# Patient Record
Sex: FEMALE | Race: BLACK OR AFRICAN AMERICAN | Employment: FULL TIME | URBAN - METROPOLITAN AREA
[De-identification: names, ages, dates, MRNs, and addresses within clinical notes are randomized per-mention and may not be internally consistent; named-entity substitution may affect disease eponyms.]

---

## 2017-03-03 ENCOUNTER — OFFICE VISIT (OUTPATIENT)
Dept: FAMILY MEDICINE CLINIC | Facility: CLINIC | Age: 32
End: 2017-03-03

## 2017-03-03 VITALS
HEIGHT: 68.5 IN | BODY MASS INDEX: 24.99 KG/M2 | HEART RATE: 86 BPM | WEIGHT: 166.81 LBS | SYSTOLIC BLOOD PRESSURE: 127 MMHG | DIASTOLIC BLOOD PRESSURE: 87 MMHG

## 2017-03-03 DIAGNOSIS — Z30.430 ENCOUNTER FOR INSERTION OF INTRAUTERINE CONTRACEPTIVE DEVICE: Primary | ICD-10-CM

## 2017-03-03 DIAGNOSIS — Z12.4 PAP SMEAR FOR CERVICAL CANCER SCREENING: ICD-10-CM

## 2017-03-03 PROCEDURE — 58300 INSERT INTRAUTERINE DEVICE: CPT | Performed by: FAMILY MEDICINE

## 2017-03-03 PROCEDURE — 99385 PREV VISIT NEW AGE 18-39: CPT | Performed by: FAMILY MEDICINE

## 2017-03-03 NOTE — PROGRESS NOTES
INTRAUTERINE DEVICE INSERTION   Richard Mohr is a 28year old female Here for _ IUD insertion   G_.   Regular partner x _ years   Wants long-term contraception, current contraception _   Last intercourse _              LMP _   _ - pap nl; GC/Chlamydia - Ne MD if f/c/foul sm elling vaginal discharge/ intolerable and worsening abdominal cramping/ vaginal bleeding/ lightheadeness or SOB. - String check in 4-12 weeks  Pt stated understanding; no learning barriers.     Grzegorz Springer MD

## 2017-03-05 LAB
C TRACH DNA SPEC QL NAA+PROBE: NEGATIVE
N GONORRHOEA DNA SPEC QL NAA+PROBE: NEGATIVE

## 2017-03-08 LAB — HPV I/H RISK 1 DNA SPEC QL NAA+PROBE: NEGATIVE

## 2017-03-09 ENCOUNTER — TELEPHONE (OUTPATIENT)
Dept: FAMILY MEDICINE CLINIC | Facility: CLINIC | Age: 32
End: 2017-03-09

## 2017-03-09 ENCOUNTER — OFFICE VISIT (OUTPATIENT)
Dept: FAMILY MEDICINE CLINIC | Facility: CLINIC | Age: 32
End: 2017-03-09

## 2017-03-09 VITALS — OXYGEN SATURATION: 98 % | HEART RATE: 94 BPM

## 2017-03-09 DIAGNOSIS — N30.01 ACUTE CYSTITIS WITH HEMATURIA: Primary | ICD-10-CM

## 2017-03-09 LAB
BILIRUBIN: NEGATIVE
GLUCOSE (URINE DIPSTICK): NEGATIVE MG/DL
KETONES (URINE DIPSTICK): NEGATIVE MG/DL
MULTISTIX LOT#: ABNORMAL NUMERIC
NITRITE, URINE: NEGATIVE
PH, URINE: 6 (ref 4.5–8)
PROTEIN (URINE DIPSTICK): 30 MG/DL
SPECIFIC GRAVITY: 1.02 (ref 1–1.03)
UROBILINOGEN,SEMI-QN: 0.2 MG/DL (ref 0–1.9)

## 2017-03-09 PROCEDURE — 87086 URINE CULTURE/COLONY COUNT: CPT | Performed by: NURSE PRACTITIONER

## 2017-03-09 PROCEDURE — 99212 OFFICE O/P EST SF 10 MIN: CPT | Performed by: NURSE PRACTITIONER

## 2017-03-09 PROCEDURE — 87186 SC STD MICRODIL/AGAR DIL: CPT | Performed by: NURSE PRACTITIONER

## 2017-03-09 PROCEDURE — 81002 URINALYSIS NONAUTO W/O SCOPE: CPT | Performed by: NURSE PRACTITIONER

## 2017-03-09 PROCEDURE — 87077 CULTURE AEROBIC IDENTIFY: CPT | Performed by: NURSE PRACTITIONER

## 2017-03-09 RX ORDER — NITROFURANTOIN 25; 75 MG/1; MG/1
100 CAPSULE ORAL 2 TIMES DAILY
Qty: 14 CAPSULE | Refills: 0 | Status: SHIPPED | OUTPATIENT
Start: 2017-03-09 | End: 2017-03-15

## 2017-03-09 NOTE — TELEPHONE ENCOUNTER
Dr. Tejas Luther can add her on at 245 if she can make it. Thanks.   I already asked Dr. Tejas Luther

## 2017-03-09 NOTE — TELEPHONE ENCOUNTER
Spoke to patient; she is not able to come in today; patient currently at work. Patient advised [facility] to evaluate. She verbalized understanding and will go to [facility]. Rn to f/u tomorrow.

## 2017-03-09 NOTE — TELEPHONE ENCOUNTER
Dr. Naylor Presume for Dr. Justa Naqvi, please see message below. Thank you. Reason for Call/Chief Complaint: Urgency, burning, urine retention. UTI Symptoms.   Birtha Kehr placed 3/3/17  Onset: Sunday  Nursing Assessment/Associated Symptoms: Pt states had Birtha Kehr place

## 2017-03-10 NOTE — TELEPHONE ENCOUNTER
Pt states she went to [facility] and was given prescription for UTI. Pt states she hasn't started med yet, will start today. Urine culture sent. Appt with Dr. Richie Leahy scheduled 3/11/17 canceled. Pt does have appt with Dr. Luke Easley 3/15/17.

## 2017-03-10 NOTE — PROGRESS NOTES
CHIEF COMPLAINT:   Patient presents with:  UTI      HPI:   Alyssa Ruiz is a 28year old female who presents with symptoms of UTI. IUD placed about 6 days ago, following day with urinary pressure, frequency, dysuria.  Did a at home urine test- positive f PLAN: Meds as listed below.   Comfort measures as described in Patient Instructions    Meds & Refills for this Visit:    Signed Prescriptions Disp Refills    Nitrofurantoin Monohyd Macro (MACROBID) 100 MG Oral Cap 14 capsule 0      Sig: Take 1 capsule (100 © [id number] [address], [address] [place]. All rights reserved. This information is not intended as a substitute for professional medical care. Always follow your healthcare professional's instructions.

## 2017-03-15 ENCOUNTER — OFFICE VISIT (OUTPATIENT)
Dept: FAMILY MEDICINE CLINIC | Facility: CLINIC | Age: 32
End: 2017-03-15

## 2017-03-15 ENCOUNTER — HOSPITAL ENCOUNTER (OUTPATIENT)
Dept: CT IMAGING | Facility: HOSPITAL | Age: 32
Discharge: HOME | End: 2017-03-15
Attending: FAMILY MEDICINE
Payer: MEDICAID

## 2017-03-15 ENCOUNTER — PRIOR ORIGINAL RECORDS (OUTPATIENT)
Dept: OTHER | Age: 32
End: 2017-03-15

## 2017-03-15 ENCOUNTER — LAB ENCOUNTER (OUTPATIENT)
Dept: LAB | Age: 32
End: 2017-03-15
Attending: FAMILY MEDICINE
Payer: MEDICAID

## 2017-03-15 VITALS
RESPIRATION RATE: 16 BRPM | SYSTOLIC BLOOD PRESSURE: 124 MMHG | WEIGHT: 167 LBS | HEART RATE: 84 BPM | TEMPERATURE: 98 F | BODY MASS INDEX: 25 KG/M2 | DIASTOLIC BLOOD PRESSURE: 76 MMHG

## 2017-03-15 DIAGNOSIS — R00.2 PALPITATIONS: Primary | ICD-10-CM

## 2017-03-15 DIAGNOSIS — R00.2 PALPITATIONS: ICD-10-CM

## 2017-03-15 DIAGNOSIS — R07.89 CHEST PRESSURE: ICD-10-CM

## 2017-03-15 LAB
ALBUMIN SERPL BCP-MCNC: 3.8 G/DL (ref 3.5–4.8)
ALBUMIN/GLOB SERPL: 1.2 {RATIO} (ref 1–2)
ALP SERPL-CCNC: 50 U/L (ref 32–100)
ALT SERPL-CCNC: 24 U/L (ref 14–54)
ANION GAP SERPL CALC-SCNC: 4 MMOL/L (ref 0–18)
AST SERPL-CCNC: 33 U/L (ref 15–41)
BASOPHILS # BLD: 0 K/UL (ref 0–0.2)
BASOPHILS NFR BLD: 0 %
BILIRUB SERPL-MCNC: 0.3 MG/DL (ref 0.3–1.2)
BUN SERPL-MCNC: 9 MG/DL (ref 8–20)
BUN/CREAT SERPL: 12.5 (ref 10–20)
CALCIUM SERPL-MCNC: 9 MG/DL (ref 8.5–10.5)
CHLORIDE SERPL-SCNC: 109 MMOL/L (ref 95–110)
CO2 SERPL-SCNC: 26 MMOL/L (ref 22–32)
CREAT BLD-MCNC: 0.7 MG/DL (ref 0.5–1.5)
CREAT SERPL-MCNC: 0.72 MG/DL (ref 0.5–1.5)
EOSINOPHIL # BLD: 0.1 K/UL (ref 0–0.7)
EOSINOPHIL NFR BLD: 2 %
ERYTHROCYTE [DISTWIDTH] IN BLOOD BY AUTOMATED COUNT: 20.5 % (ref 11–15)
GLOBULIN PLAS-MCNC: 3.3 G/DL (ref 2.5–3.7)
GLUCOSE SERPL-MCNC: 67 MG/DL (ref 70–99)
HCT VFR BLD AUTO: 27.3 % (ref 35–48)
HGB BLD-MCNC: 8.2 G/DL (ref 12–16)
LYMPHOCYTES # BLD: 2.7 K/UL (ref 1–4)
LYMPHOCYTES NFR BLD: 34 %
MCH RBC QN AUTO: 16.8 PG (ref 27–32)
MCHC RBC AUTO-ENTMCNC: 29.9 G/DL (ref 32–37)
MCV RBC AUTO: 56 FL (ref 80–100)
MONOCYTES # BLD: 0.6 K/UL (ref 0–1)
MONOCYTES NFR BLD: 8 %
NEUTROPHILS # BLD AUTO: 4.5 K/UL (ref 1.8–7.7)
NEUTROPHILS NFR BLD: 57 %
OSMOLALITY UR CALC.SUM OF ELEC: 285 MOSM/KG (ref 275–295)
PLATELET # BLD AUTO: 355 K/UL (ref 140–400)
PMV BLD AUTO: 8.9 FL (ref 7.4–10.3)
POTASSIUM SERPL-SCNC: 3.4 MMOL/L (ref 3.3–5.1)
PROT SERPL-MCNC: 7.1 G/DL (ref 5.9–8.4)
RBC # BLD AUTO: 4.88 M/UL (ref 3.7–5.4)
SODIUM SERPL-SCNC: 139 MMOL/L (ref 136–144)
TSH SERPL-ACNC: 0.47 UIU/ML (ref 0.34–5.6)
WBC # BLD AUTO: 7.9 K/UL (ref 4–11)

## 2017-03-15 PROCEDURE — 99214 OFFICE O/P EST MOD 30 MIN: CPT | Performed by: FAMILY MEDICINE

## 2017-03-15 PROCEDURE — 82565 ASSAY OF CREATININE: CPT

## 2017-03-15 PROCEDURE — 93000 ELECTROCARDIOGRAM COMPLETE: CPT | Performed by: FAMILY MEDICINE

## 2017-03-15 PROCEDURE — 80053 COMPREHEN METABOLIC PANEL: CPT

## 2017-03-15 PROCEDURE — 84443 ASSAY THYROID STIM HORMONE: CPT

## 2017-03-15 PROCEDURE — 85025 COMPLETE CBC W/AUTO DIFF WBC: CPT

## 2017-03-15 PROCEDURE — 36415 COLL VENOUS BLD VENIPUNCTURE: CPT

## 2017-03-15 PROCEDURE — 93005 ELECTROCARDIOGRAM TRACING: CPT | Performed by: FAMILY MEDICINE

## 2017-03-15 PROCEDURE — 99212 OFFICE O/P EST SF 10 MIN: CPT | Performed by: FAMILY MEDICINE

## 2017-03-15 PROCEDURE — 71260 CT THORAX DX C+: CPT

## 2017-03-20 ENCOUNTER — TELEPHONE (OUTPATIENT)
Dept: FAMILY MEDICINE CLINIC | Facility: CLINIC | Age: 32
End: 2017-03-20

## 2017-03-20 DIAGNOSIS — D64.9 LOW HEMOGLOBIN: Primary | ICD-10-CM

## 2017-03-20 NOTE — PROGRESS NOTES
HPI:    Isabel Burleson is a 28year old female presents to clinic with concerns of palpitations. Patient has been experiencing these symptoms for over a year, maybe closer to 2 years.  States that symptoms occur almost every day, usually while at rest, not PHYSICAL EXAM:      03/15/17  1335   BP: 124/76   Pulse: 84   Temp: 98.4 °F (36.9 °C)   TempSrc: Oral   Resp: 16   Weight: 167 lb (75.751 kg)      Physical Exam   Constitutional: She is well-developed, well-nourished, and in no distress.    HENT:   Head:

## 2017-03-27 ENCOUNTER — OFFICE VISIT (OUTPATIENT)
Dept: HEMATOLOGY/ONCOLOGY | Facility: HOSPITAL | Age: 32
End: 2017-03-27
Attending: INTERNAL MEDICINE
Payer: MEDICAID

## 2017-03-27 VITALS
TEMPERATURE: 98 F | DIASTOLIC BLOOD PRESSURE: 61 MMHG | RESPIRATION RATE: 16 BRPM | BODY MASS INDEX: 24.87 KG/M2 | SYSTOLIC BLOOD PRESSURE: 125 MMHG | WEIGHT: 166 LBS | HEART RATE: 85 BPM | HEIGHT: 68.5 IN

## 2017-03-27 DIAGNOSIS — D50.0 IRON DEFICIENCY ANEMIA DUE TO CHRONIC BLOOD LOSS: Primary | ICD-10-CM

## 2017-03-27 DIAGNOSIS — Z86.711 HISTORY OF PULMONARY EMBOLISM: ICD-10-CM

## 2017-03-27 DIAGNOSIS — N92.0 MENORRHAGIA WITH REGULAR CYCLE: ICD-10-CM

## 2017-03-27 PROCEDURE — 99245 OFF/OP CONSLTJ NEW/EST HI 55: CPT | Performed by: INTERNAL MEDICINE

## 2017-03-27 RX ORDER — ALBUTEROL SULFATE 90 UG/1
1 AEROSOL, METERED RESPIRATORY (INHALATION) EVERY 6 HOURS PRN
COMMUNITY
End: 2018-04-23

## 2017-03-27 NOTE — CONSULTS
[facility]    PATIENT'S NAME: Sedrick Mccoy   CONSULTING PHYSICIAN: Orlin Andrea.  Lore Doll MD   PATIENT ACCOUNT #: [de-identified] LOCATION: [address] [place] [address] RECORD #: [id number] YOB: 1985   CONSULTATION DATE: 03/27/2017       CANCER [facility] contraceptive in June 2016, menorrhagia, severe iron deficiency anemia. MEDICATION:  Ferrous sulfate. ALLERGIES:  No known drug allergies. SOCIAL HISTORY:  She is a never smoker. She does not drink alcohol.   She works as a .    FAMILY regard to her previous pulmonary embolism, we suspect this was provoked by an implantable hormonal contraceptive which has been removed. She is doing well off anticoagulation at this time.   We will screen for antiphospholipid syndrome, factor V prothrombi

## 2017-03-29 ENCOUNTER — TELEPHONE (OUTPATIENT)
Dept: FAMILY MEDICINE CLINIC | Facility: CLINIC | Age: 32
End: 2017-03-29

## 2017-03-29 NOTE — TELEPHONE ENCOUNTER
----- Message from Ashley Powell MD sent at 3/14/2017  9:31 AM CDT -----  Please inform patient of normal pap with neg HPV

## 2017-04-06 ENCOUNTER — OFFICE VISIT (OUTPATIENT)
Dept: HEMATOLOGY/ONCOLOGY | Facility: HOSPITAL | Age: 32
End: 2017-04-06
Attending: INTERNAL MEDICINE
Payer: MEDICAID

## 2017-04-06 ENCOUNTER — TELEPHONE (OUTPATIENT)
Dept: FAMILY MEDICINE CLINIC | Facility: CLINIC | Age: 32
End: 2017-04-06

## 2017-04-06 ENCOUNTER — PRIOR ORIGINAL RECORDS (OUTPATIENT)
Dept: OTHER | Age: 32
End: 2017-04-06

## 2017-04-06 ENCOUNTER — LAB ENCOUNTER (OUTPATIENT)
Dept: LAB | Facility: HOSPITAL | Age: 32
End: 2017-04-06
Attending: INTERNAL MEDICINE
Payer: MEDICAID

## 2017-04-06 VITALS
DIASTOLIC BLOOD PRESSURE: 63 MMHG | TEMPERATURE: 98 F | HEART RATE: 76 BPM | RESPIRATION RATE: 16 BRPM | SYSTOLIC BLOOD PRESSURE: 108 MMHG

## 2017-04-06 DIAGNOSIS — Z86.711 HISTORY OF PULMONARY EMBOLISM: ICD-10-CM

## 2017-04-06 DIAGNOSIS — D50.0 IRON DEFICIENCY ANEMIA DUE TO CHRONIC BLOOD LOSS: Primary | ICD-10-CM

## 2017-04-06 DIAGNOSIS — D50.0 IRON DEFICIENCY ANEMIA DUE TO CHRONIC BLOOD LOSS: ICD-10-CM

## 2017-04-06 DIAGNOSIS — R00.2 PALPITATIONS: Primary | ICD-10-CM

## 2017-04-06 DIAGNOSIS — N92.0 MENORRHAGIA WITH REGULAR CYCLE: ICD-10-CM

## 2017-04-06 LAB
BUN: 9 MG/DL
CREATININE, SERUM: 0.72 MG/DL
GLUCOSE: 67 MG/DL
POTASSIUM, SERUM: 3.4 MEQ/L
SGOT (AST): 33 IU/L
SGPT (ALT): 24 IU/L
SODIUM: 139 MEQ/L

## 2017-04-06 PROCEDURE — 86146 BETA-2 GLYCOPROTEIN ANTIBODY: CPT

## 2017-04-06 PROCEDURE — 96375 TX/PRO/DX INJ NEW DRUG ADDON: CPT

## 2017-04-06 PROCEDURE — 84466 ASSAY OF TRANSFERRIN: CPT

## 2017-04-06 PROCEDURE — 96365 THER/PROPH/DIAG IV INF INIT: CPT

## 2017-04-06 PROCEDURE — 96366 THER/PROPH/DIAG IV INF ADDON: CPT

## 2017-04-06 PROCEDURE — 81240 F2 GENE: CPT

## 2017-04-06 PROCEDURE — 83540 ASSAY OF IRON: CPT

## 2017-04-06 PROCEDURE — 86147 CARDIOLIPIN ANTIBODY EA IG: CPT

## 2017-04-06 PROCEDURE — 36415 COLL VENOUS BLD VENIPUNCTURE: CPT

## 2017-04-06 PROCEDURE — 85300 ANTITHROMBIN III ACTIVITY: CPT

## 2017-04-06 PROCEDURE — 85025 COMPLETE CBC W/AUTO DIFF WBC: CPT

## 2017-04-06 PROCEDURE — 81241 F5 GENE: CPT

## 2017-04-06 RX ORDER — DIPHENHYDRAMINE HCL 25 MG
CAPSULE ORAL
Status: COMPLETED
Start: 2017-04-06 | End: 2017-04-06

## 2017-04-06 RX ORDER — ACETAMINOPHEN 325 MG/1
TABLET ORAL
Status: COMPLETED
Start: 2017-04-06 | End: 2017-04-06

## 2017-04-06 RX ORDER — SODIUM CHLORIDE 9 MG/ML
INJECTION, SOLUTION INTRAVENOUS
Status: DISCONTINUED
Start: 2017-04-06 | End: 2017-04-06

## 2017-04-06 RX ORDER — 0.9 % SODIUM CHLORIDE 0.9 %
VIAL (ML) INJECTION
Status: DISCONTINUED
Start: 2017-04-06 | End: 2017-04-06

## 2017-04-06 RX ORDER — DIPHENHYDRAMINE HCL 25 MG
25 CAPSULE ORAL ONCE
Status: COMPLETED | OUTPATIENT
Start: 2017-04-06 | End: 2017-04-06

## 2017-04-06 RX ORDER — ACETAMINOPHEN 325 MG/1
650 TABLET ORAL ONCE
Status: COMPLETED | OUTPATIENT
Start: 2017-04-06 | End: 2017-04-06

## 2017-04-06 RX ADMIN — ACETAMINOPHEN 650 MG: 325 TABLET ORAL at 10:33:00

## 2017-04-06 RX ADMIN — DIPHENHYDRAMINE HCL 25 MG: 25 MG CAPSULE ORAL at 10:34:00

## 2017-04-06 NOTE — PROGRESS NOTES
Pt arrived to infusion for first dose of Iron Dextran. Labs from 3/25 HGB 8.2.  Pt recently diagnosed with PE, pt was having shortness of breath and palpitations, Doctors originally thought it was the PE, once that was resolved she was still experiencing th

## 2017-04-06 NOTE — TELEPHONE ENCOUNTER
Pt is currently at the cardiology office to see Dr. Janett Lala. They need this pt's referral and results of her 03/31 ekg faxed to them at [phone].  Thank you

## 2017-04-20 ENCOUNTER — TELEPHONE (OUTPATIENT)
Dept: FAMILY MEDICINE CLINIC | Facility: CLINIC | Age: 32
End: 2017-04-20

## 2017-04-21 ENCOUNTER — TELEPHONE (OUTPATIENT)
Dept: FAMILY MEDICINE CLINIC | Facility: CLINIC | Age: 32
End: 2017-04-21

## 2017-05-02 ENCOUNTER — APPOINTMENT (OUTPATIENT)
Dept: HEMATOLOGY/ONCOLOGY | Facility: HOSPITAL | Age: 32
End: 2017-05-02
Attending: INTERNAL MEDICINE
Payer: MEDICAID

## 2017-05-03 ENCOUNTER — OFFICE VISIT (OUTPATIENT)
Dept: FAMILY MEDICINE CLINIC | Facility: CLINIC | Age: 32
End: 2017-05-03

## 2017-05-03 VITALS
SYSTOLIC BLOOD PRESSURE: 122 MMHG | BODY MASS INDEX: 25 KG/M2 | DIASTOLIC BLOOD PRESSURE: 82 MMHG | RESPIRATION RATE: 16 BRPM | HEART RATE: 72 BPM | WEIGHT: 167 LBS | TEMPERATURE: 99 F

## 2017-05-03 DIAGNOSIS — Z30.432 ENCOUNTER FOR REMOVAL OF INTRAUTERINE CONTRACEPTIVE DEVICE: Primary | ICD-10-CM

## 2017-05-03 PROCEDURE — 58301 REMOVE INTRAUTERINE DEVICE: CPT | Performed by: FAMILY MEDICINE

## 2017-05-04 NOTE — PROGRESS NOTES
HPI: Gemini Coyne is a 28year old female who presents for IUD removal.  Placed by Dr. Julianna Cao in March 2017. Has been spotting since insertion. She has had cramps as well so she would like it removed. She cannot use other birth control.   Will just use condom

## 2017-05-10 NOTE — TELEPHONE ENCOUNTER
Tasked to Cardiology. Please place orders for these tests with the diagnosis and contact patient. Thank you.

## 2017-05-10 NOTE — TELEPHONE ENCOUNTER
Pt seen by MDB at [facility]. Not at [facility] cardiology. Please contact [phone].

## 2017-05-15 ENCOUNTER — HOSPITAL ENCOUNTER (OUTPATIENT)
Dept: CV DIAGNOSTICS | Facility: HOSPITAL | Age: 32
Discharge: HOME | End: 2017-05-15
Attending: INTERNAL MEDICINE
Payer: MEDICAID

## 2017-05-15 ENCOUNTER — OFFICE VISIT (OUTPATIENT)
Dept: HEMATOLOGY/ONCOLOGY | Facility: HOSPITAL | Age: 32
End: 2017-05-15
Attending: INTERNAL MEDICINE
Payer: MEDICAID

## 2017-05-15 VITALS
WEIGHT: 166 LBS | HEART RATE: 82 BPM | BODY MASS INDEX: 24.87 KG/M2 | DIASTOLIC BLOOD PRESSURE: 79 MMHG | RESPIRATION RATE: 16 BRPM | TEMPERATURE: 99 F | HEIGHT: 68.5 IN | SYSTOLIC BLOOD PRESSURE: 109 MMHG

## 2017-05-15 DIAGNOSIS — N92.0 MENORRHAGIA WITH REGULAR CYCLE: ICD-10-CM

## 2017-05-15 DIAGNOSIS — D50.0 IRON DEFICIENCY ANEMIA DUE TO CHRONIC BLOOD LOSS: Primary | ICD-10-CM

## 2017-05-15 DIAGNOSIS — Z86.711 HISTORY OF PULMONARY EMBOLISM: ICD-10-CM

## 2017-05-15 DIAGNOSIS — I49.3 VENTRICULAR PREMATURE BEATS: ICD-10-CM

## 2017-05-15 DIAGNOSIS — R07.2 CHEST PAIN, PRECORDIAL: ICD-10-CM

## 2017-05-15 DIAGNOSIS — R06.00 DYSPNEA: ICD-10-CM

## 2017-05-15 PROCEDURE — 93306 TTE W/DOPPLER COMPLETE: CPT | Performed by: INTERNAL MEDICINE

## 2017-05-15 PROCEDURE — 99214 OFFICE O/P EST MOD 30 MIN: CPT | Performed by: INTERNAL MEDICINE

## 2017-05-15 NOTE — PROGRESS NOTES
Cancer Center Progress Note    Patient Name: Ranjit Victor   YOB: 1985   Medical Record Number: [id number]   Attending Physician: Arron Hines M.D.        Chief Complaint:  Iron deficiency anemia, menorrhagia, history of provoked pulmonary embo of Onset   • Diabetes Father    • Heart Disorder Father    • Hypertension Father    • Diabetes Mother    • Heart Disorder Mother        Social History:    Social History   Marital Status: Single  Spouse Name: N/A    Years of Education: N/A  Number of Child Results  Component Value Date   GLU 67* 03/15/2017   BUN 9 03/15/2017   BUNCREA 12.5 03/15/2017   CREATSERUM 0.72 03/15/2017   ANIONGAP 4 03/15/2017   GFRNAA >60 03/15/2017   GFRAA >60 03/15/2017   CA 9.0 03/15/2017   OSMOCALC 285 03/15/2017   ALKPHO 50 03

## 2017-05-16 ENCOUNTER — APPOINTMENT (OUTPATIENT)
Dept: LAB | Facility: HOSPITAL | Age: 32
End: 2017-05-16
Attending: INTERNAL MEDICINE
Payer: MEDICAID

## 2017-05-16 ENCOUNTER — HOSPITAL ENCOUNTER (OUTPATIENT)
Dept: CV DIAGNOSTICS | Facility: HOSPITAL | Age: 32
Discharge: HOME | End: 2017-05-16
Attending: INTERNAL MEDICINE
Payer: MEDICAID

## 2017-05-16 DIAGNOSIS — I49.3 VENTRICULAR PREMATURE BEATS: ICD-10-CM

## 2017-05-16 DIAGNOSIS — R06.00 DYSPNEA: ICD-10-CM

## 2017-05-16 DIAGNOSIS — D50.0 IRON DEFICIENCY ANEMIA DUE TO CHRONIC BLOOD LOSS: ICD-10-CM

## 2017-05-16 DIAGNOSIS — R07.2 CHEST PAIN, PRECORDIAL: ICD-10-CM

## 2017-05-16 PROCEDURE — 85025 COMPLETE CBC W/AUTO DIFF WBC: CPT

## 2017-05-16 PROCEDURE — 84466 ASSAY OF TRANSFERRIN: CPT

## 2017-05-16 PROCEDURE — 36415 COLL VENOUS BLD VENIPUNCTURE: CPT

## 2017-05-16 PROCEDURE — 83540 ASSAY OF IRON: CPT

## 2017-05-16 PROCEDURE — 93227 XTRNL ECG REC<48 HR R&I: CPT | Performed by: INTERNAL MEDICINE

## 2017-05-17 ENCOUNTER — HOSPITAL ENCOUNTER (OUTPATIENT)
Dept: CV DIAGNOSTICS | Facility: HOSPITAL | Age: 32
Discharge: HOME | End: 2017-05-17
Attending: INTERNAL MEDICINE
Payer: MEDICAID

## 2017-06-03 NOTE — TELEPHONE ENCOUNTER
Patient contacted to followup on open encounter. All referrals have been placed and testing done. No further testing or orders needed at this time.

## 2017-07-17 ENCOUNTER — TELEPHONE (OUTPATIENT)
Dept: HEMATOLOGY/ONCOLOGY | Facility: HOSPITAL | Age: 32
End: 2017-07-17

## 2017-07-17 ENCOUNTER — OFFICE VISIT (OUTPATIENT)
Dept: HEMATOLOGY/ONCOLOGY | Facility: HOSPITAL | Age: 32
End: 2017-07-17
Attending: INTERNAL MEDICINE
Payer: MEDICAID

## 2017-07-17 ENCOUNTER — LAB ENCOUNTER (OUTPATIENT)
Dept: LAB | Facility: HOSPITAL | Age: 32
End: 2017-07-17
Attending: INTERNAL MEDICINE
Payer: MEDICAID

## 2017-07-17 VITALS
WEIGHT: 171 LBS | RESPIRATION RATE: 18 BRPM | TEMPERATURE: 99 F | SYSTOLIC BLOOD PRESSURE: 131 MMHG | HEART RATE: 91 BPM | HEIGHT: 68.5 IN | BODY MASS INDEX: 25.62 KG/M2 | DIASTOLIC BLOOD PRESSURE: 75 MMHG

## 2017-07-17 DIAGNOSIS — Z86.711 HISTORY OF PULMONARY EMBOLISM: ICD-10-CM

## 2017-07-17 DIAGNOSIS — D50.9 IRON DEFICIENCY ANEMIA: ICD-10-CM

## 2017-07-17 DIAGNOSIS — D50.0 IRON DEFICIENCY ANEMIA DUE TO CHRONIC BLOOD LOSS: Primary | ICD-10-CM

## 2017-07-17 DIAGNOSIS — N92.4 EXCESSIVE BLEEDING IN PREMENOPAUSAL PERIOD: ICD-10-CM

## 2017-07-17 LAB
BASOPHILS # BLD: 0 K/UL (ref 0–0.2)
BASOPHILS NFR BLD: 0 %
EOSINOPHIL # BLD: 0.2 K/UL (ref 0–0.7)
EOSINOPHIL NFR BLD: 3 %
ERYTHROCYTE [DISTWIDTH] IN BLOOD BY AUTOMATED COUNT: 19.3 % (ref 11–15)
HCT VFR BLD AUTO: 32.4 % (ref 35–48)
HGB BLD-MCNC: 10.1 G/DL (ref 12–16)
IRON SATN MFR SERPL: 20 % (ref 15–50)
IRON SERPL-MCNC: 76 MCG/DL (ref 28–170)
LYMPHOCYTES # BLD: 2.1 K/UL (ref 1–4)
LYMPHOCYTES NFR BLD: 27 %
MCH RBC QN AUTO: 20.3 PG (ref 27–32)
MCHC RBC AUTO-ENTMCNC: 31.3 G/DL (ref 32–37)
MCV RBC AUTO: 64.9 FL (ref 80–100)
MONOCYTES # BLD: 0.4 K/UL (ref 0–1)
MONOCYTES NFR BLD: 6 %
NEUTROPHILS # BLD AUTO: 4.9 K/UL (ref 1.8–7.7)
NEUTROPHILS NFR BLD: 64 %
PLATELET # BLD AUTO: 297 K/UL (ref 140–400)
PMV BLD AUTO: 9 FL (ref 7.4–10.3)
RBC # BLD AUTO: 4.99 M/UL (ref 3.7–5.4)
TIBC SERPL-MCNC: 375 MCG/DL (ref 228–428)
TRANSFERRIN SERPL-MCNC: 284 MG/DL (ref 192–382)
WBC # BLD AUTO: 7.6 K/UL (ref 4–11)

## 2017-07-17 PROCEDURE — 36415 COLL VENOUS BLD VENIPUNCTURE: CPT

## 2017-07-17 PROCEDURE — 99214 OFFICE O/P EST MOD 30 MIN: CPT | Performed by: INTERNAL MEDICINE

## 2017-07-17 PROCEDURE — 85025 COMPLETE CBC W/AUTO DIFF WBC: CPT

## 2017-07-17 PROCEDURE — 99212 OFFICE O/P EST SF 10 MIN: CPT | Performed by: INTERNAL MEDICINE

## 2017-07-17 PROCEDURE — 84466 ASSAY OF TRANSFERRIN: CPT

## 2017-07-17 PROCEDURE — 83540 ASSAY OF IRON: CPT

## 2017-07-17 RX ORDER — ACETAMINOPHEN 325 MG/1
650 TABLET ORAL ONCE
Status: CANCELLED | OUTPATIENT
Start: 2017-07-17 | End: 2017-07-17

## 2017-07-17 RX ORDER — DIPHENHYDRAMINE HCL 25 MG
25 CAPSULE ORAL ONCE
Status: CANCELLED | OUTPATIENT
Start: 2017-07-17 | End: 2017-07-17

## 2017-07-17 NOTE — TELEPHONE ENCOUNTER
Called patient and let her know that her iron saturation has improved some, but hemoglobin slightly decreased. Dr Shahid Benavides would like her to have repeat labs in one week.   I asked her to please call me the day after she has labs done and I would check with

## 2017-07-17 NOTE — PROGRESS NOTES
Cancer Center Progress Note    Patient Name: Kait Villalpando   YOB: 1985   Medical Record Number: [id number]   Attending Physician: Ximena Howard M.D.        Chief Complaint:  Iron deficiency anemia, menorrhagia, history of provoked pulmonary embo Mother    • Heart Disorder Mother        Social History:    Social History  Social History   Marital status: Single  Spouse name: N/A    Years of education: N/A  Number of children: N/A     Occupational History  None on file     Social History Main Topics 03/15/2017   BUN 9 03/15/2017   BUNCREA 12.5 03/15/2017   CREATSERUM 0.72 03/15/2017   ANIONGAP 4 03/15/2017   GFRNAA >60 03/15/2017   GFRAA >60 03/15/2017   CA 9.0 03/15/2017   OSMOCALC 285 03/15/2017   ALKPHO 50 03/15/2017   AST 33 03/15/2017   ALT 24 03

## 2017-08-03 ENCOUNTER — PRIOR ORIGINAL RECORDS (OUTPATIENT)
Dept: OTHER | Age: 32
End: 2017-08-03

## 2017-08-03 ENCOUNTER — LAB ENCOUNTER (OUTPATIENT)
Dept: LAB | Facility: HOSPITAL | Age: 32
End: 2017-08-03
Attending: INTERNAL MEDICINE
Payer: MEDICAID

## 2017-08-03 DIAGNOSIS — D50.9 IRON DEFICIENCY ANEMIA: ICD-10-CM

## 2017-08-03 LAB
BASOPHILS # BLD: 0 K/UL (ref 0–0.2)
BASOPHILS NFR BLD: 0 %
EOSINOPHIL # BLD: 0.1 K/UL (ref 0–0.7)
EOSINOPHIL NFR BLD: 2 %
ERYTHROCYTE [DISTWIDTH] IN BLOOD BY AUTOMATED COUNT: 17.1 % (ref 11–15)
FERRITIN SERPL IA-MCNC: 14 NG/ML (ref 11–307)
HCT VFR BLD AUTO: 33.3 % (ref 35–48)
HGB BLD-MCNC: 10.3 G/DL (ref 12–16)
IRON SATN MFR SERPL: 6 % (ref 15–50)
IRON SERPL-MCNC: 24 MCG/DL (ref 28–170)
LYMPHOCYTES # BLD: 2.2 K/UL (ref 1–4)
LYMPHOCYTES NFR BLD: 32 %
MCH RBC QN AUTO: 20.2 PG (ref 27–32)
MCHC RBC AUTO-ENTMCNC: 30.9 G/DL (ref 32–37)
MCV RBC AUTO: 65.4 FL (ref 80–100)
MONOCYTES # BLD: 0.4 K/UL (ref 0–1)
MONOCYTES NFR BLD: 7 %
NEUTROPHILS # BLD AUTO: 4.1 K/UL (ref 1.8–7.7)
NEUTROPHILS NFR BLD: 59 %
PLATELET # BLD AUTO: 275 K/UL (ref 140–400)
PMV BLD AUTO: 9.3 FL (ref 7.4–10.3)
RBC # BLD AUTO: 5.09 M/UL (ref 3.7–5.4)
TIBC SERPL-MCNC: 385 MCG/DL (ref 228–428)
TRANSFERRIN SERPL-MCNC: 292 MG/DL (ref 192–382)
WBC # BLD AUTO: 6.9 K/UL (ref 4–11)

## 2017-08-03 PROCEDURE — 83540 ASSAY OF IRON: CPT

## 2017-08-03 PROCEDURE — 36415 COLL VENOUS BLD VENIPUNCTURE: CPT

## 2017-08-03 PROCEDURE — 84466 ASSAY OF TRANSFERRIN: CPT

## 2017-08-03 PROCEDURE — 82728 ASSAY OF FERRITIN: CPT

## 2017-08-03 PROCEDURE — 85025 COMPLETE CBC W/AUTO DIFF WBC: CPT

## 2017-08-11 ENCOUNTER — OFFICE VISIT (OUTPATIENT)
Dept: HEMATOLOGY/ONCOLOGY | Facility: HOSPITAL | Age: 32
End: 2017-08-11
Attending: INTERNAL MEDICINE
Payer: MEDICAID

## 2017-08-11 VITALS
SYSTOLIC BLOOD PRESSURE: 122 MMHG | DIASTOLIC BLOOD PRESSURE: 70 MMHG | RESPIRATION RATE: 16 BRPM | HEART RATE: 78 BPM | TEMPERATURE: 98 F

## 2017-08-11 DIAGNOSIS — D50.0 IRON DEFICIENCY ANEMIA DUE TO CHRONIC BLOOD LOSS: Primary | ICD-10-CM

## 2017-08-11 PROCEDURE — 96375 TX/PRO/DX INJ NEW DRUG ADDON: CPT

## 2017-08-11 PROCEDURE — 96365 THER/PROPH/DIAG IV INF INIT: CPT

## 2017-08-11 RX ORDER — ACETAMINOPHEN 325 MG/1
TABLET ORAL
Status: DISCONTINUED
Start: 2017-08-11 | End: 2017-08-11

## 2017-08-11 RX ORDER — DIPHENHYDRAMINE HCL 25 MG
CAPSULE ORAL
Status: DISCONTINUED
Start: 2017-08-11 | End: 2017-08-11

## 2017-08-11 RX ORDER — ACETAMINOPHEN 325 MG/1
650 TABLET ORAL ONCE
Status: CANCELLED | OUTPATIENT
Start: 2017-08-11 | End: 2017-08-11

## 2017-08-11 RX ORDER — DIPHENHYDRAMINE HCL 25 MG
25 CAPSULE ORAL ONCE
Status: COMPLETED | OUTPATIENT
Start: 2017-08-11 | End: 2017-08-11

## 2017-08-11 RX ORDER — ACETAMINOPHEN 325 MG/1
650 TABLET ORAL ONCE
Status: COMPLETED | OUTPATIENT
Start: 2017-08-11 | End: 2017-08-11

## 2017-08-11 RX ORDER — DIPHENHYDRAMINE HCL 25 MG
25 CAPSULE ORAL ONCE
Status: CANCELLED | OUTPATIENT
Start: 2017-08-11 | End: 2017-08-11

## 2017-08-11 RX ORDER — SODIUM CHLORIDE 9 MG/ML
INJECTION, SOLUTION INTRAVENOUS
Status: DISCONTINUED
Start: 2017-08-11 | End: 2017-08-11

## 2017-08-11 RX ADMIN — ACETAMINOPHEN 650 MG: 325 TABLET ORAL at 09:40:00

## 2017-08-11 RX ADMIN — DIPHENHYDRAMINE HCL 25 MG: 25 MG CAPSULE ORAL at 09:40:00

## 2017-08-11 NOTE — PROGRESS NOTES
Pt to infusion area for Iron Dextran. She has had Iron Dextran in the past and tolerated well. Labs on 8/3/17 showed HGB 10.3, Iron 24 and Ferritin 14. C/O feeling fatigued and slightly short of breath with exertion.  She states she did have relief of symp

## 2017-08-14 ENCOUNTER — OFFICE VISIT (OUTPATIENT)
Dept: FAMILY MEDICINE CLINIC | Facility: CLINIC | Age: 32
End: 2017-08-14

## 2017-08-14 VITALS
TEMPERATURE: 98 F | RESPIRATION RATE: 12 BRPM | DIASTOLIC BLOOD PRESSURE: 73 MMHG | WEIGHT: 171 LBS | HEART RATE: 73 BPM | SYSTOLIC BLOOD PRESSURE: 108 MMHG | BODY MASS INDEX: 26 KG/M2

## 2017-08-14 DIAGNOSIS — Z01.84 IMMUNITY STATUS TESTING: ICD-10-CM

## 2017-08-14 DIAGNOSIS — N92.0 MENORRHAGIA WITH REGULAR CYCLE: Primary | ICD-10-CM

## 2017-08-14 PROCEDURE — 99214 OFFICE O/P EST MOD 30 MIN: CPT | Performed by: FAMILY MEDICINE

## 2017-08-14 PROCEDURE — 99212 OFFICE O/P EST SF 10 MIN: CPT | Performed by: FAMILY MEDICINE

## 2017-08-14 RX ORDER — CLINDAMYCIN AND BENZOYL PEROXIDE 10; 50 MG/G; MG/G
1 GEL TOPICAL NIGHTLY
Qty: 45 G | Refills: 3 | Status: SHIPPED | OUTPATIENT
Start: 2017-08-14 | End: 2017-10-07

## 2017-08-19 ENCOUNTER — APPOINTMENT (OUTPATIENT)
Dept: LAB | Age: 32
End: 2017-08-19
Attending: FAMILY MEDICINE
Payer: MEDICAID

## 2017-08-19 DIAGNOSIS — Z01.84 IMMUNITY STATUS TESTING: ICD-10-CM

## 2017-08-19 LAB — RUBV IGG SER-ACNC: 17.3 IU/ML

## 2017-08-19 PROCEDURE — 86765 RUBEOLA ANTIBODY: CPT

## 2017-08-19 PROCEDURE — 86735 MUMPS ANTIBODY: CPT

## 2017-08-19 PROCEDURE — 86706 HEP B SURFACE ANTIBODY: CPT

## 2017-08-19 PROCEDURE — 86787 VARICELLA-ZOSTER ANTIBODY: CPT

## 2017-08-19 PROCEDURE — 86762 RUBELLA ANTIBODY: CPT

## 2017-08-19 PROCEDURE — 36415 COLL VENOUS BLD VENIPUNCTURE: CPT

## 2017-08-21 LAB
HBV SURFACE AB SER-ACNC: 13.38 MIU/ML (ref ?–10)
HBV SURFACE AG SERPL QL IA: REACTIVE
MEV IGG SER-ACNC: 119 AU/ML (ref 30–?)
MUV IGG SER IA-ACNC: 43.2 AU/ML (ref 11–?)
VZV IGG SER IA-ACNC: 1747 (ref 165–?)

## 2017-08-22 ENCOUNTER — PATIENT MESSAGE (OUTPATIENT)
Dept: FAMILY MEDICINE CLINIC | Facility: CLINIC | Age: 32
End: 2017-08-22

## 2017-08-23 ENCOUNTER — TELEPHONE (OUTPATIENT)
Dept: FAMILY MEDICINE CLINIC | Facility: CLINIC | Age: 32
End: 2017-08-23

## 2017-08-23 RX ORDER — CLINDAMYCIN PHOSPHATE 10 MG/G
1 GEL TOPICAL 2 TIMES DAILY
Qty: 30 G | Refills: 1 | Status: SHIPPED | OUTPATIENT
Start: 2017-08-23 | End: 2017-10-07

## 2017-08-23 NOTE — TELEPHONE ENCOUNTER
From: Jb Encinas  To: Isma Toribio MD  Sent: 8/22/2017 3:24 PM CDT  Subject: Prescription Question    Dr. Roselia Denton,    The prescribed medication is not covered by my insurance, can you please prescribe a medication that is covered?  If not, I will try

## 2017-08-25 RX ORDER — BENZOYL PEROXIDE 10 G/100G
1 GEL TOPICAL NIGHTLY
Qty: 28 G | Refills: 0 | Status: SHIPPED | OUTPATIENT
Start: 2017-08-25 | End: 2017-12-27 | Stop reason: ALTCHOICE

## 2017-08-30 NOTE — TELEPHONE ENCOUNTER
Spoke with [facility] and patient picked up the Benzyol peroxide; need PA for Clindamycin gel. PA for Clindamycin phosphate 1% gel completed with [employer] via CMM response time 3-5 business days KEY DAI.

## 2017-09-08 ENCOUNTER — APPOINTMENT (OUTPATIENT)
Dept: HEMATOLOGY/ONCOLOGY | Facility: HOSPITAL | Age: 32
End: 2017-09-08
Attending: INTERNAL MEDICINE
Payer: MEDICAID

## 2017-09-09 ENCOUNTER — OFFICE VISIT (OUTPATIENT)
Dept: FAMILY MEDICINE CLINIC | Facility: CLINIC | Age: 32
End: 2017-09-09

## 2017-09-09 VITALS
TEMPERATURE: 98 F | RESPIRATION RATE: 16 BRPM | SYSTOLIC BLOOD PRESSURE: 108 MMHG | DIASTOLIC BLOOD PRESSURE: 70 MMHG | OXYGEN SATURATION: 98 % | HEART RATE: 76 BPM

## 2017-09-09 DIAGNOSIS — R30.0 DYSURIA: Primary | ICD-10-CM

## 2017-09-09 LAB
BILIRUBIN: NEGATIVE
GLUCOSE (URINE DIPSTICK): NEGATIVE MG/DL
KETONES (URINE DIPSTICK): NEGATIVE MG/DL
MULTISTIX LOT#: ABNORMAL NUMERIC
NITRITE, URINE: NEGATIVE
PH, URINE: 5.5 (ref 4.5–8)
SPECIFIC GRAVITY: 1.02 (ref 1–1.03)
URINE-COLOR: YELLOW
UROBILINOGEN,SEMI-QN: 0.2 MG/DL (ref 0–1.9)

## 2017-09-09 PROCEDURE — 87088 URINE BACTERIA CULTURE: CPT

## 2017-09-09 PROCEDURE — 99213 OFFICE O/P EST LOW 20 MIN: CPT

## 2017-09-09 PROCEDURE — 87186 SC STD MICRODIL/AGAR DIL: CPT

## 2017-09-09 PROCEDURE — 81003 URINALYSIS AUTO W/O SCOPE: CPT

## 2017-09-09 PROCEDURE — 87086 URINE CULTURE/COLONY COUNT: CPT

## 2017-09-09 RX ORDER — NITROFURANTOIN 25; 75 MG/1; MG/1
CAPSULE ORAL
Qty: 21 CAPSULE | Refills: 0 | Status: SHIPPED | OUTPATIENT
Start: 2017-09-09 | End: 2017-10-07

## 2017-09-09 NOTE — PROGRESS NOTES
Anali Tolbert is a 33year old female. CHIEF COMPLAINT:   Patient presents with:  UTI: Urinary pressure and frequency for a week      HPI:   Patient presents with symptoms of UTI. Reports 7-8 day history of urinary frequency and dysuria.   Denies flank miko HPI.  NEURO: no headaches.     EXAM:   /70   Pulse 76   Temp 98 °F (36.7 °C) (Oral)   Resp 16   LMP 08/08/2017   SpO2 98%   GENERAL: well developed, well nourished, in no apparent distress  CARDIO: RRR, no murmurs  LUNGS: clear to ausculation bilatera use.  9)  Void/urinate after sex. Avoid rear entry approach. 10) Call your clinic or health care provider if symptoms are not resolved by the end of the antibiotic. The patient indicates understanding of these issues and agrees to the plan.

## 2017-09-16 ENCOUNTER — NURSE ONLY (OUTPATIENT)
Dept: FAMILY MEDICINE CLINIC | Facility: CLINIC | Age: 32
End: 2017-09-16

## 2017-09-16 DIAGNOSIS — Z23 NEED FOR TDAP VACCINATION: Primary | ICD-10-CM

## 2017-09-16 PROCEDURE — 90471 IMMUNIZATION ADMIN: CPT | Performed by: FAMILY MEDICINE

## 2017-09-16 PROCEDURE — 90715 TDAP VACCINE 7 YRS/> IM: CPT | Performed by: FAMILY MEDICINE

## 2017-09-16 NOTE — PROGRESS NOTES
Pt is here for Tdap vaccine per Dr Anjali Meneses orders. Tolerated well, and copy of imm records given.

## 2017-10-07 ENCOUNTER — OFFICE VISIT (OUTPATIENT)
Dept: OBGYN CLINIC | Facility: CLINIC | Age: 32
End: 2017-10-07

## 2017-10-07 VITALS
BODY MASS INDEX: 26 KG/M2 | WEIGHT: 170.81 LBS | HEART RATE: 79 BPM | SYSTOLIC BLOOD PRESSURE: 114 MMHG | DIASTOLIC BLOOD PRESSURE: 77 MMHG

## 2017-10-07 DIAGNOSIS — N92.0 MENORRHAGIA WITH REGULAR CYCLE: Primary | ICD-10-CM

## 2017-10-07 PROCEDURE — 99203 OFFICE O/P NEW LOW 30 MIN: CPT | Performed by: OBSTETRICS & GYNECOLOGY

## 2017-10-07 NOTE — PROGRESS NOTES
HPI:    Patient ID: Cristobal Schmitt is a 33year old female. HPI  Patient referred by PCP from [place] [id number]. Reports heavy menses with regular cycles. Patient has needed Iron Transfusions due to severe anemia. Oral Iron was not helping.   H/O Pulmonary Embo encounter.       Meds This Visit:  No prescriptions requested or ordered in this encounter    Imaging & Referrals:  US PELVIS EV (TRNS ABD AND ENDOVAG) ([id number])       #[phone]

## 2017-10-12 ENCOUNTER — TELEPHONE (OUTPATIENT)
Dept: OBGYN CLINIC | Facility: CLINIC | Age: 32
End: 2017-10-12

## 2017-10-12 ENCOUNTER — HOSPITAL ENCOUNTER (OUTPATIENT)
Dept: ULTRASOUND IMAGING | Age: 32
Discharge: HOME | End: 2017-10-12
Attending: OBSTETRICS & GYNECOLOGY
Payer: MEDICAID

## 2017-10-12 DIAGNOSIS — N92.0 MENORRHAGIA WITH REGULAR CYCLE: ICD-10-CM

## 2017-10-12 PROCEDURE — 76830 TRANSVAGINAL US NON-OB: CPT | Performed by: OBSTETRICS & GYNECOLOGY

## 2017-10-12 PROCEDURE — 76856 US EXAM PELVIC COMPLETE: CPT | Performed by: OBSTETRICS & GYNECOLOGY

## 2017-10-12 NOTE — TELEPHONE ENCOUNTER
----- Message from Aaron Christian MD sent at 10/12/2017  1:05 PM CDT -----  Normal uterus on pelvic U/S. Patient should keep her appointment with Dr Amie Han to discuss Endometrial Ablation or Robotic Hysterectomy.

## 2017-10-12 NOTE — TELEPHONE ENCOUNTER
The pt is returning a nurse's call, and states that a detailed v/m can be left at [phone]. Please advise.

## 2017-10-18 ENCOUNTER — MED REC SCAN ONLY (OUTPATIENT)
Dept: OBGYN CLINIC | Facility: CLINIC | Age: 32
End: 2017-10-18

## 2017-10-19 ENCOUNTER — OFFICE VISIT (OUTPATIENT)
Dept: OBGYN CLINIC | Facility: CLINIC | Age: 32
End: 2017-10-19

## 2017-10-19 VITALS
WEIGHT: 170 LBS | BODY MASS INDEX: 25 KG/M2 | DIASTOLIC BLOOD PRESSURE: 86 MMHG | HEART RATE: 88 BPM | SYSTOLIC BLOOD PRESSURE: 126 MMHG

## 2017-10-19 DIAGNOSIS — D50.0 IRON DEFICIENCY ANEMIA DUE TO CHRONIC BLOOD LOSS: ICD-10-CM

## 2017-10-19 DIAGNOSIS — N92.0 MENORRHAGIA WITH REGULAR CYCLE: Primary | ICD-10-CM

## 2017-10-19 PROCEDURE — 99215 OFFICE O/P EST HI 40 MIN: CPT | Performed by: OBSTETRICS & GYNECOLOGY

## 2017-10-20 ENCOUNTER — TELEPHONE (OUTPATIENT)
Dept: FAMILY MEDICINE CLINIC | Facility: CLINIC | Age: 32
End: 2017-10-20

## 2017-10-20 NOTE — PROGRESS NOTES
[address] [facility]  Obstetrics and Gynecology  Focused Gynecology Problem Exam  Naya Man MD    Trinidad Nath is a 33year old female presenting for Consult (With heavy periods and severe anemia)  . HPI:   Patient presents with:  Consult:  With heavy Diagnosis Date   • Anemia    • Pulmonary embolism (HCC)        Past Surgical History:  No date: ANESTH, SECTION  No date:   No date: OTHER      Comment: bowel obstruction  : OTHER SURGICAL HISTORY      Comment: Bowel obstruction-at b on iron infusions for her menstrual issues. Patient with history of prior abdominal and pelvic surgery as well as prior  section.   Patient with a history of previous pulmonary embolism which occurred spontaneously.    (N92.0) Menorrhagia with regu lighter/less frequent menses. Patient was counseled extensively on her previous surgeries including a removal of pelvic mass followed by a bowel obstruction surgery as well as a primary .   Patient counseled on increased risk of bowel injury or boni

## 2017-10-23 NOTE — TELEPHONE ENCOUNTER
No nurse visits in [facility] after 5pm on the schedule.  First available Saturday is on November 11th - if pt needs to come in sooner can we double book her in for a Saturday (10/28 or 11/4?)

## 2017-10-28 ENCOUNTER — NURSE ONLY (OUTPATIENT)
Dept: FAMILY MEDICINE CLINIC | Facility: CLINIC | Age: 32
End: 2017-10-28

## 2017-10-28 DIAGNOSIS — Z11.1 VISIT FOR TB SKIN TEST: Primary | ICD-10-CM

## 2017-10-28 PROCEDURE — 86580 TB INTRADERMAL TEST: CPT | Performed by: FAMILY MEDICINE

## 2017-10-28 NOTE — PROGRESS NOTES
Pt presented for TB skin test. Given Left forearm, advised to return 48-72 hours for read. Pt was concerned that school told her she needs 3 TDAP vaccines. Advised pt that TDAP is given every 10 years and is not a 3 dose series.  Pt states that she was told

## 2017-10-30 ENCOUNTER — TELEPHONE (OUTPATIENT)
Dept: FAMILY MEDICINE CLINIC | Facility: CLINIC | Age: 32
End: 2017-10-30

## 2017-10-30 NOTE — TELEPHONE ENCOUNTER
Pt stts she was informed by her school that she needs 2 doses of Td. Pt is not exactly sure what school means. Pt had TDAP on 10/28. Pt asking to speak with a nurse.

## 2017-11-02 NOTE — TELEPHONE ENCOUNTER
Dr Gary Wills [facility] 1 tdap and 2 boosters , the first was give 9/16/17 please order additional immunizations and ask staff to call patient to schedule nurse visit

## 2017-11-02 NOTE — TELEPHONE ENCOUNTER
Form received, and requires dates of 3 doses of Tdap (TD or Dtap), including one dose in the past 10 years.  Explained to pt that since she had her Tdap last month, she doesn't need another one, and to tell her school that she doesn't have her previous imm

## 2017-11-02 NOTE — TELEPHONE ENCOUNTER
The Tdap doesn't require a booster, you just need one every 10 years. I dont believe we can just give her this vaccine without another underlying medical indication.

## 2017-11-04 ENCOUNTER — OFFICE VISIT (OUTPATIENT)
Dept: OBGYN CLINIC | Facility: CLINIC | Age: 32
End: 2017-11-04

## 2017-11-04 VITALS — DIASTOLIC BLOOD PRESSURE: 70 MMHG | SYSTOLIC BLOOD PRESSURE: 110 MMHG

## 2017-11-04 DIAGNOSIS — D50.0 IRON DEFICIENCY ANEMIA DUE TO CHRONIC BLOOD LOSS: ICD-10-CM

## 2017-11-04 DIAGNOSIS — N92.0 MENORRHAGIA WITH REGULAR CYCLE: Primary | ICD-10-CM

## 2017-11-04 PROCEDURE — 99214 OFFICE O/P EST MOD 30 MIN: CPT | Performed by: OBSTETRICS & GYNECOLOGY

## 2017-11-05 NOTE — PROGRESS NOTES
[address] [facility]  Obstetrics and Gynecology  Focused Gynecology Problem Exam  Darek Oro MD    Joshua Silva is a 33year old female presenting for Follow - Up (Follow up per patient would like to have surgery started menses yesterday on day two)  . History:   Diagnosis Date   • Anemia    • Pulmonary embolism (HCC)        Past Surgical History:  No date: ANESTH, SECTION  No date:   No date: OTHER      Comment: bowel obstruction  : OTHER SURGICAL HISTORY      Comment: Bowel obstruc hysterectomy versus abdominal hysterectomy generally has less recovery, less risk and less complications.   In her case with her previous abdominal surgeries and bowel surgeries it may be difficult to complete procedure laparoscopically if she is significan

## 2017-11-07 ENCOUNTER — TELEPHONE (OUTPATIENT)
Dept: OBGYN CLINIC | Facility: CLINIC | Age: 32
End: 2017-11-07

## 2017-11-07 NOTE — TELEPHONE ENCOUNTER
Records received from [facility] at [facility]. Placed on JF desk in [facility] for review.

## 2017-11-08 NOTE — TELEPHONE ENCOUNTER
Contacted patient. She states both parents  and she doesn't have records. I advised her to check with [facility] or [employer] [facility] where she attended school.  She said that the school does have a form that Dr. Yessica Palacios can sign stating another dose is not ne

## 2017-11-17 ENCOUNTER — APPOINTMENT (OUTPATIENT)
Dept: HEMATOLOGY/ONCOLOGY | Facility: HOSPITAL | Age: 32
End: 2017-11-17
Attending: INTERNAL MEDICINE
Payer: MEDICAID

## 2017-11-18 ENCOUNTER — LAB ENCOUNTER (OUTPATIENT)
Dept: LAB | Facility: HOSPITAL | Age: 32
End: 2017-11-18
Attending: INTERNAL MEDICINE
Payer: MEDICAID

## 2017-11-18 DIAGNOSIS — D50.9 IRON DEFICIENCY ANEMIA: ICD-10-CM

## 2017-11-18 PROCEDURE — 36415 COLL VENOUS BLD VENIPUNCTURE: CPT

## 2017-11-18 PROCEDURE — 84466 ASSAY OF TRANSFERRIN: CPT

## 2017-11-18 PROCEDURE — 85025 COMPLETE CBC W/AUTO DIFF WBC: CPT

## 2017-11-18 PROCEDURE — 83540 ASSAY OF IRON: CPT

## 2017-11-20 ENCOUNTER — OFFICE VISIT (OUTPATIENT)
Dept: HEMATOLOGY/ONCOLOGY | Facility: HOSPITAL | Age: 32
End: 2017-11-20
Attending: INTERNAL MEDICINE
Payer: MEDICAID

## 2017-11-20 VITALS
BODY MASS INDEX: 25.47 KG/M2 | TEMPERATURE: 99 F | SYSTOLIC BLOOD PRESSURE: 118 MMHG | RESPIRATION RATE: 16 BRPM | HEIGHT: 68.5 IN | DIASTOLIC BLOOD PRESSURE: 73 MMHG | HEART RATE: 74 BPM | WEIGHT: 170 LBS

## 2017-11-20 DIAGNOSIS — D50.0 IRON DEFICIENCY ANEMIA DUE TO CHRONIC BLOOD LOSS: Primary | ICD-10-CM

## 2017-11-20 DIAGNOSIS — N92.4 EXCESSIVE BLEEDING IN PREMENOPAUSAL PERIOD: ICD-10-CM

## 2017-11-20 DIAGNOSIS — Z86.711 HISTORY OF PULMONARY EMBOLISM: ICD-10-CM

## 2017-11-20 PROCEDURE — 99214 OFFICE O/P EST MOD 30 MIN: CPT | Performed by: INTERNAL MEDICINE

## 2017-11-20 PROCEDURE — 99212 OFFICE O/P EST SF 10 MIN: CPT | Performed by: INTERNAL MEDICINE

## 2017-11-20 NOTE — PROGRESS NOTES
Cancer Center Progress Note    Patient Name: Kasey Noyola   YOB: 1985   Medical Record Number: [id number]   Attending Physician: Teri Winters M.D.        Chief Complaint:  Iron deficiency anemia, menorrhagia, history of provoked pulmonary embo Comment: Colon resection for bowel obstruction-one                month after colon mass removed.     Family History:  Family History   Problem Relation Age of Onset   • Diabetes Father    • Heart Disorder Father    • Hypertension Father    • Diabetes Mothe WBC  4.4   HGB  10.1*   PLT  302   NE  2.3             Lab Results  Component Value Date   GLU 67 (L) 03/15/2017   BUN 9 03/15/2017   BUNCREA 12.5 03/15/2017   CREATSERUM 0.72 03/15/2017   ANIONGAP 4 03/15/2017   GFRNAA >60 03/15/2017   GFRAA >60 03/15/2

## 2017-11-30 ENCOUNTER — TELEPHONE (OUTPATIENT)
Dept: PEDIATRICS CLINIC | Facility: CLINIC | Age: 32
End: 2017-11-30

## 2017-12-01 NOTE — TELEPHONE ENCOUNTER
Prior to scheduling her hysterectomy I am hoping to get her previous records which we have not been successful at. We did receive some of her previous records but they were pertaining to her pregnancy and not to her previous bowel surgeries.   Please obtai

## 2017-12-04 ENCOUNTER — HOSPITAL ENCOUNTER (OUTPATIENT)
Age: 32
Discharge: HOME | End: 2017-12-04
Attending: FAMILY MEDICINE
Payer: MEDICAID

## 2017-12-04 ENCOUNTER — OFFICE VISIT (OUTPATIENT)
Dept: HEMATOLOGY/ONCOLOGY | Facility: HOSPITAL | Age: 32
End: 2017-12-04
Attending: INTERNAL MEDICINE
Payer: MEDICAID

## 2017-12-04 ENCOUNTER — APPOINTMENT (OUTPATIENT)
Dept: GENERAL RADIOLOGY | Age: 32
End: 2017-12-04
Attending: FAMILY MEDICINE
Payer: MEDICAID

## 2017-12-04 VITALS
SYSTOLIC BLOOD PRESSURE: 126 MMHG | DIASTOLIC BLOOD PRESSURE: 78 MMHG | HEART RATE: 77 BPM | OXYGEN SATURATION: 100 % | TEMPERATURE: 98 F | RESPIRATION RATE: 20 BRPM

## 2017-12-04 VITALS
HEART RATE: 70 BPM | TEMPERATURE: 98 F | SYSTOLIC BLOOD PRESSURE: 121 MMHG | RESPIRATION RATE: 16 BRPM | DIASTOLIC BLOOD PRESSURE: 71 MMHG

## 2017-12-04 DIAGNOSIS — M25.562 ACUTE PAIN OF LEFT KNEE: Primary | ICD-10-CM

## 2017-12-04 DIAGNOSIS — D50.0 IRON DEFICIENCY ANEMIA DUE TO CHRONIC BLOOD LOSS: Primary | ICD-10-CM

## 2017-12-04 PROCEDURE — 73560 X-RAY EXAM OF KNEE 1 OR 2: CPT | Performed by: FAMILY MEDICINE

## 2017-12-04 PROCEDURE — 99214 OFFICE O/P EST MOD 30 MIN: CPT

## 2017-12-04 PROCEDURE — 96365 THER/PROPH/DIAG IV INF INIT: CPT

## 2017-12-04 PROCEDURE — 96375 TX/PRO/DX INJ NEW DRUG ADDON: CPT

## 2017-12-04 PROCEDURE — 99213 OFFICE O/P EST LOW 20 MIN: CPT

## 2017-12-04 RX ORDER — ACETAMINOPHEN 325 MG/1
650 TABLET ORAL EVERY 6 HOURS PRN
Status: DISCONTINUED | OUTPATIENT
Start: 2017-12-04 | End: 2017-12-04

## 2017-12-04 RX ORDER — DIPHENHYDRAMINE HYDROCHLORIDE 50 MG/ML
25 INJECTION INTRAMUSCULAR; INTRAVENOUS ONCE
Status: CANCELLED
Start: 2017-12-04 | End: 2017-12-04

## 2017-12-04 RX ORDER — ACETAMINOPHEN 325 MG/1
650 TABLET ORAL EVERY 6 HOURS PRN
Status: CANCELLED
Start: 2017-12-04

## 2017-12-04 RX ORDER — DIPHENHYDRAMINE HYDROCHLORIDE 50 MG/ML
INJECTION INTRAMUSCULAR; INTRAVENOUS
Status: COMPLETED
Start: 2017-12-04 | End: 2017-12-04

## 2017-12-04 RX ORDER — ACETAMINOPHEN 325 MG/1
TABLET ORAL
Status: COMPLETED
Start: 2017-12-04 | End: 2017-12-04

## 2017-12-04 RX ORDER — 0.9 % SODIUM CHLORIDE 0.9 %
VIAL (ML) INJECTION
Status: DISCONTINUED
Start: 2017-12-04 | End: 2017-12-04

## 2017-12-04 RX ORDER — DIPHENHYDRAMINE HYDROCHLORIDE 50 MG/ML
25 INJECTION INTRAMUSCULAR; INTRAVENOUS ONCE
Status: COMPLETED | OUTPATIENT
Start: 2017-12-04 | End: 2017-12-04

## 2017-12-04 RX ORDER — ETODOLAC 500 MG/1
500 TABLET, FILM COATED ORAL 2 TIMES DAILY
Qty: 14 TABLET | Refills: 0 | Status: SHIPPED | OUTPATIENT
Start: 2017-12-04 | End: 2017-12-06

## 2017-12-04 RX ADMIN — DIPHENHYDRAMINE HYDROCHLORIDE 25 MG: 50 INJECTION INTRAMUSCULAR; INTRAVENOUS at 10:20:00

## 2017-12-04 RX ADMIN — ACETAMINOPHEN: 325 TABLET ORAL at 10:25:00

## 2017-12-04 NOTE — ED PROVIDER NOTES
Patient Seen in: [address]    History   Patient presents with:  Musculoskeletal Problem    Stated Complaint: left knee pain    HPI    HPI: Evon Mike is a 33year old female who presents with left knee pain that st above.    PSFH elements reviewed from today and agreed except as otherwise stated in HPI.     Physical Exam   ED Triage Vitals [12/04/17 1601]  BP: 126/78  Pulse: 77  Resp: 20  Temp: 98.4 °F (36.9 °C)  Temp src: Oral  SpO2: 100 %  O2 Device: None (Room air) possible for a visit in 1 week  If symptoms worsen      Medications Prescribed:  Discharge Medication List as of 12/4/2017  4:46 PM    START taking these medications    Etodolac 500 MG Oral Tab  Take 1 tablet (500 mg total) by mouth 2 (two) times daily. , P

## 2017-12-04 NOTE — PROGRESS NOTES
Pt arrived for iron infusion. Complains of heavy periods, is planning on having a hysterectomy. Denies any symptoms or side effects from previous infusions. Reports she is feeling well today.   Test dose given of 1 minute et pt monitored for 1 hour post i

## 2017-12-05 ENCOUNTER — NURSE TRIAGE (OUTPATIENT)
Dept: OTHER | Age: 32
End: 2017-12-05

## 2017-12-05 NOTE — TELEPHONE ENCOUNTER
Action Requested: Summary for Provider     []  Critical Lab, Recommendations Needed  [] Need Additional Advice  []   FYI    []   Need Orders  [] Need Medications Sent to Pharmacy  []  Other     SUMMARY: Appointment made with Dr Daryn Fleming on 12/6/17 at 10:45

## 2017-12-06 ENCOUNTER — OFFICE VISIT (OUTPATIENT)
Dept: FAMILY MEDICINE CLINIC | Facility: CLINIC | Age: 32
End: 2017-12-06

## 2017-12-06 VITALS
SYSTOLIC BLOOD PRESSURE: 107 MMHG | TEMPERATURE: 98 F | DIASTOLIC BLOOD PRESSURE: 71 MMHG | WEIGHT: 170 LBS | HEART RATE: 66 BPM | BODY MASS INDEX: 25.18 KG/M2 | HEIGHT: 69 IN | RESPIRATION RATE: 14 BRPM

## 2017-12-06 DIAGNOSIS — R11.0 NAUSEA: ICD-10-CM

## 2017-12-06 DIAGNOSIS — M25.562 ACUTE PAIN OF LEFT KNEE: ICD-10-CM

## 2017-12-06 DIAGNOSIS — R10.9 ABDOMINAL PAIN, UNSPECIFIED ABDOMINAL LOCATION: Primary | ICD-10-CM

## 2017-12-06 PROCEDURE — 99214 OFFICE O/P EST MOD 30 MIN: CPT | Performed by: FAMILY MEDICINE

## 2017-12-06 PROCEDURE — 99212 OFFICE O/P EST SF 10 MIN: CPT | Performed by: FAMILY MEDICINE

## 2017-12-06 PROCEDURE — 81025 URINE PREGNANCY TEST: CPT | Performed by: FAMILY MEDICINE

## 2017-12-06 NOTE — PROGRESS NOTES
HPI:    Hieu Duran is a 33year old female presents to clinic with concerns regarding abd pain. States that yesterday she had an iron infusion, developed pain immediately afterwards.  States that she also had dark brown thick discharge that looked sl needed for Wheezing.  Disp:  Rfl:        Allergies:  No Known Allergies      ROS:   See HPI     PHYSICAL EXAM:      12/06/17  1101   BP: 107/71   BP Location: Left arm   Patient Position: Sitting   Cuff Size: adult   Pulse: 66   Resp: 14   Temp: 97.7 °F (36

## 2017-12-14 ENCOUNTER — TELEPHONE (OUTPATIENT)
Dept: OBGYN CLINIC | Facility: CLINIC | Age: 32
End: 2017-12-14

## 2017-12-14 ENCOUNTER — OFFICE VISIT (OUTPATIENT)
Dept: FAMILY MEDICINE CLINIC | Facility: CLINIC | Age: 32
End: 2017-12-14

## 2017-12-14 VITALS
OXYGEN SATURATION: 98 % | TEMPERATURE: 98 F | SYSTOLIC BLOOD PRESSURE: 124 MMHG | RESPIRATION RATE: 16 BRPM | HEART RATE: 78 BPM | DIASTOLIC BLOOD PRESSURE: 74 MMHG

## 2017-12-14 DIAGNOSIS — H10.32 ACUTE CONJUNCTIVITIS OF LEFT EYE, UNSPECIFIED ACUTE CONJUNCTIVITIS TYPE: Primary | ICD-10-CM

## 2017-12-14 PROCEDURE — 99213 OFFICE O/P EST LOW 20 MIN: CPT | Performed by: NURSE PRACTITIONER

## 2017-12-14 RX ORDER — POLYMYXIN B SULFATE AND TRIMETHOPRIM 1; 10000 MG/ML; [USP'U]/ML
1 SOLUTION OPHTHALMIC EVERY 4 HOURS
Qty: 10 ML | Refills: 0 | Status: SHIPPED | OUTPATIENT
Start: 2017-12-14 | End: 2017-12-27 | Stop reason: ALTCHOICE

## 2017-12-15 ENCOUNTER — PATIENT MESSAGE (OUTPATIENT)
Dept: FAMILY MEDICINE CLINIC | Facility: CLINIC | Age: 32
End: 2017-12-15

## 2017-12-15 NOTE — TELEPHONE ENCOUNTER
Please schedule patient for a total abdominal hysterectomy and bilateral salpinigectomy. I will need one of the other doctors to assist (try Dr. Teodora Rogers when we have a date since this patient started with him).

## 2017-12-15 NOTE — TELEPHONE ENCOUNTER
Visit Diagnoses         Menorrhagia with regular cycle N92.0      Iron deficiency anemia due to chronic blood loss D50.0      Problem List     Are these the diagnosis. Please advice. Also, pt has medical card which requires a consent form for this procedure.

## 2017-12-15 NOTE — PROGRESS NOTES
CHIEF COMPLAINT:   No chief complaint on file. HPI:   Ben Guerin is a 33year old female who presents with chief complaint of \"pink eye\". Symptoms began  1  days ago. Symptoms have been progressing since onset.    Patient reports left eye rednes NEURO: denies headaches     EXAM:   There were no vitals taken for this visit.   GENERAL: well developed, well nourished,in no apparent distress  SKIN: no rashes,no suspicious lesions  EYES: PERRLA, EOMI, lefft conjunctiva erythematous, injected, clear disc · Apply a cold pack over the eye for 20 minutes at a time. This will reduce pain. To make a cold pack, put ice cubes in a plastic bag that seals at the top. Wrap the bag in a clean, thin towel or cloth.   · Artificial tears may be prescribed to reduce irrit

## 2017-12-18 ENCOUNTER — TELEPHONE (OUTPATIENT)
Dept: OTHER | Age: 32
End: 2017-12-18

## 2017-12-18 NOTE — TELEPHONE ENCOUNTER
Dr. Chace Santoyo, does she fall in the exception request for this procedure? Please advice so I can inform the patient.

## 2017-12-18 NOTE — TELEPHONE ENCOUNTER
From: Nathan Matute  To: Aram Stafford MD  Sent: 12/15/2017 3:32 PM CST  Subject: Visit Follow-up Question    Hello,    Yesterday I was seen in urgent care for a cold and pink eye in my left eye. Now I am getting symptoms of a UTI.

## 2017-12-18 NOTE — TELEPHONE ENCOUNTER
Pt contacted she reports she took over the counter medications from [facility] and does not need us. No further action is needed. Pt instructed to call office directly and ask to speak to a nurse if not feeling well for future reference.     ----- Message --

## 2017-12-20 NOTE — TELEPHONE ENCOUNTER
Called pt and informed of surgery date, time and location. She Understood and verbalized agreement. Informed her of form needed to be signed. She said she will come in next Tuesday to sign form. Dr. Christiane Doe, do you want me to schedule pt for her pre-op? Dr. Sandrine Roque sent Baron Velasquez a message regarding your request for office start time. Faxed for janell updated book.

## 2017-12-20 NOTE — TELEPHONE ENCOUNTER
Dr. Yohana Bartlett and Dr. Lluvia Bal, Jan 4th at 7:30 is available. Will this work for both of you?

## 2017-12-27 ENCOUNTER — TELEPHONE (OUTPATIENT)
Dept: OBGYN CLINIC | Facility: CLINIC | Age: 32
End: 2017-12-27

## 2017-12-27 NOTE — TELEPHONE ENCOUNTER
Dontrell Gallegos is going to talk to macario to see the timeframe, normally takes 7-10 days, pt dropped off forms last night

## 2017-12-27 NOTE — TELEPHONE ENCOUNTER
Pt scheduled pre-op for 1/3 at [facility].      Routing to Dr. Alok King for [id number] [address]

## 2017-12-28 NOTE — TELEPHONE ENCOUNTER
FMLA form for Dr. Rhonda Cao received in [facility] Rosalee Russ. Logged for processing. DANNY packet emailed to pt [email or web address]).  NK

## 2017-12-28 NOTE — TELEPHONE ENCOUNTER
Dr. Nora Covarrubias    Please sign off on form:  -Highlight the patient and hit \"Chart\" button. -In Chart Review, w/in the Encounter tab - open the Telephone call encounter for 12-27-17. Scroll down.  -Click \"scan on\" blue Hyperlink under \"Media\" heading for [id number].  -Click on Acknowledge button at the bottom right corner and left-click onto image, signature stamp appears and drag signature to Provider signature line. Stamp will turn blue. Close window.     Thank you,  Edmar Hernandez

## 2018-01-03 ENCOUNTER — LAB ENCOUNTER (OUTPATIENT)
Dept: LAB | Age: 33
End: 2018-01-03
Attending: OBSTETRICS & GYNECOLOGY
Payer: MEDICAID

## 2018-01-03 ENCOUNTER — OFFICE VISIT (OUTPATIENT)
Dept: OBGYN CLINIC | Facility: CLINIC | Age: 33
End: 2018-01-03

## 2018-01-03 VITALS
SYSTOLIC BLOOD PRESSURE: 116 MMHG | WEIGHT: 169.81 LBS | HEART RATE: 73 BPM | BODY MASS INDEX: 26 KG/M2 | DIASTOLIC BLOOD PRESSURE: 73 MMHG

## 2018-01-03 DIAGNOSIS — N92.0 MENORRHAGIA WITH REGULAR CYCLE: Primary | ICD-10-CM

## 2018-01-03 DIAGNOSIS — D50.0 IRON DEFICIENCY ANEMIA DUE TO CHRONIC BLOOD LOSS: ICD-10-CM

## 2018-01-03 DIAGNOSIS — Z01.818 PRE-OP TESTING: ICD-10-CM

## 2018-01-03 LAB
ANTIBODY SCREEN: NEGATIVE
BASOPHILS # BLD: 0 K/UL (ref 0–0.2)
BASOPHILS NFR BLD: 1 %
EOSINOPHIL # BLD: 0.3 K/UL (ref 0–0.7)
EOSINOPHIL NFR BLD: 5 %
ERYTHROCYTE [DISTWIDTH] IN BLOOD BY AUTOMATED COUNT: 19 % (ref 11–15)
HCT VFR BLD AUTO: 31.5 % (ref 35–48)
HGB BLD-MCNC: 9.9 G/DL (ref 12–16)
IRON SATN MFR SERPL: 13 % (ref 15–50)
IRON SERPL-MCNC: 36 MCG/DL (ref 28–170)
LYMPHOCYTES # BLD: 2.2 K/UL (ref 1–4)
LYMPHOCYTES NFR BLD: 33 %
MCH RBC QN AUTO: 20.6 PG (ref 27–32)
MCHC RBC AUTO-ENTMCNC: 31.5 G/DL (ref 32–37)
MCV RBC AUTO: 65.3 FL (ref 80–100)
MONOCYTES # BLD: 0.5 K/UL (ref 0–1)
MONOCYTES NFR BLD: 7 %
NEUTROPHILS # BLD AUTO: 3.7 K/UL (ref 1.8–7.7)
NEUTROPHILS NFR BLD: 55 %
PLATELET # BLD AUTO: 274 K/UL (ref 140–400)
PMV BLD AUTO: 8.6 FL (ref 7.4–10.3)
RBC # BLD AUTO: 4.82 M/UL (ref 3.7–5.4)
RH BLOOD TYPE: POSITIVE
TIBC SERPL-MCNC: 282 MCG/DL (ref 228–428)
TRANSFERRIN SERPL-MCNC: 214 MG/DL (ref 192–382)
WBC # BLD AUTO: 6.7 K/UL (ref 4–11)

## 2018-01-03 PROCEDURE — 86901 BLOOD TYPING SEROLOGIC RH(D): CPT

## 2018-01-03 PROCEDURE — 86850 RBC ANTIBODY SCREEN: CPT

## 2018-01-03 PROCEDURE — 99213 OFFICE O/P EST LOW 20 MIN: CPT | Performed by: OBSTETRICS & GYNECOLOGY

## 2018-01-03 PROCEDURE — 83540 ASSAY OF IRON: CPT

## 2018-01-03 PROCEDURE — 85025 COMPLETE CBC W/AUTO DIFF WBC: CPT

## 2018-01-03 PROCEDURE — 84466 ASSAY OF TRANSFERRIN: CPT

## 2018-01-03 PROCEDURE — 86900 BLOOD TYPING SEROLOGIC ABO: CPT

## 2018-01-03 PROCEDURE — 36415 COLL VENOUS BLD VENIPUNCTURE: CPT

## 2018-01-04 ENCOUNTER — HOSPITAL ENCOUNTER (INPATIENT)
Facility: HOSPITAL | Age: 33
LOS: 2 days | Discharge: HOME | DRG: 743 | End: 2018-01-06
Attending: OBSTETRICS & GYNECOLOGY | Admitting: OBSTETRICS & GYNECOLOGY
Payer: MEDICAID

## 2018-01-04 ENCOUNTER — ANESTHESIA (OUTPATIENT)
Dept: SURGERY | Facility: HOSPITAL | Age: 33
DRG: 743 | End: 2018-01-04
Payer: MEDICAID

## 2018-01-04 ENCOUNTER — ANESTHESIA EVENT (OUTPATIENT)
Dept: SURGERY | Facility: HOSPITAL | Age: 33
DRG: 743 | End: 2018-01-04
Payer: MEDICAID

## 2018-01-04 ENCOUNTER — SURGERY (OUTPATIENT)
Age: 33
End: 2018-01-04

## 2018-01-04 DIAGNOSIS — Z01.818 PRE-OP TESTING: ICD-10-CM

## 2018-01-04 DIAGNOSIS — D50.0 IRON DEFICIENCY ANEMIA DUE TO CHRONIC BLOOD LOSS: ICD-10-CM

## 2018-01-04 DIAGNOSIS — N92.0 MENORRHAGIA WITH REGULAR CYCLE: Primary | ICD-10-CM

## 2018-01-04 LAB — B-HCG UR QL: NEGATIVE

## 2018-01-04 PROCEDURE — 0UT90ZZ RESECTION OF UTERUS, OPEN APPROACH: ICD-10-PCS | Performed by: OBSTETRICS & GYNECOLOGY

## 2018-01-04 PROCEDURE — 0UT70ZZ RESECTION OF BILATERAL FALLOPIAN TUBES, OPEN APPROACH: ICD-10-PCS | Performed by: OBSTETRICS & GYNECOLOGY

## 2018-01-04 PROCEDURE — 99223 1ST HOSP IP/OBS HIGH 75: CPT | Performed by: INTERNAL MEDICINE

## 2018-01-04 PROCEDURE — 58150 TOTAL HYSTERECTOMY: CPT | Performed by: OBSTETRICS & GYNECOLOGY

## 2018-01-04 RX ORDER — ROCURONIUM BROMIDE 10 MG/ML
INJECTION, SOLUTION INTRAVENOUS AS NEEDED
Status: DISCONTINUED | OUTPATIENT
Start: 2018-01-04 | End: 2018-01-04 | Stop reason: SURG

## 2018-01-04 RX ORDER — MORPHINE SULFATE 2 MG/ML
2 INJECTION, SOLUTION INTRAMUSCULAR; INTRAVENOUS EVERY 10 MIN PRN
Status: DISCONTINUED | OUTPATIENT
Start: 2018-01-04 | End: 2018-01-04 | Stop reason: HOSPADM

## 2018-01-04 RX ORDER — MORPHINE SULFATE 2 MG/ML
2 INJECTION, SOLUTION INTRAMUSCULAR; INTRAVENOUS EVERY 30 MIN PRN
Status: DISCONTINUED | OUTPATIENT
Start: 2018-01-04 | End: 2018-01-05

## 2018-01-04 RX ORDER — HYDROCODONE BITARTRATE AND ACETAMINOPHEN 5; 325 MG/1; MG/1
1 TABLET ORAL AS NEEDED
Status: DISCONTINUED | OUTPATIENT
Start: 2018-01-04 | End: 2018-01-04 | Stop reason: HOSPADM

## 2018-01-04 RX ORDER — DEXAMETHASONE SODIUM PHOSPHATE 4 MG/ML
VIAL (ML) INJECTION AS NEEDED
Status: DISCONTINUED | OUTPATIENT
Start: 2018-01-04 | End: 2018-01-04 | Stop reason: SURG

## 2018-01-04 RX ORDER — CEFAZOLIN SODIUM/WATER 2 G/20 ML
2 SYRINGE (ML) INTRAVENOUS ONCE
Status: COMPLETED | OUTPATIENT
Start: 2018-01-04 | End: 2018-01-04

## 2018-01-04 RX ORDER — BUPIVACAINE HYDROCHLORIDE AND EPINEPHRINE 2.5; 5 MG/ML; UG/ML
INJECTION, SOLUTION INFILTRATION; PERINEURAL AS NEEDED
Status: DISCONTINUED | OUTPATIENT
Start: 2018-01-04 | End: 2018-01-04 | Stop reason: HOSPADM

## 2018-01-04 RX ORDER — MORPHINE SULFATE 4 MG/ML
4 INJECTION, SOLUTION INTRAMUSCULAR; INTRAVENOUS EVERY 10 MIN PRN
Status: DISCONTINUED | OUTPATIENT
Start: 2018-01-04 | End: 2018-01-04 | Stop reason: HOSPADM

## 2018-01-04 RX ORDER — DIPHENHYDRAMINE HYDROCHLORIDE 50 MG/ML
12.5 INJECTION INTRAMUSCULAR; INTRAVENOUS EVERY 4 HOURS PRN
Status: DISCONTINUED | OUTPATIENT
Start: 2018-01-04 | End: 2018-01-05

## 2018-01-04 RX ORDER — SODIUM CHLORIDE 0.9 % (FLUSH) 0.9 %
10 SYRINGE (ML) INJECTION AS NEEDED
Status: DISCONTINUED | OUTPATIENT
Start: 2018-01-04 | End: 2018-01-06

## 2018-01-04 RX ORDER — ONDANSETRON 2 MG/ML
4 INJECTION INTRAMUSCULAR; INTRAVENOUS ONCE AS NEEDED
Status: DISCONTINUED | OUTPATIENT
Start: 2018-01-04 | End: 2018-01-04 | Stop reason: HOSPADM

## 2018-01-04 RX ORDER — HYDROCODONE BITARTRATE AND ACETAMINOPHEN 5; 325 MG/1; MG/1
2 TABLET ORAL AS NEEDED
Status: DISCONTINUED | OUTPATIENT
Start: 2018-01-04 | End: 2018-01-04 | Stop reason: HOSPADM

## 2018-01-04 RX ORDER — ONDANSETRON 2 MG/ML
INJECTION INTRAMUSCULAR; INTRAVENOUS AS NEEDED
Status: DISCONTINUED | OUTPATIENT
Start: 2018-01-04 | End: 2018-01-04 | Stop reason: SURG

## 2018-01-04 RX ORDER — HYDROMORPHONE HYDROCHLORIDE 1 MG/ML
0.4 INJECTION, SOLUTION INTRAMUSCULAR; INTRAVENOUS; SUBCUTANEOUS EVERY 5 MIN PRN
Status: DISCONTINUED | OUTPATIENT
Start: 2018-01-04 | End: 2018-01-04 | Stop reason: HOSPADM

## 2018-01-04 RX ORDER — FAMOTIDINE 20 MG/1
20 TABLET ORAL ONCE
Status: DISCONTINUED | OUTPATIENT
Start: 2018-01-04 | End: 2018-01-04 | Stop reason: HOSPADM

## 2018-01-04 RX ORDER — NALBUPHINE HCL 10 MG/ML
2.5 AMPUL (ML) INJECTION EVERY 4 HOURS PRN
Status: DISCONTINUED | OUTPATIENT
Start: 2018-01-04 | End: 2018-01-05

## 2018-01-04 RX ORDER — DIPHENHYDRAMINE HYDROCHLORIDE 50 MG/ML
25 INJECTION INTRAMUSCULAR; INTRAVENOUS ONCE
Status: COMPLETED | OUTPATIENT
Start: 2018-01-04 | End: 2018-01-04

## 2018-01-04 RX ORDER — ENOXAPARIN SODIUM 100 MG/ML
40 INJECTION SUBCUTANEOUS DAILY
Status: DISCONTINUED | OUTPATIENT
Start: 2018-01-04 | End: 2018-01-06

## 2018-01-04 RX ORDER — MORPHINE SULFATE 10 MG/ML
6 INJECTION, SOLUTION INTRAMUSCULAR; INTRAVENOUS EVERY 10 MIN PRN
Status: DISCONTINUED | OUTPATIENT
Start: 2018-01-04 | End: 2018-01-04 | Stop reason: HOSPADM

## 2018-01-04 RX ORDER — NALOXONE HYDROCHLORIDE 0.4 MG/ML
0.08 INJECTION, SOLUTION INTRAMUSCULAR; INTRAVENOUS; SUBCUTANEOUS
Status: DISCONTINUED | OUTPATIENT
Start: 2018-01-04 | End: 2018-01-05

## 2018-01-04 RX ORDER — NALOXONE HYDROCHLORIDE 0.4 MG/ML
80 INJECTION, SOLUTION INTRAMUSCULAR; INTRAVENOUS; SUBCUTANEOUS AS NEEDED
Status: DISCONTINUED | OUTPATIENT
Start: 2018-01-04 | End: 2018-01-04 | Stop reason: HOSPADM

## 2018-01-04 RX ORDER — SODIUM CHLORIDE, SODIUM LACTATE, POTASSIUM CHLORIDE, CALCIUM CHLORIDE 600; 310; 30; 20 MG/100ML; MG/100ML; MG/100ML; MG/100ML
INJECTION, SOLUTION INTRAVENOUS CONTINUOUS
Status: DISCONTINUED | OUTPATIENT
Start: 2018-01-04 | End: 2018-01-04 | Stop reason: HOSPADM

## 2018-01-04 RX ORDER — KETOROLAC TROMETHAMINE 30 MG/ML
30 INJECTION, SOLUTION INTRAMUSCULAR; INTRAVENOUS EVERY 6 HOURS
Status: COMPLETED | OUTPATIENT
Start: 2018-01-04 | End: 2018-01-05

## 2018-01-04 RX ORDER — NEOSTIGMINE METHYLSULFATE 0.5 MG/ML
INJECTION INTRAVENOUS AS NEEDED
Status: DISCONTINUED | OUTPATIENT
Start: 2018-01-04 | End: 2018-01-04 | Stop reason: SURG

## 2018-01-04 RX ORDER — HYDROMORPHONE HYDROCHLORIDE 1 MG/ML
0.2 INJECTION, SOLUTION INTRAMUSCULAR; INTRAVENOUS; SUBCUTANEOUS EVERY 5 MIN PRN
Status: DISCONTINUED | OUTPATIENT
Start: 2018-01-04 | End: 2018-01-04 | Stop reason: HOSPADM

## 2018-01-04 RX ORDER — LIDOCAINE HYDROCHLORIDE 10 MG/ML
INJECTION, SOLUTION EPIDURAL; INFILTRATION; INTRACAUDAL; PERINEURAL AS NEEDED
Status: DISCONTINUED | OUTPATIENT
Start: 2018-01-04 | End: 2018-01-04 | Stop reason: SURG

## 2018-01-04 RX ORDER — HYDROMORPHONE HYDROCHLORIDE 1 MG/ML
0.6 INJECTION, SOLUTION INTRAMUSCULAR; INTRAVENOUS; SUBCUTANEOUS EVERY 5 MIN PRN
Status: DISCONTINUED | OUTPATIENT
Start: 2018-01-04 | End: 2018-01-04 | Stop reason: HOSPADM

## 2018-01-04 RX ORDER — SODIUM CHLORIDE, SODIUM LACTATE, POTASSIUM CHLORIDE, CALCIUM CHLORIDE 600; 310; 30; 20 MG/100ML; MG/100ML; MG/100ML; MG/100ML
INJECTION, SOLUTION INTRAVENOUS CONTINUOUS
Status: DISCONTINUED | OUTPATIENT
Start: 2018-01-04 | End: 2018-01-06

## 2018-01-04 RX ORDER — GLYCOPYRROLATE 0.2 MG/ML
INJECTION INTRAMUSCULAR; INTRAVENOUS AS NEEDED
Status: DISCONTINUED | OUTPATIENT
Start: 2018-01-04 | End: 2018-01-04 | Stop reason: SURG

## 2018-01-04 RX ORDER — ONDANSETRON 2 MG/ML
4 INJECTION INTRAMUSCULAR; INTRAVENOUS EVERY 6 HOURS PRN
Status: DISCONTINUED | OUTPATIENT
Start: 2018-01-04 | End: 2018-01-05

## 2018-01-04 RX ORDER — ACETAMINOPHEN 500 MG
1000 TABLET ORAL ONCE
Status: COMPLETED | OUTPATIENT
Start: 2018-01-04 | End: 2018-01-04

## 2018-01-04 RX ORDER — METOCLOPRAMIDE 10 MG/1
10 TABLET ORAL ONCE
Status: DISCONTINUED | OUTPATIENT
Start: 2018-01-04 | End: 2018-01-04 | Stop reason: HOSPADM

## 2018-01-04 RX ADMIN — DEXAMETHASONE SODIUM PHOSPHATE 4 MG: 4 MG/ML VIAL (ML) INJECTION at 07:37:00

## 2018-01-04 RX ADMIN — GLYCOPYRROLATE 0.8 MG: 0.2 INJECTION INTRAMUSCULAR; INTRAVENOUS at 09:09:00

## 2018-01-04 RX ADMIN — NEOSTIGMINE METHYLSULFATE 4 MG: 0.5 INJECTION INTRAVENOUS at 09:09:00

## 2018-01-04 RX ADMIN — CEFAZOLIN SODIUM/WATER 2 G: 2 G/20 ML SYRINGE (ML) INTRAVENOUS at 07:43:00

## 2018-01-04 RX ADMIN — GLYCOPYRROLATE 0.2 MG: 0.2 INJECTION INTRAMUSCULAR; INTRAVENOUS at 07:26:00

## 2018-01-04 RX ADMIN — ONDANSETRON 4 MG: 2 INJECTION INTRAMUSCULAR; INTRAVENOUS at 07:37:00

## 2018-01-04 RX ADMIN — LIDOCAINE HYDROCHLORIDE 25 MG: 10 INJECTION, SOLUTION EPIDURAL; INFILTRATION; INTRACAUDAL; PERINEURAL at 07:32:00

## 2018-01-04 RX ADMIN — SODIUM CHLORIDE, SODIUM LACTATE, POTASSIUM CHLORIDE, CALCIUM CHLORIDE: 600; 310; 30; 20 INJECTION, SOLUTION INTRAVENOUS at 07:21:00

## 2018-01-04 RX ADMIN — SODIUM CHLORIDE, SODIUM LACTATE, POTASSIUM CHLORIDE, CALCIUM CHLORIDE: 600; 310; 30; 20 INJECTION, SOLUTION INTRAVENOUS at 09:30:00

## 2018-01-04 RX ADMIN — SODIUM CHLORIDE, SODIUM LACTATE, POTASSIUM CHLORIDE, CALCIUM CHLORIDE: 600; 310; 30; 20 INJECTION, SOLUTION INTRAVENOUS at 09:03:00

## 2018-01-04 RX ADMIN — ROCURONIUM BROMIDE 2 MG: 10 INJECTION, SOLUTION INTRAVENOUS at 07:26:00

## 2018-01-04 NOTE — INTERVAL H&P NOTE
Pre-op Diagnosis: Menorrhagia with regular cycle; iron deficiency anemia to chronic blood loss     The above referenced H&P was reviewed by Tae Cosme MD, MD on 1/4/2018, the patient was examined and no significant changes have occurred in the patie

## 2018-01-04 NOTE — PROGRESS NOTES
[facility]  Obstetrics and Gynecology  Focused Gynecology Problem Exam  MD Kirk Culp Go is a 33year old female presenting for Pre-Op Exam (total abdominal hysterectomy on 1/4/18. Denies any concerns. )  .     HPI:   Patient prese Lv   7 AB            6 AB            5 AB            4 Term            3 Term            2 Term            1 Term      Caesarean             Menarche: 13years old   Period Cycle (Days): 28-30  Period Duration (Days): 3-5 days  Period Flow: yareli visualization of the endometrium and ovaries. Doppler evaluation of the ovaries was performed. FINDINGS:             UTERUS:     ENDOMETRIUM:           No fluid or mass in the endometrial canal.    MYOMETRIUM:             Normal echogenicity.   No wisam lesions. Urethral meatus appear wnl, no abnormal discharge or lesions noted. Bladder: well supported, urethra wnl, no lesions or fissures                     Vagina: normal pink mucosa, no lesions, normal clear discharge.                       Uter

## 2018-01-04 NOTE — PLAN OF CARE
Received patient in room 349 from PACU. Received report from [facility]. Patient VS stable,pain 0/10, scd on,  on 2L of O2. Red drainged noted on abd pad, underwear and maryam pad applied.   Patient oriented to room, bed in low position and call light within r

## 2018-01-04 NOTE — OPERATIVE REPORT
[facility]    PATIENT'S NAME: Armarinoelisa Parikh   ATTENDING PHYSICIAN: Itzel Hoffman MD   OPERATING PHYSICIAN: Itzel Hoffman MD   PATIENT ACCOUNT#:   [de-identified]    LOCATION:  [facility] [address] PACU [facility] 10  MEDICAL RECORD #:   [id number]       D above-dictated findings were noted. An Jeffry self-retaining retractor was placed. The uterus was grasped at the bilateral cornea and proper ovarian ligaments with Kocher clamps.   The tubes were coagulated and excised beginning at the fimbria to the corn entered. The incision was extended circumferentially until the uterus and cervix were removed and sent to Pathology. The vaginal corners were then suture ligated. A circumferential running suture was made around the vaginal cuff.   The vaginal cuff was t

## 2018-01-04 NOTE — H&P
[facility], [facility]  Obstetrics and Gynecology  Focused Gynecology Problem Exam  Glenys Miller MD    Latisha Hernandez is a 33year old female presenting for Pre-Op Exam (total abdominal hysterectomy on 1/4/18. Denies any concerns. )  .     HPI:   Patient prese Lv   7 AB            6 AB            5 AB            4 Term            3 Term            2 Term            1 Term      Caesarean             Menarche: 13years old   Period Cycle (Days): 28-30  Period Duration (Days): 3-5 days  Period Flow: yareli visualization of the endometrium and ovaries. Doppler evaluation of the ovaries was performed. FINDINGS:             UTERUS:     ENDOMETRIUM:           No fluid or mass in the endometrial canal.    MYOMETRIUM:             Normal echogenicity.   No wisam lesions. Urethral meatus appear wnl, no abnormal discharge or lesions noted. Bladder: well supported, urethra wnl, no lesions or fissures                     Vagina: normal pink mucosa, no lesions, normal clear discharge.                       Uter

## 2018-01-04 NOTE — BRIEF OP NOTE
[facility] OPERATING ROOM  Operative Note     Eva Berumen Location: OR   [facility] [id number] MRN [id number]   Admission Date 1/4/2018 Operation Date 1/4/2018   Attending Physician Laura Bull MD Operating Physician Rosario Bradley MD, MD

## 2018-01-04 NOTE — ANESTHESIA PREPROCEDURE EVALUATION
Anesthesia PreOp Note    HPI:     Hieu Duran is a 33year old female who presents for preoperative consultation requested by: Rolando Garrison MD    Date of Surgery: 1/4/2018    Procedure(s):  ABDOMINAL HYSTERECTOMY  Indication: Menorrhagia with reg No current [facility]-ordered outpatient prescriptions on file.     No Known Allergies    Family History   Problem Relation Age of Onset   • Diabetes Father    • Heart Disorder Father    • Hypertension Father    • Diabetes Mother    • Heart Disorder Mother regular  Rate: normal  ROS comment: History of PE    Neuro/Psych - negative ROS     GI/Hepatic/Renal - negative ROS     Endo/Other - negative ROS   (+) blood dyscrasia (Anemia),   Abdominal              Anesthesia Plan:   ASA:  2  Plan:   General  Airway:

## 2018-01-04 NOTE — ANESTHESIA POSTPROCEDURE EVALUATION
Patient: Isamar Villalpando    Procedure Summary     Date:  01/04/18 Room / Location:  [address] [place] [address] MAIN OR 03 / [address] [place] [address] MAIN OR    Anesthesia Start:  0725 Anesthesia Stop:  5056    Procedure:  ABDOMINAL HYSTERECTOMY (N/A Abdomen) Diagnosis:  (Menorrhagia with regular cyc

## 2018-01-05 LAB
ERYTHROCYTE [DISTWIDTH] IN BLOOD BY AUTOMATED COUNT: 18.9 % (ref 11–15)
HCT VFR BLD AUTO: 26.9 % (ref 35–48)
HGB BLD-MCNC: 8.4 G/DL (ref 12–16)
MCH RBC QN AUTO: 20.6 PG (ref 27–32)
MCHC RBC AUTO-ENTMCNC: 31.2 G/DL (ref 32–37)
MCV RBC AUTO: 65.8 FL (ref 80–100)
PLATELET # BLD AUTO: 239 K/UL (ref 140–400)
PMV BLD AUTO: 8.7 FL (ref 7.4–10.3)
RBC # BLD AUTO: 4.09 M/UL (ref 3.7–5.4)
WBC # BLD AUTO: 10.7 K/UL (ref 4–11)

## 2018-01-05 RX ORDER — SODIUM CHLORIDE, SODIUM LACTATE, POTASSIUM CHLORIDE, CALCIUM CHLORIDE 600; 310; 30; 20 MG/100ML; MG/100ML; MG/100ML; MG/100ML
INJECTION, SOLUTION INTRAVENOUS ONCE
Status: COMPLETED | OUTPATIENT
Start: 2018-01-05 | End: 2018-01-05

## 2018-01-05 RX ORDER — IBUPROFEN 600 MG/1
600 TABLET ORAL EVERY 6 HOURS PRN
Status: DISCONTINUED | OUTPATIENT
Start: 2018-01-05 | End: 2018-01-06

## 2018-01-05 RX ORDER — SIMETHICONE 80 MG
80 TABLET,CHEWABLE ORAL EVERY 6 HOURS PRN
Status: DISCONTINUED | OUTPATIENT
Start: 2018-01-05 | End: 2018-01-06

## 2018-01-05 RX ORDER — DIPHENHYDRAMINE HCL 25 MG
25 CAPSULE ORAL EVERY 6 HOURS PRN
Status: DISCONTINUED | OUTPATIENT
Start: 2018-01-05 | End: 2018-01-06

## 2018-01-05 RX ORDER — DOCUSATE SODIUM 100 MG/1
100 CAPSULE, LIQUID FILLED ORAL 2 TIMES DAILY
Status: DISCONTINUED | OUTPATIENT
Start: 2018-01-05 | End: 2018-01-06

## 2018-01-05 RX ORDER — HYDROCODONE BITARTRATE AND ACETAMINOPHEN 5; 325 MG/1; MG/1
1 TABLET ORAL EVERY 6 HOURS PRN
Status: DISCONTINUED | OUTPATIENT
Start: 2018-01-05 | End: 2018-01-06

## 2018-01-05 NOTE — PLAN OF CARE
Called Dr Champagne Must about decreased urine output after anthony. Orders received for 500cc bolus.   And continue with 8 hr rate until 2 voids greater than 200ml

## 2018-01-05 NOTE — CONSULTS
[facility]    PATIENT'S NAME: Jm Hutchins   ATTENDING PHYSICIAN: Juana Brunner, MD   CONSULTING PHYSICIAN: Moustapha Sandoval.  MD Estefanía   PATIENT ACCOUNT#:   [id number]    LOCATION:  [address] [place] [address] #:   [id number]       DATE OF BIRTH: hematologic disorders. SOCIAL HISTORY:  The patient denies alcohol, tobacco, or illicit drug use. MEDICATIONS:  Fluticasone, albuterol. ALLERGIES:  No known drug allergies. REVIEW OF SYSTEMS:  Negative x12.       PHYSICAL EXAMINATION:    GENERAL had a slight provoking factor with her hormonal contraception, she has remained off anticoagulation and has had no events since then.   We do recommend DVT prophylaxis with chemical prophylaxis using Lovenox while she is hospitalized with early ambulation i

## 2018-01-06 VITALS
RESPIRATION RATE: 16 BRPM | HEIGHT: 68 IN | BODY MASS INDEX: 24.86 KG/M2 | DIASTOLIC BLOOD PRESSURE: 67 MMHG | WEIGHT: 164 LBS | TEMPERATURE: 98 F | OXYGEN SATURATION: 98 % | SYSTOLIC BLOOD PRESSURE: 113 MMHG | HEART RATE: 80 BPM

## 2018-01-06 RX ORDER — HYDROCODONE BITARTRATE AND ACETAMINOPHEN 5; 325 MG/1; MG/1
1 TABLET ORAL EVERY 6 HOURS PRN
Qty: 20 TABLET | Refills: 0 | Status: SHIPPED | OUTPATIENT
Start: 2018-01-06 | End: 2018-01-19 | Stop reason: ALTCHOICE

## 2018-01-06 RX ORDER — IBUPROFEN 600 MG/1
600 TABLET ORAL EVERY 6 HOURS PRN
Qty: 30 TABLET | Refills: 1 | Status: ON HOLD | OUTPATIENT
Start: 2018-01-06 | End: 2018-05-03

## 2018-01-06 RX ORDER — PSEUDOEPHEDRINE HCL 30 MG
100 TABLET ORAL 2 TIMES DAILY
Qty: 30 CAPSULE | Refills: 0 | Status: SHIPPED | OUTPATIENT
Start: 2018-01-06 | End: 2018-02-21

## 2018-01-06 NOTE — PROGRESS NOTES
[facility] HOSP - [facility]    Post Operative Progress Note    Satnam Hoang Patient Status:  Inpatient    1985 MRN [id number]   Location [facility] 3SE Attending Alexis Cerda MD   Hosp Day # 2 PCP Bharat Perez MD     SUBJECTIVE: today. We will advance diet as tolerated. We will DC PCA and IV Toradol and begin p.o. pain management. We will check I's and O's.     Gita Berumen MD  1/6/2018  2:33 PM

## 2018-01-06 NOTE — DISCHARGE SUMMARY
[facility]    daVinci Procedure Discharge Summary    Isamar Villalpando Patient Status:  Inpatient    1985 MRN [id number]   Location [facility] 3SE Attending Ana Paula Nayak MD   Hosp Day # 2 PCP Cyndi Day MD     [address] Course: A: 33 y.o. [id number] was admitted for total abdominal hysterectomy and bilateral salpingectomy due to history of heavy periods and previous history of severe anemia requiring blood transfusion as well as prior DVT and pulmonary embolism.   Patient's p

## 2018-01-17 NOTE — PAYOR COMM NOTE
--------------  ADMISSION REVIEW     Payor: Feliciano Mae #:  [id number]  Authorization Number: [id number]    Admit date: 1/4/18  Admit time: 4624   DISCHARGED 1/6/2018      Admitting Physician: Cooper Cooper Patient is here today to discuss total abdominal hysterectomy and bilateral salpingectomy which she is scheduled for tomorrow. Patient is having heavy periods which has resulted in a severe iron deficiency anemia.   Patient has been treated with a progeste Diagnosis Date   • Anemia    • History of blood transfusion 2016   • Knee swelling    • Pulmonary embolism ([facility] [place] [address] 2016       Past Surgical History:  No date: ANESTH, SECTION  No date:    and : COLECTOMY  No date: OTHER      Comment: OVARIES AND ADNEXA: Normal Doppler arterial and venous flow in both ovaries. RIGHT:              Multiple small follicular cysts. LEFT:                 Multiple small follicular cysts.      CUL-DE-SAC:                Small amount of probable Uterus: mobile, non tender, normal size                     Cervix: Normal                      Adnexa: non tender, no masses, normal size     ASSESSMENT:    A: 33 y.o. [id number] with history of heavy menses and severe anemia.   Pt w/ histo PREOPERATIVE DIAGNOSIS:    1. Menorrhagia with regular cycles. 2.       Severe iron deficiency anemia due to chronic blood loss. POSTOPERATIVE DIAGNOSIS:    1. Menorrhagia with regular cycles.   2.       Severe iron deficiency anemia due to ch PROCEDURE:  After informed consent was obtained, the patient was prepped and draped in the usual sterile fashion. A scalpel used to make a Pfannenstiel skin incision through her previous incision.   The incision was carried down to the underlying fascia an ligated. The vesicouterine peritoneum was dissected toward the contralateral side. The bladder was then dissected off of the lower uterine segment and cervix. In both a blunt and sharp fashion. The left broad ligament was entered in a blunt fashion.   Constanza Chisholm patient was transferred to the recovery room in good condition.     Dictated By Delio Watson MD  d:     01/04/2018 09:39:28  t:      01/04/2018 09:59:30  Job   [id number]  JJF/     cc:        Delio Watson MD           Progress Notes DVT Evaluation:  No evidence of DVT seen on physical exam.      Data Reviewed:        ASSESSMENT/PLAN:     A: 33 y [id number] status post total abdominal hysterectomy and bilateral salpingectomy, post operative day #1 in good condition.   Patient with a histor Temp:  [97.7 °F (36.5 °C)-98.6 °F (37 °C)] 97.7 °F (36.5 °C)  Pulse:  [68-75] 73  Resp:  [16] 16  BP: (110-122)/(65-74) 119/65  Input/Output:     Intake/Output Summary (Last 24 hours) at 01/06/18 1441  Last data filed at 01/05/18 1500    Gross per 24 hour Surgical Procedures      Case IDs Date Procedure Surgeon Location Status     [id number] 1/4/18 ABDOMINAL HYSTERECTOMY Preston Resendiz MD [address] [place] [address] MAIN OR Comp             Discharge Plan:   Discharge Condition: Good     Discharge medications:  Current Discharge M REQUESTING PHYSICIAN:  Dr. Sterling Dinh:  Menorrhagia with severe iron-deficiency anemia.   Previous pulmonary embolism in June 2016.     HISTORY OF PRESENT ILLNESS:  The patient is a pleasant 27-year-old female currently hosp VITAL SIGNS:  ECOG performance status is 0. Temperature is 37.3, pulse 82, respiratory rate 17, blood pressure 127/61, pulse oximetry 99% on room air. Weight 74 kg. HEENT:  Moist mucous membranes. Oropharynx clear. NECK:  Supple.   LUNGS:  Clear to [employer] ASSESSMENT AND PLAN:  The patient is a very pleasant 36-year-old female, well known to Hematology, with a history of iron-deficiency anemia secondary to menorrhagia requiring multiple IV iron infusions in the past now status post total abdominal hysterecto

## 2018-01-19 ENCOUNTER — OFFICE VISIT (OUTPATIENT)
Dept: HEMATOLOGY/ONCOLOGY | Facility: HOSPITAL | Age: 33
End: 2018-01-19
Attending: INTERNAL MEDICINE
Payer: MEDICAID

## 2018-01-19 ENCOUNTER — LAB ENCOUNTER (OUTPATIENT)
Dept: LAB | Facility: HOSPITAL | Age: 33
End: 2018-01-19
Attending: INTERNAL MEDICINE
Payer: MEDICAID

## 2018-01-19 VITALS
WEIGHT: 165 LBS | SYSTOLIC BLOOD PRESSURE: 106 MMHG | RESPIRATION RATE: 16 BRPM | HEIGHT: 68.5 IN | HEART RATE: 71 BPM | TEMPERATURE: 98 F | DIASTOLIC BLOOD PRESSURE: 69 MMHG | BODY MASS INDEX: 24.72 KG/M2

## 2018-01-19 DIAGNOSIS — Z86.711 HISTORY OF PULMONARY EMBOLISM: ICD-10-CM

## 2018-01-19 DIAGNOSIS — D50.9 IRON DEFICIENCY ANEMIA: ICD-10-CM

## 2018-01-19 DIAGNOSIS — D50.9 MICROCYTIC ANEMIA: ICD-10-CM

## 2018-01-19 DIAGNOSIS — N92.4 EXCESSIVE BLEEDING IN PREMENOPAUSAL PERIOD: ICD-10-CM

## 2018-01-19 DIAGNOSIS — D50.0 IRON DEFICIENCY ANEMIA DUE TO CHRONIC BLOOD LOSS: Primary | ICD-10-CM

## 2018-01-19 DIAGNOSIS — Z83.2 FAMILY HISTORY OF THALASSEMIA: ICD-10-CM

## 2018-01-19 LAB
BASOPHILS # BLD: 0 K/UL (ref 0–0.2)
BASOPHILS NFR BLD: 1 %
EOSINOPHIL # BLD: 0.8 K/UL (ref 0–0.7)
EOSINOPHIL NFR BLD: 9 %
ERYTHROCYTE [DISTWIDTH] IN BLOOD BY AUTOMATED COUNT: 19.8 % (ref 11–15)
HCT VFR BLD AUTO: 34.3 % (ref 35–48)
HGB BLD-MCNC: 10.6 G/DL (ref 12–16)
IRON SATN MFR SERPL: 17 % (ref 15–50)
IRON SERPL-MCNC: 47 MCG/DL (ref 28–170)
LYMPHOCYTES # BLD: 2.2 K/UL (ref 1–4)
LYMPHOCYTES NFR BLD: 25 %
MCH RBC QN AUTO: 20.3 PG (ref 27–32)
MCHC RBC AUTO-ENTMCNC: 30.8 G/DL (ref 32–37)
MCV RBC AUTO: 65.9 FL (ref 80–100)
MONOCYTES # BLD: 0.5 K/UL (ref 0–1)
MONOCYTES NFR BLD: 6 %
NEUTROPHILS # BLD AUTO: 5.2 K/UL (ref 1.8–7.7)
NEUTROPHILS NFR BLD: 59 %
PLATELET # BLD AUTO: 354 K/UL (ref 140–400)
PMV BLD AUTO: 9.2 FL (ref 7.4–10.3)
RBC # BLD AUTO: 5.2 M/UL (ref 3.7–5.4)
TIBC SERPL-MCNC: 277 MCG/DL (ref 228–428)
TRANSFERRIN SERPL-MCNC: 210 MG/DL (ref 192–382)
WBC # BLD AUTO: 8.7 K/UL (ref 4–11)

## 2018-01-19 PROCEDURE — 99214 OFFICE O/P EST MOD 30 MIN: CPT | Performed by: INTERNAL MEDICINE

## 2018-01-19 PROCEDURE — 36415 COLL VENOUS BLD VENIPUNCTURE: CPT

## 2018-01-19 PROCEDURE — 85025 COMPLETE CBC W/AUTO DIFF WBC: CPT

## 2018-01-19 PROCEDURE — 83540 ASSAY OF IRON: CPT

## 2018-01-19 PROCEDURE — 99212 OFFICE O/P EST SF 10 MIN: CPT | Performed by: INTERNAL MEDICINE

## 2018-01-19 PROCEDURE — 84466 ASSAY OF TRANSFERRIN: CPT

## 2018-01-19 NOTE — PROGRESS NOTES
Cancer Center Progress Note    Patient Name: Satnam Hoang   YOB: 1985   Medical Record Number: [id number]   Attending Physician: Butch Solis M.D.        Chief Complaint:  Iron deficiency anemia, menorrhagia, history of provoked pulmonary em Comment: bowel obstruction  1986: OTHER SURGICAL HISTORY      Comment: Bowel obstruction-at birth  2011: OTHER SURGICAL HISTORY      Comment: Colon (?) mass removed  2011: OTHER SURGICAL HISTORY      Comment: Colon resection for bowel obstruction-one 74.8 kg (165 lb)   LMP 12/29/2017 (Exact Date)   BMI 24.72 kg/m²     Physical Examination:  General: Patient is alert and oriented x 3, not in acute distress. Psych:  Mood and affect appropriate  HEENT: EOMs intact. PERRL. Oropharynx is clear.    Neck: No microcytic  --Patient states her son has thalassemia. Given she remains microcytic we will check hemoglobin electrophoresis.   This was done 2 years ago however it was performed after a transfusion      2. pulmonary embolism-provoked now off anticoagulatio

## 2018-01-22 ENCOUNTER — OFFICE VISIT (OUTPATIENT)
Dept: ORTHOPEDICS CLINIC | Facility: CLINIC | Age: 33
End: 2018-01-22

## 2018-01-22 ENCOUNTER — TELEPHONE (OUTPATIENT)
Dept: ORTHOPEDICS CLINIC | Facility: CLINIC | Age: 33
End: 2018-01-22

## 2018-01-22 DIAGNOSIS — S83.272A COMPLEX TEAR OF LATERAL MENISCUS OF LEFT KNEE AS CURRENT INJURY, INITIAL ENCOUNTER: Primary | ICD-10-CM

## 2018-01-22 PROCEDURE — 99203 OFFICE O/P NEW LOW 30 MIN: CPT | Performed by: ORTHOPAEDIC SURGERY

## 2018-01-22 PROCEDURE — 99212 OFFICE O/P EST SF 10 MIN: CPT | Performed by: ORTHOPAEDIC SURGERY

## 2018-01-22 NOTE — H&P
Chief Complaint: Left knee pain    NURSING INTAKE COMMENTS: Patient presents with:  Knee Pain: Left - onset in September after she flew back from [facility] - no injury - has pain on the lateral and anterior aspect of the knee and while walking the knee gaves comprehensive 10 point review of systems was completed and reviewed from the patient intake forms and electronic medical record. Pertinent positives and negatives noted in the the HPI.      Physical Examination:  There is no height or weight on file to lalita

## 2018-01-22 NOTE — TELEPHONE ENCOUNTER
Called Melissa and s/w MsAda Skinner to initiate PA for MRI left knee. Gave clinicals per [facility] OV notes. MRI approved [id number] exp 3/8/18 at [address] [place] [address]. Called pt and informed her of MRI auth and exp date.  Gave her phone # to CS to make appt and advised he

## 2018-01-26 ENCOUNTER — OFFICE VISIT (OUTPATIENT)
Dept: OBGYN CLINIC | Facility: CLINIC | Age: 33
End: 2018-01-26

## 2018-01-26 VITALS
BODY MASS INDEX: 25 KG/M2 | DIASTOLIC BLOOD PRESSURE: 74 MMHG | HEART RATE: 80 BPM | WEIGHT: 165 LBS | SYSTOLIC BLOOD PRESSURE: 117 MMHG

## 2018-01-26 DIAGNOSIS — Z98.890 POST-OPERATIVE STATE: Primary | ICD-10-CM

## 2018-01-26 PROCEDURE — 99024 POSTOP FOLLOW-UP VISIT: CPT | Performed by: OBSTETRICS & GYNECOLOGY

## 2018-01-26 RX ORDER — PENICILLIN V POTASSIUM 500 MG/1
TABLET ORAL
Refills: 0 | COMMUNITY
Start: 2018-01-23 | End: 2018-02-21

## 2018-01-26 NOTE — PROGRESS NOTES
[facility], [facility]  Obstetrics and Gynecology  Focused Gynecology Problem Exam  Nilson Hernandez MD    Snow Mai is a 33year old female presenting for Follow - Up (Pos-Op. visit Total abdominal hysterectomy and bilateral salpingectomy )  .     HPI:   Armando Reyes obstruction  1985: OTHER SURGICAL HISTORY      Comment: Bowel obstruction-at birth  2011: OTHER SURGICAL HISTORY      Comment: Colon (?) mass removed  2011: OTHER SURGICAL HISTORY      Comment: Colon resection for bowel obstruction-one                month encounter.     PRESCRIPTIONS:     No prescriptions requested or ordered in this encounter     IMAGING/ REFERRALS:    None     Tae Cosme MD  1/26/2018  1:21 PM

## 2018-02-01 ENCOUNTER — HOSPITAL ENCOUNTER (OUTPATIENT)
Dept: MRI IMAGING | Facility: HOSPITAL | Age: 33
Discharge: HOME | End: 2018-02-01
Attending: ORTHOPAEDIC SURGERY
Payer: MEDICAID

## 2018-02-01 DIAGNOSIS — S83.272A COMPLEX TEAR OF LATERAL MENISCUS OF LEFT KNEE AS CURRENT INJURY, INITIAL ENCOUNTER: ICD-10-CM

## 2018-02-01 PROCEDURE — 73721 MRI JNT OF LWR EXTRE W/O DYE: CPT | Performed by: ORTHOPAEDIC SURGERY

## 2018-02-14 ENCOUNTER — OFFICE VISIT (OUTPATIENT)
Dept: ORTHOPEDICS CLINIC | Facility: CLINIC | Age: 33
End: 2018-02-14

## 2018-02-14 DIAGNOSIS — S83.282A ACUTE LATERAL MENISCUS TEAR OF LEFT KNEE, INITIAL ENCOUNTER: Primary | ICD-10-CM

## 2018-02-14 PROCEDURE — 99214 OFFICE O/P EST MOD 30 MIN: CPT | Performed by: ORTHOPAEDIC SURGERY

## 2018-02-14 PROCEDURE — 99212 OFFICE O/P EST SF 10 MIN: CPT | Performed by: ORTHOPAEDIC SURGERY

## 2018-02-16 ENCOUNTER — TELEPHONE (OUTPATIENT)
Dept: ORTHOPEDICS CLINIC | Facility: CLINIC | Age: 33
End: 2018-02-16

## 2018-02-16 NOTE — TELEPHONE ENCOUNTER
Patient needs work note stating her surgery is scheduled for 3/6/18 and will be off work from now until 3/12/18. [email or web address]  Please advise

## 2018-02-16 NOTE — TELEPHONE ENCOUNTER
[facility] Fab[facility]io [facility]urer , due to patients insurance.

## 2018-02-21 ENCOUNTER — OFFICE VISIT (OUTPATIENT)
Dept: OBGYN CLINIC | Facility: CLINIC | Age: 33
End: 2018-02-21

## 2018-02-21 VITALS
WEIGHT: 162.63 LBS | HEART RATE: 82 BPM | DIASTOLIC BLOOD PRESSURE: 86 MMHG | SYSTOLIC BLOOD PRESSURE: 133 MMHG | BODY MASS INDEX: 24 KG/M2

## 2018-02-21 DIAGNOSIS — Z98.890 POST-OPERATIVE STATE: Primary | ICD-10-CM

## 2018-02-21 PROCEDURE — 99024 POSTOP FOLLOW-UP VISIT: CPT | Performed by: OBSTETRICS & GYNECOLOGY

## 2018-02-21 NOTE — PROGRESS NOTES
[facility]  Obstetrics and Gynecology  Focused Gynecology Problem Exam  Moses Benavidez MD    Lea Avelar is a 33year old female presenting for Post-Op (total abdominal hysterectomy and bilateral salpingectomy done on 1/4/18. Denies any concerns. transfusion 2016   • Knee swelling    • Pulmonary embolism ([facility] [address] 2016       Past Surgical History:  No date: ANESTH, SECTION  No date:    and : COLECTOMY  No date: OTHER      Comment: bowel obstruction  : OTHER SURGICAL HISTORY no lesions or fissures                     Vagina: normal pink mucosa, no lesions, normal clear discharge. Vaginal cuff sutures are still visible of vaginal cuff feels intact and no evidence of defect.                      Adnexa: non tender, no masses, no

## 2018-02-22 ENCOUNTER — TELEPHONE (OUTPATIENT)
Dept: ORTHOPEDICS CLINIC | Facility: CLINIC | Age: 33
End: 2018-02-22

## 2018-02-22 NOTE — TELEPHONE ENCOUNTER
Surgery for 3-6-18 has been denied by [facility]. Does not show severe pain and loss of knee function.      FYI

## 2018-02-23 NOTE — TELEPHONE ENCOUNTER
Fina Starkey made aware that the surgery has been denied by [facility]. Discussed the appeals process and informed she will be called once appeals has been processed.  Verbalizes understanding  Paper documentation received about denial from [employer] [facility]

## 2018-02-26 NOTE — TELEPHONE ENCOUNTER
Patient surgery was denied by [employer]. Plan is to cancel surgery for March 6. She will continue with anti-inflammatory medications, rest, and home exercise program.  I will see her back in mid to late March to reevaluate her.   If

## 2018-03-01 ENCOUNTER — TELEPHONE (OUTPATIENT)
Dept: OBGYN CLINIC | Facility: CLINIC | Age: 33
End: 2018-03-01

## 2018-03-05 ENCOUNTER — TELEPHONE (OUTPATIENT)
Dept: OBGYN CLINIC | Facility: CLINIC | Age: 33
End: 2018-03-05

## 2018-03-05 NOTE — TELEPHONE ENCOUNTER
Per SANJU informed pt that forms were received and sent to [employer] dept. Pt made aware and also gave her [employer] dept's number so she could contact them regarding the $25 fee she needs to pay for the forms completion. Verbalized understanding.

## 2018-03-07 NOTE — TELEPHONE ENCOUNTER
Disability form received in [employer]. Logged for processing. [employer] packet emailed [email or web address]) to pt for addl $25 (2nd form) and new release.  NK

## 2018-03-09 NOTE — TELEPHONE ENCOUNTER
Dr. Anand Slider (2 forms)    Please sign off on form:  -Highlight the patient and hit \"Chart\" button. -In Chart Review, w/in the Encounter tab - click 1 time on the Telephone call encounter for 3-5-18.  Scroll down the telephone encounter.  -Click \"scan on\"

## 2018-04-11 ENCOUNTER — OFFICE VISIT (OUTPATIENT)
Dept: ORTHOPEDICS CLINIC | Facility: CLINIC | Age: 33
End: 2018-04-11

## 2018-04-11 DIAGNOSIS — S83.282A ACUTE LATERAL MENISCUS TEAR OF LEFT KNEE, INITIAL ENCOUNTER: Primary | ICD-10-CM

## 2018-04-11 DIAGNOSIS — S83.272A COMPLEX TEAR OF LATERAL MENISCUS OF LEFT KNEE AS CURRENT INJURY, INITIAL ENCOUNTER: ICD-10-CM

## 2018-04-11 PROCEDURE — 99212 OFFICE O/P EST SF 10 MIN: CPT | Performed by: ORTHOPAEDIC SURGERY

## 2018-04-11 PROCEDURE — 99214 OFFICE O/P EST MOD 30 MIN: CPT | Performed by: ORTHOPAEDIC SURGERY

## 2018-04-11 RX ORDER — ENOXAPARIN SODIUM 100 MG/ML
40 INJECTION SUBCUTANEOUS DAILY
Qty: 8.4 ML | Refills: 0 | Status: SHIPPED | OUTPATIENT
Start: 2018-04-11 | End: 2018-05-02

## 2018-04-11 NOTE — H&P
Chief Complaint: Left knee pain        History of present illness: This 33year old female comes in for repeat evaluation of the left knee. She continues to have pain along the lateral joint line.      It is getting worse over time and has not improved de perfusion, non-labored breathing, and a soft abdomen.     KNEE EXAM: LEFT      Range of Motion 0-150 Degrees     Effusion slight     Swelling None     Erythema None     Atrophy None             Strength          Quadriceps 5/5        Hamstrings 5/5         history. We will give her Lovenox postoperatively as that is what she has had it after other surgeries. All the patient's questions were answered. The patient expressed understanding of my explanations and plan of care. Informed consent was obtained.

## 2018-04-18 ENCOUNTER — TELEPHONE (OUTPATIENT)
Dept: ORTHOPEDICS CLINIC | Facility: CLINIC | Age: 33
End: 2018-04-18

## 2018-04-18 NOTE — TELEPHONE ENCOUNTER
Pt having ellie with Dr. Inocencia Mitchell on 5-3-18  [facility]  Out patient  [id number]    Left knee scope    S83.282A    [facility] plan medicaid    Processing thru managed care  Referral in system  Referral status : approved.

## 2018-04-23 RX ORDER — ALBUTEROL SULFATE 90 UG/1
1 AEROSOL, METERED RESPIRATORY (INHALATION) EVERY 6 HOURS PRN
Qty: 1 INHALER | Refills: 0 | Status: SHIPPED | OUTPATIENT
Start: 2018-04-23 | End: 2020-01-24

## 2018-04-23 NOTE — TELEPHONE ENCOUNTER
Pt is requesting refill on meds listed below   pt is out of pump         Current Outpatient Prescriptions:  Albuterol Sulfate  (90 Base) MCG/ACT Inhalation Aero Soln Inhale 1 puff into the lungs every 6 (six) hours as needed for Wheezing.  Disp:  Rfl

## 2018-04-23 NOTE — TELEPHONE ENCOUNTER
Please advise on refill request.     Refill Protocol Appointment Criteria  · Appointment scheduled in the past 6 months or in the next 3 months  Recent Outpatient Visits            1 week ago Acute lateral meniscus tear of left knee, initial encounter    E

## 2018-05-03 ENCOUNTER — ANESTHESIA EVENT (OUTPATIENT)
Dept: SURGERY | Facility: HOSPITAL | Age: 33
End: 2018-05-03
Payer: MEDICAID

## 2018-05-03 ENCOUNTER — SURGERY (OUTPATIENT)
Age: 33
End: 2018-05-03

## 2018-05-03 ENCOUNTER — HOSPITAL ENCOUNTER (OUTPATIENT)
Facility: HOSPITAL | Age: 33
Setting detail: HOSPITAL OUTPATIENT SURGERY
Discharge: HOME | End: 2018-05-03
Attending: ORTHOPAEDIC SURGERY | Admitting: ORTHOPAEDIC SURGERY
Payer: MEDICAID

## 2018-05-03 ENCOUNTER — ANESTHESIA (OUTPATIENT)
Dept: SURGERY | Facility: HOSPITAL | Age: 33
End: 2018-05-03
Payer: MEDICAID

## 2018-05-03 VITALS
HEIGHT: 68 IN | HEART RATE: 68 BPM | RESPIRATION RATE: 16 BRPM | OXYGEN SATURATION: 99 % | WEIGHT: 163 LBS | TEMPERATURE: 99 F | SYSTOLIC BLOOD PRESSURE: 174 MMHG | BODY MASS INDEX: 24.71 KG/M2 | DIASTOLIC BLOOD PRESSURE: 116 MMHG

## 2018-05-03 DIAGNOSIS — S83.282A ACUTE LATERAL MENISCUS TEAR OF LEFT KNEE, INITIAL ENCOUNTER: ICD-10-CM

## 2018-05-03 PROCEDURE — 0SBD4ZZ EXCISION OF LEFT KNEE JOINT, PERCUTANEOUS ENDOSCOPIC APPROACH: ICD-10-PCS | Performed by: ORTHOPAEDIC SURGERY

## 2018-05-03 RX ORDER — ENOXAPARIN SODIUM 100 MG/ML
40 INJECTION SUBCUTANEOUS DAILY
Qty: 14 SYRINGE | Refills: 0 | Status: SHIPPED | OUTPATIENT
Start: 2018-05-03 | End: 2018-05-17

## 2018-05-03 RX ORDER — HYDROCODONE BITARTRATE AND ACETAMINOPHEN 5; 325 MG/1; MG/1
1 TABLET ORAL AS NEEDED
Status: COMPLETED | OUTPATIENT
Start: 2018-05-03 | End: 2018-05-03

## 2018-05-03 RX ORDER — ASPIRIN 325 MG
325 TABLET ORAL DAILY
Qty: 14 TABLET | Refills: 0 | Status: SHIPPED | OUTPATIENT
Start: 2018-05-03 | End: 2018-05-03

## 2018-05-03 RX ORDER — FAMOTIDINE 20 MG/1
20 TABLET ORAL ONCE
Status: DISCONTINUED | OUTPATIENT
Start: 2018-05-03 | End: 2018-05-03 | Stop reason: HOSPADM

## 2018-05-03 RX ORDER — MIDAZOLAM HYDROCHLORIDE 1 MG/ML
INJECTION INTRAMUSCULAR; INTRAVENOUS AS NEEDED
Status: DISCONTINUED | OUTPATIENT
Start: 2018-05-03 | End: 2018-05-03 | Stop reason: SURG

## 2018-05-03 RX ORDER — HYDROMORPHONE HYDROCHLORIDE 1 MG/ML
0.4 INJECTION, SOLUTION INTRAMUSCULAR; INTRAVENOUS; SUBCUTANEOUS EVERY 5 MIN PRN
Status: DISCONTINUED | OUTPATIENT
Start: 2018-05-03 | End: 2018-05-03

## 2018-05-03 RX ORDER — ONDANSETRON 2 MG/ML
INJECTION INTRAMUSCULAR; INTRAVENOUS AS NEEDED
Status: DISCONTINUED | OUTPATIENT
Start: 2018-05-03 | End: 2018-05-03 | Stop reason: SURG

## 2018-05-03 RX ORDER — HYDROMORPHONE HYDROCHLORIDE 1 MG/ML
0.2 INJECTION, SOLUTION INTRAMUSCULAR; INTRAVENOUS; SUBCUTANEOUS EVERY 5 MIN PRN
Status: DISCONTINUED | OUTPATIENT
Start: 2018-05-03 | End: 2018-05-03

## 2018-05-03 RX ORDER — METOCLOPRAMIDE 10 MG/1
10 TABLET ORAL ONCE
Status: DISCONTINUED | OUTPATIENT
Start: 2018-05-03 | End: 2018-05-03 | Stop reason: HOSPADM

## 2018-05-03 RX ORDER — DIPHENHYDRAMINE HYDROCHLORIDE 50 MG/ML
25 INJECTION INTRAMUSCULAR; INTRAVENOUS ONCE
Status: COMPLETED | OUTPATIENT
Start: 2018-05-03 | End: 2018-05-03

## 2018-05-03 RX ORDER — GLYCOPYRROLATE 0.2 MG/ML
INJECTION, SOLUTION INTRAMUSCULAR; INTRAVENOUS AS NEEDED
Status: DISCONTINUED | OUTPATIENT
Start: 2018-05-03 | End: 2018-05-03 | Stop reason: SURG

## 2018-05-03 RX ORDER — NALOXONE HYDROCHLORIDE 0.4 MG/ML
80 INJECTION, SOLUTION INTRAMUSCULAR; INTRAVENOUS; SUBCUTANEOUS AS NEEDED
Status: DISCONTINUED | OUTPATIENT
Start: 2018-05-03 | End: 2018-05-03

## 2018-05-03 RX ORDER — LIDOCAINE HYDROCHLORIDE 10 MG/ML
INJECTION, SOLUTION EPIDURAL; INFILTRATION; INTRACAUDAL; PERINEURAL AS NEEDED
Status: DISCONTINUED | OUTPATIENT
Start: 2018-05-03 | End: 2018-05-03 | Stop reason: SURG

## 2018-05-03 RX ORDER — ONDANSETRON 2 MG/ML
4 INJECTION INTRAMUSCULAR; INTRAVENOUS ONCE AS NEEDED
Status: DISCONTINUED | OUTPATIENT
Start: 2018-05-03 | End: 2018-05-03

## 2018-05-03 RX ORDER — ACETAMINOPHEN 500 MG
1000 TABLET ORAL ONCE
Status: COMPLETED | OUTPATIENT
Start: 2018-05-03 | End: 2018-05-03

## 2018-05-03 RX ORDER — SODIUM CHLORIDE, SODIUM LACTATE, POTASSIUM CHLORIDE, CALCIUM CHLORIDE 600; 310; 30; 20 MG/100ML; MG/100ML; MG/100ML; MG/100ML
INJECTION, SOLUTION INTRAVENOUS CONTINUOUS
Status: DISCONTINUED | OUTPATIENT
Start: 2018-05-03 | End: 2018-05-03

## 2018-05-03 RX ORDER — DEXAMETHASONE SODIUM PHOSPHATE 4 MG/ML
VIAL (ML) INJECTION AS NEEDED
Status: DISCONTINUED | OUTPATIENT
Start: 2018-05-03 | End: 2018-05-03 | Stop reason: SURG

## 2018-05-03 RX ORDER — CEFAZOLIN SODIUM/WATER 2 G/20 ML
2 SYRINGE (ML) INTRAVENOUS ONCE
Status: COMPLETED | OUTPATIENT
Start: 2018-05-03 | End: 2018-05-03

## 2018-05-03 RX ORDER — ONDANSETRON 2 MG/ML
4 INJECTION INTRAMUSCULAR; INTRAVENOUS EVERY 6 HOURS PRN
Status: DISCONTINUED | OUTPATIENT
Start: 2018-05-03 | End: 2018-05-03

## 2018-05-03 RX ORDER — HYDROCODONE BITARTRATE AND ACETAMINOPHEN 5; 325 MG/1; MG/1
2 TABLET ORAL AS NEEDED
Status: COMPLETED | OUTPATIENT
Start: 2018-05-03 | End: 2018-05-03

## 2018-05-03 RX ORDER — BUPIVACAINE HYDROCHLORIDE AND EPINEPHRINE 5; 5 MG/ML; UG/ML
INJECTION, SOLUTION PERINEURAL AS NEEDED
Status: DISCONTINUED | OUTPATIENT
Start: 2018-05-03 | End: 2018-05-03

## 2018-05-03 RX ORDER — HYDROCODONE BITARTRATE AND ACETAMINOPHEN 5; 325 MG/1; MG/1
1-2 TABLET ORAL EVERY 4 HOURS PRN
Qty: 20 TABLET | Refills: 0 | Status: SHIPPED | OUTPATIENT
Start: 2018-05-03 | End: 2019-10-01

## 2018-05-03 RX ORDER — HYDROMORPHONE HYDROCHLORIDE 1 MG/ML
0.6 INJECTION, SOLUTION INTRAMUSCULAR; INTRAVENOUS; SUBCUTANEOUS EVERY 5 MIN PRN
Status: DISCONTINUED | OUTPATIENT
Start: 2018-05-03 | End: 2018-05-03

## 2018-05-03 RX ADMIN — ONDANSETRON 4 MG: 2 INJECTION INTRAMUSCULAR; INTRAVENOUS at 12:06:00

## 2018-05-03 RX ADMIN — CEFAZOLIN SODIUM/WATER 2 G: 2 G/20 ML SYRINGE (ML) INTRAVENOUS at 12:18:00

## 2018-05-03 RX ADMIN — MIDAZOLAM HYDROCHLORIDE 2 MG: 1 INJECTION INTRAMUSCULAR; INTRAVENOUS at 12:06:00

## 2018-05-03 RX ADMIN — LIDOCAINE HYDROCHLORIDE 50 MG: 10 INJECTION, SOLUTION EPIDURAL; INFILTRATION; INTRACAUDAL; PERINEURAL at 12:06:00

## 2018-05-03 RX ADMIN — DEXAMETHASONE SODIUM PHOSPHATE 4 MG: 4 MG/ML VIAL (ML) INJECTION at 12:06:00

## 2018-05-03 RX ADMIN — SODIUM CHLORIDE, SODIUM LACTATE, POTASSIUM CHLORIDE, CALCIUM CHLORIDE: 600; 310; 30; 20 INJECTION, SOLUTION INTRAVENOUS at 12:38:00

## 2018-05-03 RX ADMIN — SODIUM CHLORIDE, SODIUM LACTATE, POTASSIUM CHLORIDE, CALCIUM CHLORIDE: 600; 310; 30; 20 INJECTION, SOLUTION INTRAVENOUS at 12:01:00

## 2018-05-03 RX ADMIN — GLYCOPYRROLATE 0.2 MG: 0.2 INJECTION, SOLUTION INTRAMUSCULAR; INTRAVENOUS at 12:06:00

## 2018-05-03 NOTE — ANESTHESIA PREPROCEDURE EVALUATION
Anesthesia PreOp Note    HPI:     Coleen Irizarry is a 33year old female who presents for preoperative consultation requested by:  Juliana Rodriguez MD    Date of Surgery: 5/3/2018    Procedure(s):  KNEE ARTHROSCOPY  Indication: Acute lateral meniscus t tab 20 mg 20 mg Oral Once Lalitha Prudent, MD    Metoclopramide HCl (REGLAN) tab 10 mg 10 mg Oral Once Lalitha Prudent, MD    Midazolam HCl (VERSED) 2 MG/2ML injection  Intravenous PRN Elieser Mcguire CRNA 2 mg at 05/03/18 1206   fentaNYL Available pre-op labs reviewed. Vital Signs: Body mass index is 24.78 kg/m². height is 1.727 m (5' 8\") and weight is 73.9 kg (163 lb). Her oral temperature is 97.4 °F (36.3 °C) (abnormal).  Her blood pressure is 131/95 and her pulse is

## 2018-05-03 NOTE — ANESTHESIA POSTPROCEDURE EVALUATION
Patient: Ally Candelario    Procedure Summary     Date:  05/03/18 Room / Location:  [address] [place] [address] MAIN OR 12 / [address] [place] [address] MAIN OR    Anesthesia Start:  9710 Anesthesia Stop:      Procedure:  KNEE ARTHROSCOPY (Left Knee) Diagnosis:       Acute lateral meniscus tear of left

## 2018-05-03 NOTE — OR PREOP
Dr. Zenobia Bowman anesthesiologist made aware of patient PHASE II /116. Came to talk to patient and told her to follow with her primary doctor in regards to undiagnosed hypertension. Patient states she will call her primary tomorrow.  No further orders receive

## 2018-05-03 NOTE — INTERVAL H&P NOTE
Pre-op Diagnosis: Acute lateral meniscus tear of left knee, initial encounter [S83.282A]    The above referenced H&P was reviewed by Marya Chen MD on 5/3/2018, the patient was examined and no significant changes have occurred in the patient's cond

## 2018-05-03 NOTE — OPERATIVE REPORT
[facility]    PATIENT'S NAME: Jovita Patel   ATTENDING PHYSICIAN: Daksha Suarez MD   OPERATING PHYSICIAN: Daksha Suarez MD   PATIENT ACCOUNT#:   [id number]    LOCATION:  [facility] [address] PACU 9 [facility] 10  MEDICAL RECORD #:   [id number]       WILMER of the ACL near its femoral insertion. There was firm endpoint on dynamic Lachman and anterior drawer testing with the knee arthroscope in place to visualize this.   When comparing, after we completed the procedure, the left knee to the right knee, there w

## 2018-05-03 NOTE — BRIEF OP NOTE
Pre-Operative Diagnosis: Acute lateral meniscus tear of left knee, initial encounter [S83.282A]     Post-Operative Diagnosis: Acute lateral meniscus tear of left knee, initial encounter [S83.282A]   Partial acl tear     Procedure Performed:   Procedure(s):

## 2018-05-03 NOTE — INTERVAL H&P NOTE
Pre-op Diagnosis: Acute lateral meniscus tear of left knee, initial encounter [S83.282A]    The above referenced H&P was reviewed by Lu De La Torre MD on 5/3/2018, the patient was examined and no significant changes have occurred in the patient's cond

## 2018-05-04 ENCOUNTER — HOSPITAL ENCOUNTER (OUTPATIENT)
Dept: ULTRASOUND IMAGING | Facility: HOSPITAL | Age: 33
Discharge: HOME | End: 2018-05-04
Attending: ORTHOPAEDIC SURGERY
Payer: MEDICAID

## 2018-05-04 ENCOUNTER — TELEPHONE (OUTPATIENT)
Dept: ORTHOPEDICS CLINIC | Facility: CLINIC | Age: 33
End: 2018-05-04

## 2018-05-04 ENCOUNTER — NURSE TRIAGE (OUTPATIENT)
Dept: FAMILY MEDICINE CLINIC | Facility: CLINIC | Age: 33
End: 2018-05-04

## 2018-05-04 DIAGNOSIS — R60.9 SWELLING: Primary | ICD-10-CM

## 2018-05-04 DIAGNOSIS — R60.9 SWELLING: ICD-10-CM

## 2018-05-04 PROCEDURE — 93971 EXTREMITY STUDY: CPT | Performed by: ORTHOPAEDIC SURGERY

## 2018-05-04 NOTE — TELEPHONE ENCOUNTER
Call to doppler dept Make aware of stat order for doppler  Made aware that pt is trying to get transportation to the hospital      Call to [facility]    Given instructions on location of dept  She is still working on transportation  She may have to [employer]

## 2018-05-04 NOTE — TELEPHONE ENCOUNTER
Pt states that the dressing was changed and every thing came off . Including a yellow gauze. Pt instructed to put a band aide to the incison sites. Pt states she has swelling in the knee and noted in her foot. Pt instructed to keep leg elevated.  Instruc

## 2018-05-04 NOTE — TELEPHONE ENCOUNTER
Action Requested: Summary for Provider     []  Critical Lab, Recommendations Needed  [] Need Additional Advice  []   FYI    []   Need Orders  [] Need Medications Sent to Pharmacy  []  Other     SUMMARY: APPT CREATED, hypertension    Pt states she had a kne

## 2018-05-04 NOTE — TELEPHONE ENCOUNTER
Stat ultrasound requires authorization  Call to [facility] to representative [address]  Call reference [id number]    Clinical questions  Approved A [id number]

## 2018-05-04 NOTE — TELEPHONE ENCOUNTER
Because of her history, if she has any calf pain she should get an ultrasound to make sure she does not have a blood clot. Send her to get a stat ultrasound today.

## 2018-05-07 ENCOUNTER — OFFICE VISIT (OUTPATIENT)
Dept: FAMILY MEDICINE CLINIC | Facility: CLINIC | Age: 33
End: 2018-05-07

## 2018-05-07 VITALS
SYSTOLIC BLOOD PRESSURE: 143 MMHG | DIASTOLIC BLOOD PRESSURE: 88 MMHG | HEART RATE: 102 BPM | WEIGHT: 161 LBS | BODY MASS INDEX: 24 KG/M2 | TEMPERATURE: 98 F | RESPIRATION RATE: 16 BRPM

## 2018-05-07 DIAGNOSIS — R03.0 ELEVATED BLOOD PRESSURE READING: Primary | ICD-10-CM

## 2018-05-07 DIAGNOSIS — R00.2 HEART PALPITATIONS: ICD-10-CM

## 2018-05-07 DIAGNOSIS — R06.02 SHORTNESS OF BREATH: ICD-10-CM

## 2018-05-07 PROCEDURE — 99214 OFFICE O/P EST MOD 30 MIN: CPT | Performed by: FAMILY MEDICINE

## 2018-05-07 PROCEDURE — 99212 OFFICE O/P EST SF 10 MIN: CPT | Performed by: FAMILY MEDICINE

## 2018-05-07 NOTE — PROGRESS NOTES
HPI:  Jazzy Chery is a 33year old female who presents for follow-up blood pressure. Pt had surgery on 5/3/18 on left meniscus. Pt reports she was told during surgery she had high blood pressure.  (140s/110s) She has a history of PE, SOB, and palpitations.  Pt rhonchi        Assessment:/Plan:  Elevated blood pressure reading  (primary encounter diagnosis)    Improved today. Advised to monitor closely. Call if persistently above 140/90.   Heart palpitations  Shortness of breath    Refer to cardiology for evaluati

## 2018-05-08 ENCOUNTER — PATIENT MESSAGE (OUTPATIENT)
Dept: ORTHOPEDICS CLINIC | Facility: CLINIC | Age: 33
End: 2018-05-08

## 2018-05-08 NOTE — TELEPHONE ENCOUNTER
From: Justin Sheldon  To: Katie Sharma MD  Sent: 5/8/2018 1:22 PM CDT  Subject: Visit Follow-up Question    Is it ok for me to return to work? Can I have a letter from your giving me permission to return?

## 2018-05-09 ENCOUNTER — TELEPHONE (OUTPATIENT)
Dept: ORTHOPEDICS CLINIC | Facility: CLINIC | Age: 33
End: 2018-05-09

## 2018-05-09 NOTE — TELEPHONE ENCOUNTER
Wondering if she is getting conflicted  information. Ultrasound-stated   to stay leg  off as much as possible.   Sitting at desk mostly. Walking about once an hour short walk. Knee still painful and swelling. Using her crutches.

## 2018-05-09 NOTE — TELEPHONE ENCOUNTER
OK to give her a note stating that it is ok to return to work as of whenever the patient would like.

## 2018-05-09 NOTE — TELEPHONE ENCOUNTER
Pt called stating pt had surgery on 5-3-18. Pt has question regarding the pain and swelling. Pt requesting a call back.

## 2018-05-10 ENCOUNTER — TELEPHONE (OUTPATIENT)
Dept: ADMINISTRATIVE | Age: 33
End: 2018-05-10

## 2018-05-10 NOTE — TELEPHONE ENCOUNTER
Call to [facility]  Informed of Dr Ivone Bender being aware of instructions given  Pt does not need to use crutches if she feels comfortable enough    Aware that her work duties are at a desk.      Pt verbalizes underatanding

## 2018-05-10 NOTE — TELEPHONE ENCOUNTER
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 Logged for processing.  NK

## 2018-05-15 NOTE — TELEPHONE ENCOUNTER
Dr. Smith Press consecutive leave following surgery and intermittent leave for retro appts & MRI and retro physical therapy. Please sign off on form:  -Highlight the patient and hit \"Chart\" button.   -In Chart Review, w/in the Encounter tab -

## 2018-05-17 ENCOUNTER — PRIOR ORIGINAL RECORDS (OUTPATIENT)
Dept: OTHER | Age: 33
End: 2018-05-17

## 2018-05-17 ENCOUNTER — MYAURORA ACCOUNT LINK (OUTPATIENT)
Dept: OTHER | Age: 33
End: 2018-05-17

## 2018-05-17 ENCOUNTER — OFFICE VISIT (OUTPATIENT)
Dept: HEMATOLOGY/ONCOLOGY | Facility: HOSPITAL | Age: 33
End: 2018-05-17
Attending: INTERNAL MEDICINE
Payer: MEDICAID

## 2018-05-17 ENCOUNTER — OFFICE VISIT (OUTPATIENT)
Dept: ORTHOPEDICS CLINIC | Facility: CLINIC | Age: 33
End: 2018-05-17

## 2018-05-17 ENCOUNTER — LAB ENCOUNTER (OUTPATIENT)
Dept: LAB | Facility: HOSPITAL | Age: 33
End: 2018-05-17
Attending: INTERNAL MEDICINE
Payer: MEDICAID

## 2018-05-17 VITALS
HEIGHT: 68.5 IN | HEART RATE: 96 BPM | SYSTOLIC BLOOD PRESSURE: 127 MMHG | DIASTOLIC BLOOD PRESSURE: 75 MMHG | RESPIRATION RATE: 16 BRPM | BODY MASS INDEX: 23.82 KG/M2 | WEIGHT: 159 LBS | TEMPERATURE: 98 F

## 2018-05-17 DIAGNOSIS — D50.9 MICROCYTIC ANEMIA: ICD-10-CM

## 2018-05-17 DIAGNOSIS — N92.4 EXCESSIVE BLEEDING IN PREMENOPAUSAL PERIOD: ICD-10-CM

## 2018-05-17 DIAGNOSIS — S83.282A ACUTE LATERAL MENISCUS TEAR OF LEFT KNEE, INITIAL ENCOUNTER: ICD-10-CM

## 2018-05-17 DIAGNOSIS — D50.0 IRON DEFICIENCY ANEMIA DUE TO CHRONIC BLOOD LOSS: Primary | ICD-10-CM

## 2018-05-17 DIAGNOSIS — Z86.711 HISTORY OF PULMONARY EMBOLISM: ICD-10-CM

## 2018-05-17 DIAGNOSIS — S83.272A COMPLEX TEAR OF LATERAL MENISCUS OF LEFT KNEE AS CURRENT INJURY, INITIAL ENCOUNTER: Primary | ICD-10-CM

## 2018-05-17 PROCEDURE — 99024 POSTOP FOLLOW-UP VISIT: CPT | Performed by: ORTHOPAEDIC SURGERY

## 2018-05-17 PROCEDURE — 99212 OFFICE O/P EST SF 10 MIN: CPT | Performed by: ORTHOPAEDIC SURGERY

## 2018-05-17 PROCEDURE — 83540 ASSAY OF IRON: CPT

## 2018-05-17 PROCEDURE — 99214 OFFICE O/P EST MOD 30 MIN: CPT | Performed by: INTERNAL MEDICINE

## 2018-05-17 PROCEDURE — 83020 HEMOGLOBIN ELECTROPHORESIS: CPT

## 2018-05-17 PROCEDURE — 84466 ASSAY OF TRANSFERRIN: CPT

## 2018-05-17 PROCEDURE — 36415 COLL VENOUS BLD VENIPUNCTURE: CPT

## 2018-05-17 PROCEDURE — 85025 COMPLETE CBC W/AUTO DIFF WBC: CPT

## 2018-05-17 PROCEDURE — 83021 HEMOGLOBIN CHROMOTOGRAPHY: CPT

## 2018-05-17 RX ORDER — DIPHENHYDRAMINE HYDROCHLORIDE 50 MG/ML
50 INJECTION INTRAMUSCULAR; INTRAVENOUS ONCE
Status: CANCELLED | OUTPATIENT
Start: 2018-05-17

## 2018-05-17 NOTE — PROGRESS NOTES
NURSING INTAKE COMMENTS: Patient presents with:  Post-Op: Left knee arthroscopy on May 3rd. Pt is her for post op. Pain level 3. Limping with walking. More pain with placing more wt on it. Feels like knee will give out at times.        Patient presents 2 we

## 2018-05-17 NOTE — PROGRESS NOTES
Cancer Center Progress Note    Patient Name: Oren Grubbs   YOB: 1985   Medical Record Number: [id number]   Attending Physician: Evon Lockwood M.D.        Chief Complaint:  Iron deficiency anemia, menorrhagia, history of provoked pulmonary em OTHER      Comment: bowel obstruction  1986: OTHER SURGICAL HISTORY      Comment: Bowel obstruction-at birth  2011: OTHER SURGICAL HISTORY      Comment: Colon (?) mass removed  2011: OTHER SURGICAL HISTORY      Comment: Colon resection for bowel obstructio Temp 98.4 °F (36.9 °C) (Oral)   Resp 16   Ht 1.74 m (5' 8.5\")   Wt 72.1 kg (159 lb)   LMP 12/29/2017 (Exact Date)   BMI 23.82 kg/m²     Physical Examination:  General: Patient is alert and oriented x 3, not in acute distress.   Psych:  Mood and affect appr thalassemia. Given she remains microcytic we will check hemoglobin electrophoresis--this is pending.   This was done 2 years ago however it was performed after a transfusion      2. pulmonary embolism-provoked now off anticoagulation  --we suspect this was

## 2018-05-18 ENCOUNTER — APPOINTMENT (OUTPATIENT)
Dept: HEMATOLOGY/ONCOLOGY | Facility: HOSPITAL | Age: 33
End: 2018-05-18
Attending: INTERNAL MEDICINE
Payer: MEDICAID

## 2018-05-22 NOTE — TELEPHONE ENCOUNTER
Linnea faxed to HR 05/16, scanned, orig mailed to pt. Also emailed to pt secure [email or web address]; [email or web address]. Call completed.  NICOLE

## 2018-05-29 NOTE — TELEPHONE ENCOUNTER
Patient's HR called and paperwork misplaced. They wanted copy faxed to [facility] at [phone]. I left message for patient to get verbal release to fax forms as Hippa release only gives us permission to send to her. No response.  Copy of form mailed to herminio

## 2018-06-01 NOTE — TELEPHONE ENCOUNTER
Ania Hulltessa left  on 6/1/18 giving verbal permission to release forms to her HR. Forms faxed to [facility] on 6/1/18. Request complete.

## 2018-06-04 ENCOUNTER — OFFICE VISIT (OUTPATIENT)
Dept: HEMATOLOGY/ONCOLOGY | Facility: HOSPITAL | Age: 33
End: 2018-06-04
Attending: INTERNAL MEDICINE
Payer: MEDICAID

## 2018-06-04 VITALS
DIASTOLIC BLOOD PRESSURE: 76 MMHG | TEMPERATURE: 99 F | RESPIRATION RATE: 16 BRPM | HEART RATE: 82 BPM | SYSTOLIC BLOOD PRESSURE: 117 MMHG

## 2018-06-04 DIAGNOSIS — D50.0 IRON DEFICIENCY ANEMIA DUE TO CHRONIC BLOOD LOSS: Primary | ICD-10-CM

## 2018-06-04 PROCEDURE — A4216 STERILE WATER/SALINE, 10 ML: HCPCS

## 2018-06-04 PROCEDURE — 96365 THER/PROPH/DIAG IV INF INIT: CPT

## 2018-06-04 PROCEDURE — 96366 THER/PROPH/DIAG IV INF ADDON: CPT

## 2018-06-04 RX ORDER — DIPHENHYDRAMINE HYDROCHLORIDE 50 MG/ML
50 INJECTION INTRAMUSCULAR; INTRAVENOUS ONCE
Status: CANCELLED | OUTPATIENT
Start: 2018-06-04

## 2018-06-04 RX ORDER — SODIUM CHLORIDE 9 MG/ML
INJECTION, SOLUTION INTRAVENOUS
Status: DISCONTINUED
Start: 2018-06-04 | End: 2018-06-04

## 2018-06-04 RX ORDER — 0.9 % SODIUM CHLORIDE 0.9 %
VIAL (ML) INJECTION
Status: DISCONTINUED
Start: 2018-06-04 | End: 2018-06-04

## 2018-06-04 NOTE — PROGRESS NOTES
Patricia to infusion today for Venofer 400mg 1 of 3. She arrives alert and independent. She has had Iron Dextran in the past but not Venofer or oral Iron. She denies shortness of breath, chest pain, dizziness, light headedness or headache.  She is feeling fat

## 2018-06-07 ENCOUNTER — APPOINTMENT (OUTPATIENT)
Dept: PHYSICAL THERAPY | Age: 33
End: 2018-06-07
Attending: ORTHOPAEDIC SURGERY
Payer: MEDICAID

## 2018-06-08 ENCOUNTER — APPOINTMENT (OUTPATIENT)
Dept: PHYSICAL THERAPY | Age: 33
End: 2018-06-08
Attending: ORTHOPAEDIC SURGERY
Payer: MEDICAID

## 2018-06-11 ENCOUNTER — OFFICE VISIT (OUTPATIENT)
Dept: HEMATOLOGY/ONCOLOGY | Facility: HOSPITAL | Age: 33
End: 2018-06-11
Attending: INTERNAL MEDICINE
Payer: MEDICAID

## 2018-06-11 ENCOUNTER — APPOINTMENT (OUTPATIENT)
Dept: PHYSICAL THERAPY | Age: 33
End: 2018-06-11
Attending: ORTHOPAEDIC SURGERY
Payer: MEDICAID

## 2018-06-11 VITALS
DIASTOLIC BLOOD PRESSURE: 72 MMHG | SYSTOLIC BLOOD PRESSURE: 121 MMHG | HEART RATE: 92 BPM | RESPIRATION RATE: 16 BRPM | TEMPERATURE: 99 F

## 2018-06-11 DIAGNOSIS — D50.0 IRON DEFICIENCY ANEMIA DUE TO CHRONIC BLOOD LOSS: Primary | ICD-10-CM

## 2018-06-11 PROCEDURE — 99211 OFF/OP EST MAY X REQ PHY/QHP: CPT

## 2018-06-11 PROCEDURE — A4216 STERILE WATER/SALINE, 10 ML: HCPCS

## 2018-06-11 PROCEDURE — 96375 TX/PRO/DX INJ NEW DRUG ADDON: CPT

## 2018-06-11 PROCEDURE — 96365 THER/PROPH/DIAG IV INF INIT: CPT

## 2018-06-11 PROCEDURE — 96376 TX/PRO/DX INJ SAME DRUG ADON: CPT

## 2018-06-11 PROCEDURE — 96366 THER/PROPH/DIAG IV INF ADDON: CPT

## 2018-06-11 RX ORDER — DIPHENHYDRAMINE HYDROCHLORIDE 50 MG/ML
50 INJECTION INTRAMUSCULAR; INTRAVENOUS ONCE
Status: CANCELLED | OUTPATIENT
Start: 2018-06-11

## 2018-06-11 RX ORDER — DIPHENHYDRAMINE HYDROCHLORIDE 50 MG/ML
50 INJECTION INTRAMUSCULAR; INTRAVENOUS ONCE
Status: COMPLETED | OUTPATIENT
Start: 2018-06-11 | End: 2018-06-11

## 2018-06-11 RX ORDER — DIPHENHYDRAMINE HYDROCHLORIDE 50 MG/ML
INJECTION INTRAMUSCULAR; INTRAVENOUS
Status: COMPLETED
Start: 2018-06-11 | End: 2018-06-11

## 2018-06-11 RX ORDER — SODIUM CHLORIDE 9 MG/ML
INJECTION, SOLUTION INTRAVENOUS
Status: DISCONTINUED
Start: 2018-06-11 | End: 2018-06-11

## 2018-06-11 RX ORDER — 0.9 % SODIUM CHLORIDE 0.9 %
VIAL (ML) INJECTION
Status: DISCONTINUED
Start: 2018-06-11 | End: 2018-06-11

## 2018-06-11 RX ADMIN — DIPHENHYDRAMINE HYDROCHLORIDE 25 MG: 50 INJECTION INTRAMUSCULAR; INTRAVENOUS at 17:40:00

## 2018-06-11 NOTE — PROGRESS NOTES
Patient here for 2/3 Venofer 400mg IV. Patient had last infusion on 6/4/18. She stated that her legs started to feel weird on her car ride home and when she arrived home she had itching hives on her lower legs and feet.   She took Bendaryl not sure of the

## 2018-06-14 ENCOUNTER — APPOINTMENT (OUTPATIENT)
Dept: PHYSICAL THERAPY | Age: 33
End: 2018-06-14
Attending: ORTHOPAEDIC SURGERY
Payer: MEDICAID

## 2018-06-18 ENCOUNTER — APPOINTMENT (OUTPATIENT)
Dept: HEMATOLOGY/ONCOLOGY | Facility: HOSPITAL | Age: 33
End: 2018-06-18
Attending: INTERNAL MEDICINE
Payer: MEDICAID

## 2018-06-18 ENCOUNTER — APPOINTMENT (OUTPATIENT)
Dept: PHYSICAL THERAPY | Age: 33
End: 2018-06-18
Attending: ORTHOPAEDIC SURGERY
Payer: MEDICAID

## 2018-06-20 ENCOUNTER — TELEPHONE (OUTPATIENT)
Dept: ADMINISTRATIVE | Age: 33
End: 2018-06-20

## 2018-06-20 NOTE — TELEPHONE ENCOUNTER
Good morning Dr. Jae Craig    This patient's human resources department called my today and is questioning the disability paperwork. We completed the disability form with a return to work date of 2/22/18 as per your letter. Form was returned to patient.  Fredrica Fothergill

## 2018-06-21 ENCOUNTER — OFFICE VISIT (OUTPATIENT)
Dept: PHYSICAL THERAPY | Age: 33
End: 2018-06-21
Attending: ORTHOPAEDIC SURGERY
Payer: MEDICAID

## 2018-06-21 PROCEDURE — 97110 THERAPEUTIC EXERCISES: CPT | Performed by: PHYSICAL THERAPIST

## 2018-06-21 PROCEDURE — 97162 PT EVAL MOD COMPLEX 30 MIN: CPT | Performed by: PHYSICAL THERAPIST

## 2018-06-21 NOTE — PROGRESS NOTES
LOWER EXTREMITY EVALUATION:   Referring Physician: Dr. Surendra Ham  Date of Onset: 9/17/2017, surgery date May 4, 2018 Date of Service: 6/21/2018   Diagnosis: L lateral menisectomy  PATIENT SUMMARY:   Earleen Mcburney is a 33year old y/o female who present Gait: no grossly abnormal gait pattern    Sensation: intact  GIRTH:  L 39.5 cm sup patella, R 39.0    AROM:  Hip Knee Foot/Ankle   WNL Flexion: R 145; L 120  Extension: R 0/+4 L 0/+4    WNL              Flexibility:   Hip Flexor: NT  Hamstrings: R min re options and has agreed to actively participate in planning and for this course of care. Thank you for your referral. Please co-sign or sign and return this letter via fax as soon as possible to [phone].  If you have any questions, please contact me

## 2018-06-22 ENCOUNTER — APPOINTMENT (OUTPATIENT)
Dept: PHYSICAL THERAPY | Age: 33
End: 2018-06-22
Attending: ORTHOPAEDIC SURGERY
Payer: MEDICAID

## 2018-06-22 NOTE — TELEPHONE ENCOUNTER
I do not recall any conversation with the patient specifically referring to extending her leave beyond 6 weeks. Having said that, I cannot say for 161% certain of that conversation did not happen.   I am sure that I did not state to just white out the date

## 2018-06-22 NOTE — TELEPHONE ENCOUNTER
Noted. Dr. Mary Alice Banks can you write a letter stating that patient had Total abdominal Hysterectorm and bilateral salpingectomy and the standard recovery for that procedure is 8 weeks. I will submit that to her HR.     Thanks

## 2018-06-24 NOTE — TELEPHONE ENCOUNTER
Please write a note stating that the patient had a total abdominal hysterectomy, including the date of her procedure, and that the standard postoperative recovery is approximately 8 weeks.   This note will need to be provided to Colleen Jj who has been wo

## 2018-06-28 ENCOUNTER — APPOINTMENT (OUTPATIENT)
Dept: PHYSICAL THERAPY | Age: 33
End: 2018-06-28
Attending: ORTHOPAEDIC SURGERY
Payer: MEDICAID

## 2018-06-28 ENCOUNTER — OFFICE VISIT (OUTPATIENT)
Dept: PHYSICAL THERAPY | Age: 33
End: 2018-06-28
Attending: ORTHOPAEDIC SURGERY
Payer: MEDICAID

## 2018-06-28 PROCEDURE — 97110 THERAPEUTIC EXERCISES: CPT | Performed by: PHYSICAL THERAPIST

## 2018-06-28 NOTE — PROGRESS NOTES
Diagnosis: L Lateral menisectomy  Authorized # of Visits:  1/8         Next MD visit: none scheduled  Fall Risk: standard         Precautions: n/a           Medication Changes since last visit?: No  Subjective: Patient reports not too much pain at the knee

## 2018-08-27 ENCOUNTER — OFFICE VISIT (OUTPATIENT)
Dept: FAMILY MEDICINE CLINIC | Facility: CLINIC | Age: 33
End: 2018-08-27
Payer: MEDICAID

## 2018-08-27 VITALS
RESPIRATION RATE: 16 BRPM | OXYGEN SATURATION: 98 % | HEART RATE: 65 BPM | DIASTOLIC BLOOD PRESSURE: 70 MMHG | SYSTOLIC BLOOD PRESSURE: 114 MMHG | TEMPERATURE: 98 F

## 2018-08-27 DIAGNOSIS — N94.9 VAGINAL BURNING: Primary | ICD-10-CM

## 2018-08-27 LAB
APPEARANCE: CLEAR
BILIRUBIN: NEGATIVE
GLUCOSE (URINE DIPSTICK): NEGATIVE MG/DL
KETONES (URINE DIPSTICK): NEGATIVE MG/DL
MULTISTIX LOT#: ABNORMAL NUMERIC
NITRITE, URINE: NEGATIVE
OCCULT BLOOD: NEGATIVE
PH, URINE: 6 (ref 4.5–8)
PROTEIN (URINE DIPSTICK): NEGATIVE MG/DL
SPECIFIC GRAVITY: 1.02 (ref 1–1.03)
URINE-COLOR: YELLOW
UROBILINOGEN,SEMI-QN: 0.2 MG/DL (ref 0–1.9)

## 2018-08-27 PROCEDURE — 99213 OFFICE O/P EST LOW 20 MIN: CPT | Performed by: NURSE PRACTITIONER

## 2018-08-27 PROCEDURE — 81003 URINALYSIS AUTO W/O SCOPE: CPT | Performed by: NURSE PRACTITIONER

## 2018-08-27 PROCEDURE — 87086 URINE CULTURE/COLONY COUNT: CPT | Performed by: NURSE PRACTITIONER

## 2018-08-27 RX ORDER — ERYTHROMYCIN AND BENZOYL PEROXIDE 30; 50 MG/G; MG/G
1 GEL TOPICAL NIGHTLY
Qty: 45 G | Refills: 1 | Status: SHIPPED | OUTPATIENT
Start: 2018-08-27 | End: 2018-09-26

## 2018-08-27 RX ORDER — FLUCONAZOLE 150 MG/1
150 TABLET ORAL ONCE
Qty: 1 TABLET | Refills: 0 | Status: SHIPPED | OUTPATIENT
Start: 2018-08-27 | End: 2018-08-27

## 2018-08-27 RX ORDER — NITROFURANTOIN 25; 75 MG/1; MG/1
100 CAPSULE ORAL 2 TIMES DAILY
Qty: 10 CAPSULE | Refills: 0 | Status: SHIPPED | OUTPATIENT
Start: 2018-08-27 | End: 2018-09-01

## 2018-08-27 NOTE — PROGRESS NOTES
CHIEF COMPLAINT:   No chief complaint on file. HPI:   Svetlana Aguillon is a 33year old female who presents with symptoms of vaginal burning for 3 days. Denies urinary frequency and urgency. Symptoms have been progressing since onset.   Treatments tri hour(s))  -URINALYSIS, AUTO, W/O SCOPE   Collection Time: 08/27/18  9:00 AM   Result Value Ref Range   GLUCOSE (URINE DIPSTICK) Negative mg/dL   BILIRUBIN Negative Negative   KETONES (URINE DIPSTICK) Negative Negative mg/dL   SPECIFIC GRAVITY 1.020 1.005 -

## 2018-12-17 ENCOUNTER — OFFICE VISIT (OUTPATIENT)
Dept: FAMILY MEDICINE CLINIC | Facility: CLINIC | Age: 33
End: 2018-12-17
Payer: MEDICAID

## 2018-12-17 VITALS
DIASTOLIC BLOOD PRESSURE: 85 MMHG | WEIGHT: 158.63 LBS | TEMPERATURE: 99 F | HEIGHT: 68.5 IN | HEART RATE: 105 BPM | SYSTOLIC BLOOD PRESSURE: 135 MMHG | BODY MASS INDEX: 23.76 KG/M2

## 2018-12-17 DIAGNOSIS — Z00.00 ANNUAL PHYSICAL EXAM: Primary | ICD-10-CM

## 2018-12-17 PROCEDURE — 90686 IIV4 VACC NO PRSV 0.5 ML IM: CPT | Performed by: FAMILY MEDICINE

## 2018-12-17 PROCEDURE — 90471 IMMUNIZATION ADMIN: CPT | Performed by: FAMILY MEDICINE

## 2018-12-17 PROCEDURE — 99395 PREV VISIT EST AGE 18-39: CPT | Performed by: FAMILY MEDICINE

## 2018-12-18 NOTE — PROGRESS NOTES
HPI:    Latisha Hernandez is a 29year old female presents to clinic for annual physical exam.   Anemia - notes that for the past month she has been feeling fatigued with some increase in SOB. No other concerns. Normal appetite, balanced diet.  Normal B 1-2 tablets by mouth every 4 (four) hours as needed for Pain. Disp: 20 tablet Rfl: 0       Allergies:  No Known Allergies      ROS:   Review of Systems   Constitutional: Positive for fatigue. HENT: Negative. Eyes: Negative.     Respiratory: Positive fo W REFLEX TO FREE        T4, LIPID PANEL  - flu vaccine given  - advised follow up with Heme regarding anemia  - Reinforced healthy diet, lifestyle, and exercise.   - Dentist visits Q6 months advised  - Annual eye exams advised  - to follow up in 1 year or s

## 2018-12-29 ENCOUNTER — LAB ENCOUNTER (OUTPATIENT)
Dept: LAB | Facility: HOSPITAL | Age: 33
End: 2018-12-29
Attending: FAMILY MEDICINE
Payer: MEDICAID

## 2018-12-29 DIAGNOSIS — Z00.00 ANNUAL PHYSICAL EXAM: ICD-10-CM

## 2018-12-29 DIAGNOSIS — D50.0 IRON DEFICIENCY ANEMIA DUE TO CHRONIC BLOOD LOSS: ICD-10-CM

## 2018-12-29 PROCEDURE — 80053 COMPREHEN METABOLIC PANEL: CPT

## 2018-12-29 PROCEDURE — 36415 COLL VENOUS BLD VENIPUNCTURE: CPT

## 2018-12-29 PROCEDURE — 85025 COMPLETE CBC W/AUTO DIFF WBC: CPT

## 2018-12-29 PROCEDURE — 84443 ASSAY THYROID STIM HORMONE: CPT

## 2018-12-29 PROCEDURE — 84466 ASSAY OF TRANSFERRIN: CPT

## 2018-12-29 PROCEDURE — 80061 LIPID PANEL: CPT

## 2018-12-29 PROCEDURE — 83540 ASSAY OF IRON: CPT

## 2019-01-25 ENCOUNTER — APPOINTMENT (OUTPATIENT)
Dept: HEMATOLOGY/ONCOLOGY | Facility: HOSPITAL | Age: 34
End: 2019-01-25
Attending: INTERNAL MEDICINE
Payer: MEDICAID

## 2019-02-28 VITALS
RESPIRATION RATE: 18 BRPM | DIASTOLIC BLOOD PRESSURE: 76 MMHG | HEIGHT: 69 IN | HEART RATE: 69 BPM | SYSTOLIC BLOOD PRESSURE: 110 MMHG | WEIGHT: 169 LBS | BODY MASS INDEX: 25.03 KG/M2

## 2019-02-28 VITALS
BODY MASS INDEX: 23.55 KG/M2 | WEIGHT: 159 LBS | OXYGEN SATURATION: 98 % | SYSTOLIC BLOOD PRESSURE: 120 MMHG | HEIGHT: 69 IN | HEART RATE: 86 BPM | DIASTOLIC BLOOD PRESSURE: 68 MMHG

## 2019-03-01 VITALS
SYSTOLIC BLOOD PRESSURE: 110 MMHG | OXYGEN SATURATION: 98 % | WEIGHT: 163 LBS | HEART RATE: 89 BPM | DIASTOLIC BLOOD PRESSURE: 62 MMHG | BODY MASS INDEX: 24.14 KG/M2 | HEIGHT: 69 IN

## 2019-05-21 ENCOUNTER — TELEPHONE (OUTPATIENT)
Dept: HEMATOLOGY/ONCOLOGY | Facility: HOSPITAL | Age: 34
End: 2019-05-21

## 2019-05-22 ENCOUNTER — APPOINTMENT (OUTPATIENT)
Dept: HEMATOLOGY/ONCOLOGY | Facility: HOSPITAL | Age: 34
End: 2019-05-22
Payer: MEDICAID

## 2019-10-01 ENCOUNTER — LAB ENCOUNTER (OUTPATIENT)
Dept: LAB | Age: 34
End: 2019-10-01
Attending: FAMILY MEDICINE
Payer: MEDICAID

## 2019-10-01 ENCOUNTER — TELEPHONE (OUTPATIENT)
Dept: OTHER | Age: 34
End: 2019-10-01

## 2019-10-01 ENCOUNTER — OFFICE VISIT (OUTPATIENT)
Dept: FAMILY MEDICINE CLINIC | Facility: CLINIC | Age: 34
End: 2019-10-01
Payer: MEDICAID

## 2019-10-01 VITALS
HEART RATE: 85 BPM | BODY MASS INDEX: 26 KG/M2 | WEIGHT: 175 LBS | SYSTOLIC BLOOD PRESSURE: 117 MMHG | DIASTOLIC BLOOD PRESSURE: 77 MMHG | TEMPERATURE: 98 F | RESPIRATION RATE: 16 BRPM

## 2019-10-01 DIAGNOSIS — D50.9 IDA (IRON DEFICIENCY ANEMIA): ICD-10-CM

## 2019-10-01 DIAGNOSIS — R35.0 URINARY FREQUENCY: ICD-10-CM

## 2019-10-01 DIAGNOSIS — D50.8 OTHER IRON DEFICIENCY ANEMIA: ICD-10-CM

## 2019-10-01 DIAGNOSIS — R53.83 FATIGUE, UNSPECIFIED TYPE: ICD-10-CM

## 2019-10-01 DIAGNOSIS — N83.209 RUPTURED OVARIAN CYST: Primary | ICD-10-CM

## 2019-10-01 PROCEDURE — 83036 HEMOGLOBIN GLYCOSYLATED A1C: CPT

## 2019-10-01 PROCEDURE — 99214 OFFICE O/P EST MOD 30 MIN: CPT | Performed by: FAMILY MEDICINE

## 2019-10-01 PROCEDURE — 84466 ASSAY OF TRANSFERRIN: CPT

## 2019-10-01 PROCEDURE — 90471 IMMUNIZATION ADMIN: CPT | Performed by: FAMILY MEDICINE

## 2019-10-01 PROCEDURE — 85060 BLOOD SMEAR INTERPRETATION: CPT

## 2019-10-01 PROCEDURE — 87086 URINE CULTURE/COLONY COUNT: CPT

## 2019-10-01 PROCEDURE — 85025 COMPLETE CBC W/AUTO DIFF WBC: CPT

## 2019-10-01 PROCEDURE — 90686 IIV4 VACC NO PRSV 0.5 ML IM: CPT | Performed by: FAMILY MEDICINE

## 2019-10-01 PROCEDURE — 36415 COLL VENOUS BLD VENIPUNCTURE: CPT

## 2019-10-01 PROCEDURE — 83540 ASSAY OF IRON: CPT

## 2019-10-01 RX ORDER — ONDANSETRON 4 MG/1
4 TABLET, ORALLY DISINTEGRATING ORAL EVERY 8 HOURS PRN
Qty: 15 TABLET | Refills: 0 | Status: SHIPPED | OUTPATIENT
Start: 2019-10-01 | End: 2020-01-24

## 2019-10-01 RX ORDER — OXYCODONE HYDROCHLORIDE 5 MG/1
5 TABLET ORAL
COMMUNITY
Start: 2019-09-27 | End: 2020-01-24

## 2019-10-01 RX ORDER — ACETAMINOPHEN 325 MG/1
650 TABLET ORAL
COMMUNITY
Start: 2019-09-27 | End: 2020-01-24

## 2019-10-01 RX ORDER — IBUPROFEN 600 MG/1
600 TABLET ORAL
COMMUNITY
Start: 2019-09-27 | End: 2020-01-24

## 2019-10-01 NOTE — TELEPHONE ENCOUNTER
Pt stated she was [facility] c/c ruptured cyst on ovary, feels nauseated, h/a, taking pain meds only at night- was advised to f/u with PCP-appt made

## 2019-10-02 NOTE — PROGRESS NOTES
HPI:    Nathan Matute is a 36year old female presents to clinic for hospital follow-up. Patient had severe pelvic pain, was told that she had an ovarian cyst rupture. Was hospitalized for pain management.   Reports that pain has improved, still has n Yes      Comment: occ    Drug use: No       Medications (Active prior to today's visit):    Current Outpatient Medications:  oxyCODONE HCl 5 MG Oral Tab Take 5 mg by mouth. Disp:  Rfl:    ibuprofen 600 MG Oral Tab Take 600 mg by mouth.  Disp:  Rfl:    aceta during her hospital stay. Referred to hematology for further management. Responsible party/patient verbalized understanding of information discussed. No barriers to learning observed.            Orders This Visit:  Orders Placed This Encounter      Hemo

## 2019-10-03 ENCOUNTER — OFFICE VISIT (OUTPATIENT)
Dept: OBGYN CLINIC | Facility: CLINIC | Age: 34
End: 2019-10-03
Payer: MEDICAID

## 2019-10-03 VITALS
WEIGHT: 174 LBS | SYSTOLIC BLOOD PRESSURE: 135 MMHG | BODY MASS INDEX: 26 KG/M2 | HEART RATE: 85 BPM | DIASTOLIC BLOOD PRESSURE: 82 MMHG

## 2019-10-03 DIAGNOSIS — N83.209 RUPTURED OVARIAN CYST: Primary | ICD-10-CM

## 2019-10-03 PROCEDURE — 99243 OFF/OP CNSLTJ NEW/EST LOW 30: CPT | Performed by: OBSTETRICS & GYNECOLOGY

## 2019-10-04 ENCOUNTER — TELEPHONE (OUTPATIENT)
Dept: FAMILY MEDICINE CLINIC | Facility: CLINIC | Age: 34
End: 2019-10-04

## 2019-10-04 ENCOUNTER — OFFICE VISIT (OUTPATIENT)
Dept: HEMATOLOGY/ONCOLOGY | Facility: HOSPITAL | Age: 34
End: 2019-10-04
Attending: INTERNAL MEDICINE
Payer: MEDICAID

## 2019-10-04 VITALS
TEMPERATURE: 98 F | DIASTOLIC BLOOD PRESSURE: 79 MMHG | SYSTOLIC BLOOD PRESSURE: 127 MMHG | HEART RATE: 79 BPM | BODY MASS INDEX: 25.69 KG/M2 | RESPIRATION RATE: 18 BRPM | OXYGEN SATURATION: 100 % | HEIGHT: 68.5 IN | WEIGHT: 171.5 LBS

## 2019-10-04 DIAGNOSIS — D50.0 IRON DEFICIENCY ANEMIA DUE TO CHRONIC BLOOD LOSS: Primary | ICD-10-CM

## 2019-10-04 DIAGNOSIS — N92.4 EXCESSIVE BLEEDING IN PREMENOPAUSAL PERIOD: ICD-10-CM

## 2019-10-04 DIAGNOSIS — Z86.711 HISTORY OF PULMONARY EMBOLISM: ICD-10-CM

## 2019-10-04 PROCEDURE — 99214 OFFICE O/P EST MOD 30 MIN: CPT | Performed by: INTERNAL MEDICINE

## 2019-10-04 NOTE — PROGRESS NOTES
Cancer Center Progress Note    Patient Name: Svetlana Aguillon   YOB: 1985   Medical Record Number: [id number]   Attending Physician: Jorge Lakhani M.D.        Chief Complaint:  Iron deficiency anemia, menorrhagia, history of provoked pulmonary em Performed by Yeimi Shah MD at [facility] OR   • [facility]     •      • COLECTOMY   and    • KNEE ARTHROSCOPY Left 5/3/2018    Performed by Katie Sharma MD at [facility] OR   • OTHER      bowel obstruction   • OTHE Intimate partner violence:        Fear of current or ex partner: Not on file        Emotionally abused: Not on file        Physically abused: Not on file        Forced sexual activity: Not on file    Other Topics      Concerns:        Not on file    Soci 8.7 (L) 12/29/2018    CREATSERUM 0.69 12/29/2018    ANIONGAP 8 12/29/2018    GFRNAA >60 12/29/2018    GFRAA >60 12/29/2018    CA 9.1 12/29/2018    OSMOCALC 284 12/29/2018    ALKPHO 38 12/29/2018    AST 11 (L) 12/29/2018    ALT 10 (L) 12/29/2018    BILT 0.5

## 2019-10-04 NOTE — PROGRESS NOTES
[facility]  Obstetrics and Gynecology  Focused Gynecology Problem Exam  Lm Perry MD    Chyna Hinton is a 36year old female presenting for Follow - Up (After Left ovary ruptured, Pt was referred by her PCP)  .     HPI:   Patient presents with ARTHROSCOPY Left 5/3/2018    Performed by Jazmín Arnett MD at [address] [facility] MAIN OR   • OTHER      bowel obstruction   • OTHER SURGICAL HISTORY  1985    Bowel obstruction-at birth   • OTHER SURGICAL HISTORY  2011    Colon (?) mass removed   • OTHER SURGICAL H Forced sexual activity: Not on file    Other Topics      Concerns:        Not on file    Social History Narrative      Not on file      ROS:   No dysuria, hematuria or urinary problems  Pt denies N/V, diarrhea or constipation.   PHYSICAL EXAM:   /82

## 2019-10-07 ENCOUNTER — TELEPHONE (OUTPATIENT)
Dept: GASTROENTEROLOGY | Facility: CLINIC | Age: 34
End: 2019-10-07

## 2019-10-07 NOTE — TELEPHONE ENCOUNTER
Noted, thank you Martin Mayen.      Future Appointments   Date Time Provider Razia Irizarry   10/24/2019 10:00 AM Loni Mtz MD [facility]

## 2019-10-07 NOTE — TELEPHONE ENCOUNTER
LMTCB- CSS pt must confirm consult appt with Dr. Reza Rojas at [address] [place] [address] on 10/24/2019 at [facility]note 3[facility] 51, must arrive 15 mins earlier. Please confirm appt to pt and PRAMOD JUAREZ RN's, thank you!

## 2019-10-07 NOTE — TELEPHONE ENCOUNTER
Dr. Irene Roberson notified me about this patient with JUAQUIN, that needs bidirectional endoscopy.     GI Clinical Staff:     Can you please call patient, I would like her to be seen on Thu 10/24 @ [facility] 51. Thx

## 2019-10-07 NOTE — TELEPHONE ENCOUNTER
Pt called to confirm appt for 10/24/19 in [facility] at 10am/9:45am arrival time.

## 2019-10-11 ENCOUNTER — TELEPHONE (OUTPATIENT)
Dept: HEMATOLOGY/ONCOLOGY | Facility: HOSPITAL | Age: 34
End: 2019-10-11

## 2019-10-18 ENCOUNTER — OFFICE VISIT (OUTPATIENT)
Dept: HEMATOLOGY/ONCOLOGY | Facility: HOSPITAL | Age: 34
End: 2019-10-18
Attending: INTERNAL MEDICINE
Payer: MEDICAID

## 2019-10-18 VITALS
SYSTOLIC BLOOD PRESSURE: 125 MMHG | OXYGEN SATURATION: 100 % | DIASTOLIC BLOOD PRESSURE: 84 MMHG | RESPIRATION RATE: 16 BRPM | TEMPERATURE: 98 F | HEART RATE: 91 BPM

## 2019-10-18 DIAGNOSIS — D50.0 IRON DEFICIENCY ANEMIA DUE TO CHRONIC BLOOD LOSS: Primary | ICD-10-CM

## 2019-10-18 PROCEDURE — 96365 THER/PROPH/DIAG IV INF INIT: CPT

## 2019-10-18 PROCEDURE — 96376 TX/PRO/DX INJ SAME DRUG ADON: CPT

## 2019-10-18 NOTE — PATIENT INSTRUCTIONS
Have your labs checked 1-2 days prior to your appointment with Dr. Zoë Medeiros at any [facility] outpatient lab center. Orders are already entered in the system.

## 2019-10-18 NOTE — PROGRESS NOTES
Vinnie Zavala arrived ambulating independently with friend with Iron dextran. Patient reports feeling fatigued, cold, and short of breath with exertion. She admits to having mised a follow up with Dr. Alecia Solares and can tell she is due for iron.  Patient was noted with

## 2019-10-24 ENCOUNTER — OFFICE VISIT (OUTPATIENT)
Dept: GASTROENTEROLOGY | Facility: CLINIC | Age: 34
End: 2019-10-24
Payer: MEDICAID

## 2019-10-24 ENCOUNTER — TELEPHONE (OUTPATIENT)
Dept: GASTROENTEROLOGY | Facility: CLINIC | Age: 34
End: 2019-10-24

## 2019-10-24 VITALS
HEART RATE: 73 BPM | SYSTOLIC BLOOD PRESSURE: 105 MMHG | DIASTOLIC BLOOD PRESSURE: 72 MMHG | BODY MASS INDEX: 25.62 KG/M2 | HEIGHT: 68.5 IN | WEIGHT: 171 LBS

## 2019-10-24 DIAGNOSIS — D64.9 SEVERE ANEMIA: Primary | ICD-10-CM

## 2019-10-24 DIAGNOSIS — D50.9 IRON DEFICIENCY ANEMIA, UNSPECIFIED IRON DEFICIENCY ANEMIA TYPE: Primary | ICD-10-CM

## 2019-10-24 DIAGNOSIS — D50.0 IRON DEFICIENCY ANEMIA DUE TO CHRONIC BLOOD LOSS: ICD-10-CM

## 2019-10-24 PROCEDURE — 99214 OFFICE O/P EST MOD 30 MIN: CPT | Performed by: INTERNAL MEDICINE

## 2019-10-24 RX ORDER — MULTIVIT WITH CALCIUM,IRON,MIN
1 TABLET ORAL DAILY
COMMUNITY
End: 2020-01-24

## 2019-10-24 NOTE — TELEPHONE ENCOUNTER
Scheduled for: EGD 07615   Provider Name: Dr Robert Barry  Date: Wed 10/30/19  Location: [facility]   Sedation: MAC  Time: 12:30 pm   Prep: Nothing after midnight the day before procedure and NPO 4 hrs prior procedure  Meds/Allergies Reconciled?: NKDA  Diagnosis with co

## 2019-10-24 NOTE — PATIENT INSTRUCTIONS
1. Schedule upper endoscopy with MAC (iron deficiency anemia)  2.  We will plan for patency pill and video capsule endoscopy if your stomach looks normal

## 2019-10-24 NOTE — H&P
[address] [facility] [address] Gastroenterology                                                                                                               Reason for consult: I 5/3/2018    Performed by Juliana Rodriguez MD at [address] [facility] MAIN OR   • OTHER      bowel obstruction   • OTHER SURGICAL HISTORY  1986    Bowel obstruction-at birth   • OTHER SURGICAL HISTORY  2011    Colon (?) mass removed   • OTHER SURGICAL HISTORY  2011    C cold intolerance and heat intolerance  MUSCULOSKELETAL:  negative for joint effusion/severe erythema  BEHAVIOR/PSYCH:  negative for psychotic behavior      PHYSICAL EXAM:   Blood pressure 105/72, pulse 73, height 5' 8.5\" (1.74 m), weight 171 lb (77.6 kg), small bowel obstruction and partial resection, increasing her risk of capsule retention. Therefore for upper endoscopy normal we will start with a patency capsule before proceeding with a video capsule.     Recommendations:  -1. Schedule upper endoscopy wi

## 2019-10-30 ENCOUNTER — ANESTHESIA (OUTPATIENT)
Dept: ENDOSCOPY | Facility: HOSPITAL | Age: 34
End: 2019-10-30
Payer: MEDICAID

## 2019-10-30 ENCOUNTER — ANESTHESIA EVENT (OUTPATIENT)
Dept: ENDOSCOPY | Facility: HOSPITAL | Age: 34
End: 2019-10-30
Payer: MEDICAID

## 2019-10-30 ENCOUNTER — HOSPITAL ENCOUNTER (OUTPATIENT)
Facility: HOSPITAL | Age: 34
Setting detail: HOSPITAL OUTPATIENT SURGERY
Discharge: HOME | End: 2019-10-30
Attending: INTERNAL MEDICINE | Admitting: INTERNAL MEDICINE
Payer: MEDICAID

## 2019-10-30 VITALS
RESPIRATION RATE: 13 BRPM | SYSTOLIC BLOOD PRESSURE: 134 MMHG | OXYGEN SATURATION: 100 % | BODY MASS INDEX: 25.18 KG/M2 | HEART RATE: 78 BPM | DIASTOLIC BLOOD PRESSURE: 85 MMHG | HEIGHT: 69 IN | WEIGHT: 170 LBS

## 2019-10-30 DIAGNOSIS — D50.9 IRON DEFICIENCY ANEMIA, UNSPECIFIED IRON DEFICIENCY ANEMIA TYPE: ICD-10-CM

## 2019-10-30 PROCEDURE — 0DB68ZX EXCISION OF STOMACH, VIA NATURAL OR ARTIFICIAL OPENING ENDOSCOPIC, DIAGNOSTIC: ICD-10-PCS | Performed by: INTERNAL MEDICINE

## 2019-10-30 PROCEDURE — 43239 EGD BIOPSY SINGLE/MULTIPLE: CPT | Performed by: INTERNAL MEDICINE

## 2019-10-30 PROCEDURE — 0DB98ZX EXCISION OF DUODENUM, VIA NATURAL OR ARTIFICIAL OPENING ENDOSCOPIC, DIAGNOSTIC: ICD-10-PCS | Performed by: INTERNAL MEDICINE

## 2019-10-30 RX ORDER — NALOXONE HYDROCHLORIDE 0.4 MG/ML
80 INJECTION, SOLUTION INTRAMUSCULAR; INTRAVENOUS; SUBCUTANEOUS AS NEEDED
Status: DISCONTINUED | OUTPATIENT
Start: 2019-10-30 | End: 2019-10-30

## 2019-10-30 RX ORDER — SODIUM CHLORIDE, SODIUM LACTATE, POTASSIUM CHLORIDE, CALCIUM CHLORIDE 600; 310; 30; 20 MG/100ML; MG/100ML; MG/100ML; MG/100ML
INJECTION, SOLUTION INTRAVENOUS CONTINUOUS
Status: DISCONTINUED | OUTPATIENT
Start: 2019-10-30 | End: 2019-10-30

## 2019-10-30 RX ORDER — ONDANSETRON 2 MG/ML
4 INJECTION INTRAMUSCULAR; INTRAVENOUS ONCE AS NEEDED
Status: DISCONTINUED | OUTPATIENT
Start: 2019-10-30 | End: 2019-10-30

## 2019-10-30 RX ORDER — LIDOCAINE HYDROCHLORIDE 10 MG/ML
INJECTION, SOLUTION EPIDURAL; INFILTRATION; INTRACAUDAL; PERINEURAL AS NEEDED
Status: DISCONTINUED | OUTPATIENT
Start: 2019-10-30 | End: 2019-10-30 | Stop reason: SURG

## 2019-10-30 RX ADMIN — LIDOCAINE HYDROCHLORIDE 60 MG: 10 INJECTION, SOLUTION EPIDURAL; INFILTRATION; INTRACAUDAL; PERINEURAL at 13:08:00

## 2019-10-30 RX ADMIN — SODIUM CHLORIDE, SODIUM LACTATE, POTASSIUM CHLORIDE, CALCIUM CHLORIDE: 600; 310; 30; 20 INJECTION, SOLUTION INTRAVENOUS at 13:19:00

## 2019-10-30 NOTE — OPERATIVE REPORT
ESOPHAGOGASTRODUODENOSCOPY (EGD) REPORT    Ben Guerin    DOMINGUEZ 1985 Age 36year old   PCP Lance Park MD Endoscopist Damian Antunez MD     Date of procedure: 10/30/19    Procedure: EGD w/cold biopsy    Pre-operative diagnosis: Iron deficie pathology. 2. If biopsies are normal, then proceed with video capsule endoscopy.  Of note, colonoscopy was already done in 2016 without any significant findings.     >>>If tissue was sampled/removed and you have not received your pathology results either b

## 2019-10-30 NOTE — H&P
History & Physical Examination    Patient Name: Hieu Duran  MRN: [id number]  [facility]: [id number]  YOB: 1985    Diagnosis: iron deficiency anemia    Multiple Vitamins-Minerals (MULTIPLE VITAMINS/WOMENS) Oral Tab, Take 1 tablet by mouth daily. , Grandfather    • Hypertension Paternal Grandfather      Social History    Tobacco Use      Smoking status: Never Smoker      Smokeless tobacco: Never Used    Alcohol use: Yes      Comment: occ        SYSTEM Check if Review is Normal Check if Physical Exam

## 2019-10-30 NOTE — ANESTHESIA POSTPROCEDURE EVALUATION
Patient: Honey Downs    Procedure Summary     Date:  10/30/19 Room / Location:  [facility] ENDOSCOPY 03 / [facility] ENDOSCOPY    Anesthesia Start:  0167 Anesthesia Stop:  8121    Procedure:  ESOPHAGOGASTRODUODENOSCOPY (EGD) (N/A ) Diagnosis:       Iron deficiency a

## 2019-10-30 NOTE — ANESTHESIA PREPROCEDURE EVALUATION
Anesthesia PreOp Note    HPI:     Ally Candealrio is a 36year old female who presents for preoperative consultation requested by: Asuncion Ureña MD    Date of Surgery: 10/30/2019    Procedure(s):  ESOPHAGOGASTRODUODENOSCOPY (EGD)  Indication: Iron defic Inhalation Aero Soln, Inhale 1 puff into the lungs every 6 (six) hours as needed for Wheezing., Disp: 1 Inhaler, Rfl: 0, 10/23/2019  oxyCODONE HCl 5 MG Oral Tab, Take 5 mg by mouth., Disp: , Rfl: , Not Taking  ibuprofen 600 MG Oral Tab, Take 600 mg by mout service: Not on file        Active member of club or organization: Not on file        Attends meetings of clubs or organizations: Not on file        Relationship status: Not on file      Intimate partner violence:        Fear of current or ex partner: Not including possible dental damage if relevant, major complications, and any alternative forms of anesthetic management. All of the patient's questions were answered to the best of my ability. The patient desires the anesthetic management as planned.   Rolando Jauregui

## 2019-11-12 ENCOUNTER — TELEPHONE (OUTPATIENT)
Dept: GASTROENTEROLOGY | Facility: CLINIC | Age: 34
End: 2019-11-12

## 2019-11-12 DIAGNOSIS — D50.9 IRON DEFICIENCY ANEMIA, UNSPECIFIED IRON DEFICIENCY ANEMIA TYPE: Primary | ICD-10-CM

## 2019-11-12 NOTE — TELEPHONE ENCOUNTER
GI Clinical Staff:   Please call patient to set up Video capsule, dx: iron def anemia. Prep with mag citrate protocol.

## 2019-11-19 NOTE — TELEPHONE ENCOUNTER
Scheduled for:  Video Capsule Endoscopy - 01015  Provider Name:  Dr. Dolores Puente  Date:  11/25/19  Location:  [facility]  Sedation:  NONE  Time:  7:30 am (pt is aware to arrive at 7:00 am, per Aubrie in Endo)  Prep:  NPO & Mag Citrate, Prep instructions were given to

## 2019-11-25 NOTE — TELEPHONE ENCOUNTER
Spoke to [place] in Endoscopy ([id number]) and wanted to inform Dr. Roslyn Estrada that pt was a \"no show\" for the VCE scheduled today. Routed to GI schedulers- please reschedule pt as per Dr. Roslyn Estrada orders below, thank you.

## 2019-11-25 NOTE — TELEPHONE ENCOUNTER
CBLM to schedule procedure. Please transfer to Mallie Ser at ext [phone] or [phone] for scheduling.

## 2019-11-26 NOTE — TELEPHONE ENCOUNTER
CBLM to schedule procedure. Please transfer to Araceli Dumont at ext [phone] or [phone] for scheduling. Ext [phone] on 11/26/19 only!

## 2019-12-05 ENCOUNTER — TELEPHONE (OUTPATIENT)
Dept: HEMATOLOGY/ONCOLOGY | Facility: HOSPITAL | Age: 34
End: 2019-12-05

## 2019-12-06 ENCOUNTER — APPOINTMENT (OUTPATIENT)
Dept: HEMATOLOGY/ONCOLOGY | Facility: HOSPITAL | Age: 34
End: 2019-12-06
Attending: INTERNAL MEDICINE
Payer: COMMERCIAL

## 2020-01-08 NOTE — TELEPHONE ENCOUNTER
Scheduled for:  Video Capsule Endoscopy - 79264  Provider Name:  Dr. Javier Fitzgerald  Date:  1/17/20  Location:  [facility]  Sedation:  NONE  Time:  7:30 am (pt is aware to arrive at 7:00 am, ok per Duane Hughes in Endo)  Prep:  NPO & Mag Citrate, Prep instructions were given to pt

## 2020-01-08 NOTE — TELEPHONE ENCOUNTER
Dr. Waymon Quick - Lattie Blizzard!    CB, unable to LM to schedule procedure. Please transfer to Breann Joel at ext [phone] or [phone] for scheduling. This is the third attempt to reach this pt to schedule with no success. Pt was a \"no show\" for original procedure.     Letter s

## 2020-01-15 ENCOUNTER — TELEPHONE (OUTPATIENT)
Dept: HEMATOLOGY/ONCOLOGY | Facility: HOSPITAL | Age: 35
End: 2020-01-15

## 2020-01-15 NOTE — TELEPHONE ENCOUNTER
LMTCB if needed, ok to have labs done tomorrow or same day on 1/17, at least 1 hour prior to Caleb Knox MD appt.

## 2020-01-15 NOTE — TELEPHONE ENCOUNTER
Theo Silva called to speak with a nurse to see if she Dr Norm Rubio will need the results from the procedure that is being done the morning of her appointment as well as a few questions about blood work.

## 2020-01-17 ENCOUNTER — OFFICE VISIT (OUTPATIENT)
Dept: HEMATOLOGY/ONCOLOGY | Facility: HOSPITAL | Age: 35
End: 2020-01-17
Attending: INTERNAL MEDICINE
Payer: COMMERCIAL

## 2020-01-17 ENCOUNTER — LAB ENCOUNTER (OUTPATIENT)
Dept: LAB | Facility: HOSPITAL | Age: 35
End: 2020-01-17
Attending: INTERNAL MEDICINE
Payer: COMMERCIAL

## 2020-01-17 ENCOUNTER — HOSPITAL ENCOUNTER (OUTPATIENT)
Facility: HOSPITAL | Age: 35
Setting detail: HOSPITAL OUTPATIENT SURGERY
Discharge: HOME | End: 2020-01-17
Attending: INTERNAL MEDICINE | Admitting: INTERNAL MEDICINE
Payer: COMMERCIAL

## 2020-01-17 VITALS
RESPIRATION RATE: 16 BRPM | HEIGHT: 68 IN | BODY MASS INDEX: 26.52 KG/M2 | HEART RATE: 82 BPM | DIASTOLIC BLOOD PRESSURE: 74 MMHG | WEIGHT: 175 LBS | OXYGEN SATURATION: 100 % | TEMPERATURE: 98 F | SYSTOLIC BLOOD PRESSURE: 116 MMHG

## 2020-01-17 DIAGNOSIS — N92.4 EXCESSIVE BLEEDING IN PREMENOPAUSAL PERIOD: ICD-10-CM

## 2020-01-17 DIAGNOSIS — D50.0 IRON DEFICIENCY ANEMIA DUE TO CHRONIC BLOOD LOSS: ICD-10-CM

## 2020-01-17 DIAGNOSIS — D50.9 IRON DEFICIENCY ANEMIA, UNSPECIFIED IRON DEFICIENCY ANEMIA TYPE: ICD-10-CM

## 2020-01-17 DIAGNOSIS — Z86.711 HISTORY OF PULMONARY EMBOLISM: Primary | ICD-10-CM

## 2020-01-17 LAB
BASOPHILS # BLD AUTO: 0.03 X10(3) UL (ref 0–0.2)
BASOPHILS NFR BLD AUTO: 0.4 %
DEPRECATED RDW RBC AUTO: 46 FL (ref 35.1–46.3)
EOSINOPHIL # BLD AUTO: 0.2 X10(3) UL (ref 0–0.7)
EOSINOPHIL NFR BLD AUTO: 2.5 %
ERYTHROCYTE [DISTWIDTH] IN BLOOD BY AUTOMATED COUNT: 19.3 % (ref 11–15)
HCT VFR BLD AUTO: 34.6 % (ref 35–48)
HGB BLD-MCNC: 10.4 G/DL (ref 12–16)
IMM GRANULOCYTES # BLD AUTO: 0.03 X10(3) UL (ref 0–1)
IMM GRANULOCYTES NFR BLD: 0.4 %
IRON SATURATION: 8 % (ref 15–50)
IRON SERPL-MCNC: 31 UG/DL (ref 50–170)
LYMPHOCYTES # BLD AUTO: 2.1 X10(3) UL (ref 1–4)
LYMPHOCYTES NFR BLD AUTO: 26 %
MCH RBC QN AUTO: 20.6 PG (ref 26–34)
MCHC RBC AUTO-ENTMCNC: 30.1 G/DL (ref 31–37)
MCV RBC AUTO: 68.5 FL (ref 80–100)
MONOCYTES # BLD AUTO: 0.47 X10(3) UL (ref 0.1–1)
MONOCYTES NFR BLD AUTO: 5.8 %
NEUTROPHILS # BLD AUTO: 5.26 X10 (3) UL (ref 1.5–7.7)
NEUTROPHILS # BLD AUTO: 5.26 X10(3) UL (ref 1.5–7.7)
NEUTROPHILS NFR BLD AUTO: 64.9 %
PLATELET # BLD AUTO: 341 10(3)UL (ref 150–450)
RBC # BLD AUTO: 5.05 X10(6)UL (ref 3.8–5.3)
TOTAL IRON BINDING CAPACITY: 378 UG/DL (ref 240–450)
TRANSFERRIN SERPL-MCNC: 254 MG/DL (ref 200–360)
WBC # BLD AUTO: 8.1 X10(3) UL (ref 4–11)

## 2020-01-17 PROCEDURE — 91110 GI TRC IMG INTRAL ESOPH-ILE: CPT | Performed by: INTERNAL MEDICINE

## 2020-01-17 PROCEDURE — 99214 OFFICE O/P EST MOD 30 MIN: CPT | Performed by: INTERNAL MEDICINE

## 2020-01-17 PROCEDURE — 83540 ASSAY OF IRON: CPT

## 2020-01-17 PROCEDURE — 0DJ07ZZ INSPECTION OF UPPER INTESTINAL TRACT, VIA NATURAL OR ARTIFICIAL OPENING: ICD-10-PCS | Performed by: INTERNAL MEDICINE

## 2020-01-17 PROCEDURE — 85025 COMPLETE CBC W/AUTO DIFF WBC: CPT

## 2020-01-17 PROCEDURE — 84466 ASSAY OF TRANSFERRIN: CPT

## 2020-01-17 PROCEDURE — 36415 COLL VENOUS BLD VENIPUNCTURE: CPT

## 2020-01-17 NOTE — PROGRESS NOTES
Cancer Center Progress Note    Patient Name: Kirk Stiles   YOB: 1985   Medical Record Number: [id number]   Attending Physician: Tita Guadalupe M.D.        Chief Complaint:  Iron deficiency anemia, menorrhagia, history of provoked pulmonary em Performed by Rich Cervantes MD at [facility] OR   • [address] [place] [address]     •      • COLECTOMY   and    • ESOPHAGOGASTRODUODENOSCOPY (EGD) N/A 10/30/2019    Performed by Jaskaran Sanchez MD at [address] [place] [address] file        Active member of club or organization: Not on file        Attends meetings of clubs or organizations: Not on file        Relationship status: Not on file      Intimate partner violence:        Fear of current or ex partner: Not on file        E Resp 16   Ht 1.727 m (5' 8\")   Wt 79.4 kg (175 lb)   LMP 12/29/2017 (Exact Date)   SpO2 100%   BMI 26.61 kg/m²     Physical Examination:  General: Patient is alert and oriented x 3, not in acute distress.   Psych:  Mood and affect appropriate  HEENT: EOMs re-dose IV iron.   Capsule endoscopy pending return to clinic 2 months    2. pulmonary embolism-provoked now off anticoagulation  --we suspect this was provoked by an implantable hormonal contraceptive which has been removed.  She is doing well off anticoag

## 2020-01-18 VITALS
WEIGHT: 170 LBS | DIASTOLIC BLOOD PRESSURE: 82 MMHG | HEART RATE: 70 BPM | BODY MASS INDEX: 25.76 KG/M2 | OXYGEN SATURATION: 95 % | SYSTOLIC BLOOD PRESSURE: 116 MMHG | HEIGHT: 68 IN | RESPIRATION RATE: 20 BRPM

## 2020-01-24 ENCOUNTER — APPOINTMENT (OUTPATIENT)
Dept: LAB | Age: 35
End: 2020-01-24
Attending: FAMILY MEDICINE
Payer: COMMERCIAL

## 2020-01-24 ENCOUNTER — OFFICE VISIT (OUTPATIENT)
Dept: FAMILY MEDICINE CLINIC | Facility: CLINIC | Age: 35
End: 2020-01-24
Payer: COMMERCIAL

## 2020-01-24 VITALS
HEIGHT: 68 IN | RESPIRATION RATE: 17 BRPM | HEART RATE: 71 BPM | WEIGHT: 175 LBS | TEMPERATURE: 98 F | BODY MASS INDEX: 26.52 KG/M2 | DIASTOLIC BLOOD PRESSURE: 86 MMHG | SYSTOLIC BLOOD PRESSURE: 123 MMHG

## 2020-01-24 DIAGNOSIS — G47.00 INSOMNIA, UNSPECIFIED TYPE: ICD-10-CM

## 2020-01-24 DIAGNOSIS — Z00.00 ANNUAL PHYSICAL EXAM: Primary | ICD-10-CM

## 2020-01-24 DIAGNOSIS — M25.562 CHRONIC PAIN OF LEFT KNEE: ICD-10-CM

## 2020-01-24 DIAGNOSIS — Z00.00 ANNUAL PHYSICAL EXAM: ICD-10-CM

## 2020-01-24 DIAGNOSIS — F32.A DEPRESSION, UNSPECIFIED DEPRESSION TYPE: ICD-10-CM

## 2020-01-24 DIAGNOSIS — G89.29 CHRONIC PAIN OF LEFT KNEE: ICD-10-CM

## 2020-01-24 LAB
ALBUMIN SERPL-MCNC: 3.8 G/DL (ref 3.4–5)
ALBUMIN/GLOB SERPL: 1.1 {RATIO} (ref 1–2)
ALP LIVER SERPL-CCNC: 46 U/L (ref 37–98)
ALT SERPL-CCNC: 20 U/L (ref 13–56)
ANION GAP SERPL CALC-SCNC: 4 MMOL/L (ref 0–18)
AST SERPL-CCNC: 13 U/L (ref 15–37)
BILIRUB SERPL-MCNC: 0.2 MG/DL (ref 0.1–2)
BUN BLD-MCNC: 10 MG/DL (ref 7–18)
BUN/CREAT SERPL: 11.5 (ref 10–20)
CALCIUM BLD-MCNC: 9 MG/DL (ref 8.5–10.1)
CHLORIDE SERPL-SCNC: 108 MMOL/L (ref 98–112)
CHOLEST SMN-MCNC: 202 MG/DL (ref ?–200)
CO2 SERPL-SCNC: 29 MMOL/L (ref 21–32)
CREAT BLD-MCNC: 0.87 MG/DL (ref 0.55–1.02)
EST. AVERAGE GLUCOSE BLD GHB EST-MCNC: 120 MG/DL (ref 68–126)
GLOBULIN PLAS-MCNC: 3.5 G/DL (ref 2.8–4.4)
GLUCOSE BLD-MCNC: 84 MG/DL (ref 70–99)
HBA1C MFR BLD HPLC: 5.8 % (ref ?–5.7)
HDLC SERPL-MCNC: 45 MG/DL (ref 40–59)
LDLC SERPL CALC-MCNC: 135 MG/DL (ref ?–100)
M PROTEIN MFR SERPL ELPH: 7.3 G/DL (ref 6.4–8.2)
NONHDLC SERPL-MCNC: 157 MG/DL (ref ?–130)
OSMOLALITY SERPL CALC.SUM OF ELEC: 290 MOSM/KG (ref 275–295)
PATIENT FASTING Y/N/NP: YES
PATIENT FASTING Y/N/NP: YES
POTASSIUM SERPL-SCNC: 3.9 MMOL/L (ref 3.5–5.1)
SODIUM SERPL-SCNC: 141 MMOL/L (ref 136–145)
TRIGL SERPL-MCNC: 109 MG/DL (ref 30–149)
TSI SER-ACNC: 0.52 MIU/ML (ref 0.36–3.74)
VLDLC SERPL CALC-MCNC: 22 MG/DL (ref 0–30)

## 2020-01-24 PROCEDURE — 83036 HEMOGLOBIN GLYCOSYLATED A1C: CPT

## 2020-01-24 PROCEDURE — 80053 COMPREHEN METABOLIC PANEL: CPT

## 2020-01-24 PROCEDURE — 36415 COLL VENOUS BLD VENIPUNCTURE: CPT

## 2020-01-24 PROCEDURE — 84443 ASSAY THYROID STIM HORMONE: CPT

## 2020-01-24 PROCEDURE — 99395 PREV VISIT EST AGE 18-39: CPT | Performed by: FAMILY MEDICINE

## 2020-01-24 PROCEDURE — 80061 LIPID PANEL: CPT

## 2020-01-24 RX ORDER — ZOLPIDEM TARTRATE 5 MG/1
5 TABLET ORAL NIGHTLY PRN
Qty: 15 TABLET | Refills: 0 | Status: SHIPPED | OUTPATIENT
Start: 2020-01-24 | End: 2020-02-17

## 2020-01-24 NOTE — PROGRESS NOTES
HPI:    Crow Mcclure is a 36year old female presents to clinic for annual physical exam.  Complaining of chronic left knee pain. Has a history of a meniscal repair, was also told she has a torn ACL.   Is unable to exercise because of pain/swelling, i obstruction-at birth   • OTHER SURGICAL HISTORY  2011    Colon (?) mass removed   • OTHER SURGICAL HISTORY  2011    Colon resection for bowel obstruction-one month after colon mass removed.    • TONSILLECTOMY        Family History   Problem Relation Age of change and no tenderness. Abdominal: Soft. Bowel sounds are normal. She exhibits no distension. There is no tenderness. There is no rebound and no guarding. Lymphadenopathy:     She has no cervical adenopathy. Neurological: She is alert.    Vitals rev

## 2020-01-31 ENCOUNTER — TELEPHONE (OUTPATIENT)
Dept: GASTROENTEROLOGY | Facility: CLINIC | Age: 35
End: 2020-01-31

## 2020-01-31 DIAGNOSIS — T18.9XXA FOREIGN BODY IN DIGESTIVE TRACT, INITIAL ENCOUNTER: Primary | ICD-10-CM

## 2020-01-31 DIAGNOSIS — Q85.8: ICD-10-CM

## 2020-01-31 DIAGNOSIS — J33.9 LEFT NASAL POLYPS: ICD-10-CM

## 2020-01-31 DIAGNOSIS — K63.5 INTESTINAL POLYPS: ICD-10-CM

## 2020-01-31 NOTE — H&P
History & Physical Examination    Patient Name: Saralee Meckel  MRN: [id number]  [facility]: [id number]  YOB: 1985    Diagnosis: iron deficiency anemia    No medications prior to admission.     No current facility-administered medications for this en the risks and benefits and alternatives of the procedure with the patient/family. They understand and agree to proceed with plan of care. I have reviewed the History and Physical done within the last 30 days. Any changes noted above.     Luke Ramos

## 2020-01-31 NOTE — OPERATIVE REPORT
VIDEO CAPSULE ENDOSCOPY REPORT    Laura Parrish     1985 Age 36year old   PCP Peggy Persaud MD Gastroenterologist Анна Fuentes MD     Date of procedure: 20    Pre-operative diagnosis: Iron deficiency anemia    Post-operative diagno infusions. 4. Will need repeat CLN this year as well after deep enteroscopy.        Uzair Duran MD

## 2020-01-31 NOTE — TELEPHONE ENCOUNTER
I had a long discussion with the patient about her video capsule endoscopy results. Her capsule did not reach the colon and therefore I have ordered an x-ray to see if capsule has left body. She is asymptomatic at this time.   We discussed that on her vid

## 2020-01-31 NOTE — TELEPHONE ENCOUNTER
Additionally, Dr. Vivi Morelos nurse is Brant Keyes at [phone]. She can help set up balloon enteroscopy with Dr. Lissa Zelaya. If HMO not allowing [facility], then another option is Dr. Isis Lino at [facility].

## 2020-02-01 NOTE — TELEPHONE ENCOUNTER
-    Before entering the referral for this O patient--> please advise if pt may see Dr. Ankush Workman at [address] [place] [address] if he may see Dr. Julianna Frias at [facility], thank you.

## 2020-02-03 ENCOUNTER — HOSPITAL ENCOUNTER (OUTPATIENT)
Dept: GENERAL RADIOLOGY | Facility: HOSPITAL | Age: 35
Discharge: HOME | End: 2020-02-03
Attending: INTERNAL MEDICINE
Payer: COMMERCIAL

## 2020-02-03 ENCOUNTER — OFFICE VISIT (OUTPATIENT)
Dept: HEMATOLOGY/ONCOLOGY | Facility: HOSPITAL | Age: 35
End: 2020-02-03
Attending: INTERNAL MEDICINE
Payer: COMMERCIAL

## 2020-02-03 VITALS
TEMPERATURE: 98 F | HEART RATE: 77 BPM | DIASTOLIC BLOOD PRESSURE: 77 MMHG | RESPIRATION RATE: 16 BRPM | OXYGEN SATURATION: 100 % | SYSTOLIC BLOOD PRESSURE: 131 MMHG

## 2020-02-03 DIAGNOSIS — D50.9 IRON DEFICIENCY ANEMIA: ICD-10-CM

## 2020-02-03 DIAGNOSIS — D50.0 IRON DEFICIENCY ANEMIA DUE TO CHRONIC BLOOD LOSS: Primary | ICD-10-CM

## 2020-02-03 DIAGNOSIS — T18.9XXA FOREIGN BODY IN DIGESTIVE TRACT, INITIAL ENCOUNTER: ICD-10-CM

## 2020-02-03 PROCEDURE — 74019 RADEX ABDOMEN 2 VIEWS: CPT | Performed by: INTERNAL MEDICINE

## 2020-02-03 PROCEDURE — 96376 TX/PRO/DX INJ SAME DRUG ADON: CPT

## 2020-02-03 PROCEDURE — 96365 THER/PROPH/DIAG IV INF INIT: CPT

## 2020-02-03 NOTE — PROGRESS NOTES
Patricia to infuison for iron dextran ordered by  for iron deficiency anemia. Has had iron infusions in the past. HGB 10.4, iron 31, saturation of iron 8% drawn on 1/17    Has fatigue and shortness of breath with exertion.  Plan of care explained with

## 2020-02-04 NOTE — TELEPHONE ENCOUNTER
Noted, thank you EI from [facility]. Referral generated per protocol/written order per Dr. Bonilla Sheffield message below for pt to see Dr. Scherer Current at [facility].  Once referral is authorized, will fax to Dr. Dayron Becerra RN at [phone].    -Awaiting referral Debbie Landry

## 2020-02-06 ENCOUNTER — TELEPHONE (OUTPATIENT)
Dept: GASTROENTEROLOGY | Facility: CLINIC | Age: 35
End: 2020-02-06

## 2020-02-11 ENCOUNTER — PATIENT MESSAGE (OUTPATIENT)
Dept: FAMILY MEDICINE CLINIC | Facility: CLINIC | Age: 35
End: 2020-02-11

## 2020-02-11 ENCOUNTER — TELEPHONE (OUTPATIENT)
Dept: FAMILY MEDICINE CLINIC | Facility: CLINIC | Age: 35
End: 2020-02-11

## 2020-02-11 NOTE — TELEPHONE ENCOUNTER
Reviewed outside hospital records from March 2011. – Preoperative diagnosis large abdominal mass, possibly arising from small bowel.   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

## 2020-02-11 NOTE — TELEPHONE ENCOUNTER
Pt informed of Dr Orin Caldwell response below. Pt states she did not say she saw Dr Kendrick Hall. States she saw a therapist at the [facility] that pt states Dr Rufina Choudhary had referred her to; believes it was a \"Dr Lu Freed \"    Pt scheduled appt with MARIA DEL ROSARIO

## 2020-02-11 NOTE — TELEPHONE ENCOUNTER
Radha Moreira   to Devang Morrison MD           2/11/20 10:58 AM   Good Morning,     I had a visit with my therapist last night and she advised that I reach out to you to discuss medication options.  She said we will fax over her recommendations.      th

## 2020-02-11 NOTE — TELEPHONE ENCOUNTER
From: Aravind Banerjee  To: Stefania Hooker MD  Sent: 2/11/2020 10:58 AM CST  Subject: Other    Good Morning,    I had a visit with my therapist last night and she advised that I reach out to you to discuss medication options.  She said we will fax over her r

## 2020-02-11 NOTE — TELEPHONE ENCOUNTER
Patient was seen in the office by Dr. Rufina Choudhary on 1/24/20 and discussed a depressed mood and feeling overwhelmed with Dr. Rufina Choudhary. Per patient, she has seen Dr. Kendrick Hall and medication was recommended.    Patient states she still has a depressed mood and fee

## 2020-02-11 NOTE — TELEPHONE ENCOUNTER
Left detailed message for patient (DANNY on file to do so) regarding provider's response below. Advised to call back with questions or concerns if needed before appt.

## 2020-02-13 NOTE — TELEPHONE ENCOUNTER
Referral, clinicals, and demographics, insurance information all faxed to Dr. Tony Nicholas/Josie RIVERS at [phone], confirmation received 2/13/2020 @ [phone].  Pt notified of referral authorization and Dr. Shiela Peck contact information to schedule an appt

## 2020-02-18 NOTE — PROGRESS NOTES
HPI:    Lili Nichols is a 36year old female presents to clinic for follow-up regarding anxiety and depression. Patient recently started seeing a therapist who thinks patient could benefit from medication.   Reports she was diagnosed with major depres status: Never Smoker      Smokeless tobacco: Never Used    Alcohol use: Yes      Comment: occ    Drug use: No       Medications (Active prior to today's visit):  Current Outpatient Medications   Medication Sig Dispense Refill   • Zolpidem Tartrate 5 MG Ora 25 minutes was spent on the care of this patient with more than 12.5 minutes of counseling. Orders This Visit:  No orders of the defined types were placed in this encounter.       Meds This Visit:  Requested Prescriptions     Signed Prescriptions

## 2020-04-03 ENCOUNTER — TELEPHONE (OUTPATIENT)
Dept: GASTROENTEROLOGY | Facility: CLINIC | Age: 35
End: 2020-04-03

## 2020-04-03 NOTE — TELEPHONE ENCOUNTER
Pt called stating that Dr. Nel Barnett is requesting a 24hr Holter Moniter test, a 2D Echo, Pulmonary Function test or a Pulmonary Evaluation referral. Pt will need referrals from PCP due to insurance. Please contact pt once referrals have been approved. normal balance

## 2020-04-03 NOTE — TELEPHONE ENCOUNTER
Fax received from Dr. Liana Austin [facility] requesting authorized referral for OV to be sent to them. I reviewed this was already faxed to them along with clinicals on 2/13/2020 @ 0902.      Referral faxed again to Piedad/referral coordinator at [phone], Juan Miguel Joseph

## 2020-04-15 RX ORDER — BUPROPION HYDROCHLORIDE 150 MG/1
TABLET, EXTENDED RELEASE ORAL
Qty: 60 TABLET | Refills: 0 | Status: SHIPPED | OUTPATIENT
Start: 2020-04-15 | End: 2020-07-14

## 2020-04-15 RX ORDER — ZOLPIDEM TARTRATE 5 MG/1
5 TABLET ORAL NIGHTLY PRN
Qty: 15 TABLET | Refills: 0 | Status: SHIPPED | OUTPATIENT
Start: 2020-04-15 | End: 2020-04-17

## 2020-04-17 ENCOUNTER — APPOINTMENT (OUTPATIENT)
Dept: HEMATOLOGY/ONCOLOGY | Facility: HOSPITAL | Age: 35
End: 2020-04-17
Attending: INTERNAL MEDICINE
Payer: COMMERCIAL

## 2020-04-17 ENCOUNTER — TELEPHONE (OUTPATIENT)
Dept: FAMILY MEDICINE CLINIC | Facility: CLINIC | Age: 35
End: 2020-04-17

## 2020-04-17 RX ORDER — ZOLPIDEM TARTRATE 5 MG/1
5 TABLET ORAL NIGHTLY PRN
Qty: 15 TABLET | Refills: 0 | Status: SHIPPED | OUTPATIENT
Start: 2020-04-17 | End: 2020-07-14

## 2020-04-17 RX ORDER — ZOLPIDEM TARTRATE 5 MG/1
5 TABLET ORAL NIGHTLY PRN
Qty: 15 TABLET | Refills: 0 | Status: SHIPPED | OUTPATIENT
Start: 2020-04-17 | End: 2020-04-17

## 2020-04-17 NOTE — TELEPHONE ENCOUNTER
Patient is requesting the medication below to be transferred to the [facility] in [facility] on [address],

## 2020-04-17 NOTE — TELEPHONE ENCOUNTER
Spoke to patient and she wants if possible for the medication to be sent to [facility] [address], [address]

## 2020-04-17 NOTE — TELEPHONE ENCOUNTER
Per [facility],Zolpidem 5mg is on backorder. Please consider an alternative and fax back with approval along with strength, directions, quantity and refills.

## 2020-04-17 NOTE — TELEPHONE ENCOUNTER
Spoke with patient, (  verified ) informed RX was sent to [place] [facility]     Patient verbalizes understanding and agrees.

## 2020-04-17 NOTE — TELEPHONE ENCOUNTER
Please advise on request for script to be sent to new pharmacy, contacted [facility] on [address] they do have Zolpidem 5mg in stock. Script pended.

## 2020-04-22 ENCOUNTER — LAB ENCOUNTER (OUTPATIENT)
Dept: LAB | Age: 35
End: 2020-04-22
Attending: FAMILY MEDICINE
Payer: COMMERCIAL

## 2020-04-22 ENCOUNTER — TELEPHONE (OUTPATIENT)
Dept: FAMILY MEDICINE CLINIC | Facility: CLINIC | Age: 35
End: 2020-04-22

## 2020-04-22 ENCOUNTER — NURSE TRIAGE (OUTPATIENT)
Dept: FAMILY MEDICINE CLINIC | Facility: CLINIC | Age: 35
End: 2020-04-22

## 2020-04-22 ENCOUNTER — VIRTUAL PHONE E/M (OUTPATIENT)
Dept: FAMILY MEDICINE CLINIC | Facility: CLINIC | Age: 35
End: 2020-04-22

## 2020-04-22 DIAGNOSIS — R42 DIZZINESS: ICD-10-CM

## 2020-04-22 DIAGNOSIS — D50.9 IRON DEFICIENCY ANEMIA, UNSPECIFIED IRON DEFICIENCY ANEMIA TYPE: ICD-10-CM

## 2020-04-22 DIAGNOSIS — D50.9 IRON DEFICIENCY ANEMIA, UNSPECIFIED IRON DEFICIENCY ANEMIA TYPE: Primary | ICD-10-CM

## 2020-04-22 DIAGNOSIS — Z86.711 HISTORY OF PULMONARY EMBOLISM: ICD-10-CM

## 2020-04-22 PROCEDURE — 84466 ASSAY OF TRANSFERRIN: CPT

## 2020-04-22 PROCEDURE — 36415 COLL VENOUS BLD VENIPUNCTURE: CPT

## 2020-04-22 PROCEDURE — 85025 COMPLETE CBC W/AUTO DIFF WBC: CPT

## 2020-04-22 PROCEDURE — 83540 ASSAY OF IRON: CPT

## 2020-04-22 PROCEDURE — 99213 OFFICE O/P EST LOW 20 MIN: CPT | Performed by: FAMILY MEDICINE

## 2020-04-22 NOTE — TELEPHONE ENCOUNTER
Action Requested: Summary for Provider     []  Critical Lab, Recommendations Needed  [x] Need Additional Advice  []   FYI    []   Need Orders  [] Need Medications Sent to Pharmacy  []  Other     SUMMARY: per patient she is feeling dizzy and has a headache

## 2020-04-22 NOTE — PROGRESS NOTES
Virtual Telephone Check-In    Ross Coffey verbally consents to a Virtual/Telephone Check-In visit on 04/22/20. Patient understands and accepts financial responsibility for any deductible, co-insurance and/or co-pays associated with this service. reviewing labs, medications, radiology tests and decision making. Appropriate medical decision-making and tests are ordered as detailed in the plan of care above. This note was created by [employer] voice recognition.  Errors in content may be related to

## 2020-04-23 ENCOUNTER — TELEPHONE (OUTPATIENT)
Dept: HEMATOLOGY/ONCOLOGY | Facility: HOSPITAL | Age: 35
End: 2020-04-23

## 2020-04-23 NOTE — TELEPHONE ENCOUNTER
Spoke with [employer]. I let her know that her Hgb and iron are still low. Dr Serena Frias would like to do an iron infusion. Scheduling will call her once it's authed. Verbalized understanding.

## 2020-04-27 ENCOUNTER — OFFICE VISIT (OUTPATIENT)
Dept: HEMATOLOGY/ONCOLOGY | Facility: HOSPITAL | Age: 35
End: 2020-04-27
Attending: INTERNAL MEDICINE
Payer: COMMERCIAL

## 2020-04-27 VITALS
SYSTOLIC BLOOD PRESSURE: 121 MMHG | TEMPERATURE: 98 F | HEART RATE: 88 BPM | DIASTOLIC BLOOD PRESSURE: 66 MMHG | OXYGEN SATURATION: 100 % | RESPIRATION RATE: 16 BRPM

## 2020-04-27 DIAGNOSIS — D50.8 OTHER IRON DEFICIENCY ANEMIA: Primary | ICD-10-CM

## 2020-04-27 DIAGNOSIS — D50.0 IRON DEFICIENCY ANEMIA DUE TO CHRONIC BLOOD LOSS: Primary | ICD-10-CM

## 2020-04-27 DIAGNOSIS — Z86.711 HISTORY OF PULMONARY EMBOLISM: ICD-10-CM

## 2020-04-27 DIAGNOSIS — N92.4 EXCESSIVE BLEEDING IN PREMENOPAUSAL PERIOD: ICD-10-CM

## 2020-04-27 PROCEDURE — 96365 THER/PROPH/DIAG IV INF INIT: CPT

## 2020-04-27 PROCEDURE — 99214 OFFICE O/P EST MOD 30 MIN: CPT | Performed by: INTERNAL MEDICINE

## 2020-04-27 PROCEDURE — 96376 TX/PRO/DX INJ SAME DRUG ADON: CPT

## 2020-04-27 NOTE — PROGRESS NOTES
Patient arrives for iron dextran. She has had it several times in the past. Reports it always helps but at present she can tell her iron is low, reports fatigue and SOB with activity. PIV started to left AC, positive blood return noted.  Test dose given ove

## 2020-04-27 NOTE — PROGRESS NOTES
Cancer Center Progress Note    Patient Name: Lea Avelar   YOB: 1985   Medical Record Number: [id number]   Attending Physician: Rik Gamboa M.D.        Chief Complaint:  Iron deficiency anemia, menorrhagia, history of provoked pulmonary em Surgical History:   Procedure Laterality Date   • ABDOMINAL HYSTERECTOMY N/A 2018    Performed by Itzel Evans MD at [address] [place] [address] OR   • [address] [place] [address]     •      • CAPSULE ENDOSCOPY N/A 2020    Performed by Christo Matos MD Stress: Not on file    Relationships      Social connections:        Talks on phone: Not on file        Gets together: Not on file        Attends Gnosticist service: Not on file        Active member of club or organization: Not on file        Attends meetin no palpable peripheral lymphadenopathy   Chest: Clear to auscultation. Cardiovascular: Regular rate and rhythm. Normal S1S2  Abdomen: Soft, non tender. No hepatosplenomegaly. No palpable mass. Extremities: No edema. Neurological: 5/5 motor x4. time. Thrombophilia workup including antiphospholipid antibodies factor V prothrombin gene mutation Antithrombin screening all negative  --Monitor off anticoagulation    Return to clinic in 3 months with labs.      Samuel Sepulveda MD

## 2020-04-29 ENCOUNTER — APPOINTMENT (OUTPATIENT)
Dept: HEMATOLOGY/ONCOLOGY | Facility: HOSPITAL | Age: 35
End: 2020-04-29
Attending: INTERNAL MEDICINE
Payer: COMMERCIAL

## 2020-04-29 NOTE — TELEPHONE ENCOUNTER
Reviewed letter from Dr. Alice Brar at [facility]:    -anemia is likely due to possible hamartomas seen on OSH video capsule  -planning for double balloon enteroscopy

## 2020-05-15 ENCOUNTER — APPOINTMENT (OUTPATIENT)
Dept: HEMATOLOGY/ONCOLOGY | Facility: HOSPITAL | Age: 35
End: 2020-05-15
Attending: INTERNAL MEDICINE
Payer: COMMERCIAL

## 2020-05-20 ENCOUNTER — TELEPHONE (OUTPATIENT)
Dept: GASTROENTEROLOGY | Facility: CLINIC | Age: 35
End: 2020-05-20

## 2020-05-20 DIAGNOSIS — R93.3 GASTROINTESTINAL TRACT IMAGING ABNORMALITY: ICD-10-CM

## 2020-05-20 DIAGNOSIS — D50.9 IRON DEFICIENCY ANEMIA, UNSPECIFIED IRON DEFICIENCY ANEMIA TYPE: Primary | ICD-10-CM

## 2020-05-20 NOTE — TELEPHONE ENCOUNTER
Dona/Riki 70 called to request referral for upper small intestine endoscopy/CPT 84919. Pt is having test on 6/15/20. Please fax to [phone].

## 2020-05-20 NOTE — TELEPHONE ENCOUNTER
Spoke to Dona/Gene/Dr. Tony Nicholas's office and requested all pertinent information needed for the CPT 67417 procedure they are requesting the referral for. Information provided and referral entered for DOS 6/15/2020.  Unable to perform this test at E

## 2020-05-27 NOTE — TELEPHONE ENCOUNTER
Managed Care- Please advise on Referral that was entered on 5/20/2020 for DOS 6/15/2020 for CPT 71292 procedure due to it not being able to be performed at [address] [place] [address].  Once obtained and completed please notify GI clinical staff and will fax to [address] at [phone]

## 2020-05-27 NOTE — TELEPHONE ENCOUNTER
Dona/Riki 70 state that she spoke to Jitendra Brannon and they won't review until clinicals are attached to referral.  It is still in \"open\" status. Please call.

## 2020-06-01 NOTE — TELEPHONE ENCOUNTER
Managed Care-    Please note that [facility] clinicals have been attached to the referral. Please attach Dr. Xiomy Martins note from 10/24/2019 as well and submit the referral ASAP, thank you.

## 2020-06-05 NOTE — TELEPHONE ENCOUNTER
Authorized referral for procedure at [facility] scheduled on 6/15/2020 has been faxed to [employer] at [phone], confirmation received 6/5/2020 @ 5848. Sent to scanning.

## 2020-06-17 ENCOUNTER — NURSE TRIAGE (OUTPATIENT)
Dept: FAMILY MEDICINE CLINIC | Facility: CLINIC | Age: 35
End: 2020-06-17

## 2020-06-17 ENCOUNTER — TELEPHONE (OUTPATIENT)
Dept: FAMILY MEDICINE CLINIC | Facility: CLINIC | Age: 35
End: 2020-06-17

## 2020-06-17 ENCOUNTER — OFFICE VISIT (OUTPATIENT)
Dept: OPHTHALMOLOGY | Facility: CLINIC | Age: 35
End: 2020-06-17

## 2020-06-17 ENCOUNTER — TELEPHONE (OUTPATIENT)
Dept: GASTROENTEROLOGY | Facility: CLINIC | Age: 35
End: 2020-06-17

## 2020-06-17 DIAGNOSIS — K63.5 INTESTINAL POLYPS: Primary | ICD-10-CM

## 2020-06-17 DIAGNOSIS — H57.12 LEFT EYE PAIN: Primary | ICD-10-CM

## 2020-06-17 DIAGNOSIS — S05.02XA ABRASION OF LEFT CORNEA, INITIAL ENCOUNTER: Primary | ICD-10-CM

## 2020-06-17 DIAGNOSIS — Q85.8: ICD-10-CM

## 2020-06-17 DIAGNOSIS — R68.89: ICD-10-CM

## 2020-06-17 PROCEDURE — 99242 OFF/OP CONSLTJ NEW/EST SF 20: CPT | Performed by: OPHTHALMOLOGY

## 2020-06-17 NOTE — TELEPHONE ENCOUNTER
Forwarded to Dr Krystle Milan high priority. I see per 5/20 encounter that Naa Covington had faxed referral to [phone] at [facility] Dr Linnea Brewer for an apparent double balloon endoscopy.  Patient contacted, states Dr Linnea Brewer unable to remove polyps and had referred he

## 2020-06-17 NOTE — PROGRESS NOTES
Isamar Villalpando is a 36year old female. HPI:     HPI     Consult      Additional comments: Consult per Dr. Denise Chaney              Comments     Patient had a procedure ( upper endoscopy) on 6/15/20 under general anesthesia.   When she woke up, her left ey Yes      Comment: occ    Drug use: No      Medications:  Current Outpatient Medications   Medication Sig Dispense Refill   • buPROPion HCl ER, XL, 300 MG Oral Tablet 24 Hr Take 1 tablet (300 mg total) by mouth daily.  (Patient not taking: Reported on 6/17/2

## 2020-06-17 NOTE — TELEPHONE ENCOUNTER
Dr Urbano Cazares office requesting referral to be sent to office prior to 6/19/20 follow up appointment CPT Code [phone]. Please fax to [phone] attn: Sarah Wills. For additional questions please call. Thank you.

## 2020-06-17 NOTE — TELEPHONE ENCOUNTER
Patient had a procedure on 6/15/20 under general anesthesia. When she woke up, her left eye was red, swollen and had drainage. Now, she has left eye pain and photophobia.   She wears glasses and contacts, but has not worn her contacts since 6/14/20 before

## 2020-06-17 NOTE — TELEPHONE ENCOUNTER
Action Requested: Summary for Provider     []  Critical Lab, Recommendations Needed  [] Need Additional Advice  []   FYI    []   Need Orders  [] Need Medications Sent to Pharmacy  []  Other     SUMMARY:    Call was transferred to the Ophthalmology departme

## 2020-06-17 NOTE — PATIENT INSTRUCTIONS
Left corneal abrasion  Use OTC artifical tears as needed throughout the day. OK to resume contacts once her eye feels back to normal.   [employer] information given.

## 2020-06-17 NOTE — ASSESSMENT & PLAN NOTE
Use OTC artifical tears as needed throughout the day. OK to resume contacts once her eye feels back to normal.   [employer] information given.

## 2020-06-17 NOTE — TELEPHONE ENCOUNTER
Sent to ophthalmology    The patient stated had a procedure done on Monday under anesthesia and when she woke up her left eye was red and swollen. Since than at times she has 10/10 sharp pains to the left eye and becomes watery.     The call was transferr

## 2020-06-18 NOTE — TELEPHONE ENCOUNTER
She has an very rare condition and needs to continue f/u care at [facility] ([facility]), whether that be with Dr. [place] Jaimes or the [facility] surgeon (Dr. Glynn Arthur) that was recommended.  She should only see a surgical oncologist for her condition that has

## 2020-06-18 NOTE — TELEPHONE ENCOUNTER
GI REPORT Patient Name:  Procedure Date: 6/15/2020 8:08 AM  MRN: [id number]  Account Number: [de-identified]  YOB: 1985  Admit Type: Outpatient  Age: 36  Room: GEN PROC 05  Gender: Female  Note Status: Finalized  Attending MD: Katia Krishnamurthy MD  Pr for  histology. Estimated blood loss was minimal. The area distal to the  polypoid lesions was tattooed with an injection of 4ml of saline  followed by 3 mL of Spot (carbon black).   Post-Operative Diagnosis - 2 Large polypoid mass found in jejunum near her consideration but the fragments are too  small to make that histologic diagnosis.     siva PAGE  Interpretation performed by the Attending Pathologist and reviewed with the  Resident/Fellow, NIKKI HOYOS  Electronically Signed Out by [facility]

## 2020-06-18 NOTE — TELEPHONE ENCOUNTER
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 Essence Millan note -->Refer to surgery for resection of area (may need  lap assisted DBE). Referral signed for Dr. Kerry Pillai.  The surgery will need to be done at [facility] as we do not have doubl

## 2020-06-18 NOTE — TELEPHONE ENCOUNTER
Dr. Timmy Matos-    Please review and sign pended STAT/Critical referral as the pt's appt with Dr. Cedillo/General Surgery at [facility] is tomorrow (6/19/2020), thank you. Op/path report below below for your review as well.

## 2020-06-18 NOTE — TELEPHONE ENCOUNTER
[facility]-    Please assist with STAT referral entered by Dr. Pat Lozano as the pt has upcoming consult appt with general surgeon, Dr. Indra Hylton on 6/19/2020 due to her rare condition.  Please review all clinical information below from Dr. Pat Lozano as well as the information via

## 2020-06-18 NOTE — TELEPHONE ENCOUNTER
Pt contacted and reviewed Dr. Venkat Duarte message below and per [facility] referral documentation the referral is now authorized.  Reviewed she is to keep appt with Dr. Corrinne Berlin as scheduled on 6/19/2020, have [facility] update her name in their system (her name is not Lexie as

## 2020-06-22 ENCOUNTER — TELEPHONE (OUTPATIENT)
Dept: GASTROENTEROLOGY | Facility: CLINIC | Age: 35
End: 2020-06-22

## 2020-06-22 DIAGNOSIS — K63.89 JEJUNAL POLYP: Primary | ICD-10-CM

## 2020-06-22 DIAGNOSIS — Q85.8 PEUTZ-JEGHERS SYNDROME (HCC): ICD-10-CM

## 2020-06-22 NOTE — TELEPHONE ENCOUNTER
Dr. Maico Gambino-    Pt was seen by general surgeon Dr. Adamaris Ashraf office on 6/19/2020 d/t her rare condition. Please review note in Epic from his office regarding next steps and recommendations.      Fax received from his office requesting an expedited referral for

## 2020-06-22 NOTE — TELEPHONE ENCOUNTER
[facility]-    Pls assist w/ STAT referral placed for colonoscopy scheduled on 6/29/2020 at [facility], thank you.

## 2020-06-24 ENCOUNTER — TELEPHONE (OUTPATIENT)
Dept: FAMILY MEDICINE CLINIC | Facility: CLINIC | Age: 35
End: 2020-06-24

## 2020-06-24 DIAGNOSIS — Z86.711 HISTORY OF PULMONARY EMBOLUS (PE): Primary | ICD-10-CM

## 2020-06-24 NOTE — TELEPHONE ENCOUNTER
Patient requesting a referral to see a Hemotologist in [address] [place] [address], per Dr Cecilia Griggs from [address] [place] [address] [phone] prior to her having surgery.  States was advised to see hemotologist from [address] [place] [address] instead of [place] [facility]

## 2020-06-25 NOTE — TELEPHONE ENCOUNTER
Authorized referral from [facility] YUDY noted for [facility] to be completed at [facility] on 6/29/2020. Referral faxed to Emmanuel Perry at [facility] at [phone], confirmation received 6/25/2020 @ 6674.

## 2020-06-25 NOTE — TELEPHONE ENCOUNTER
Sedrick White advise on status of STAT referral placed for colonoscopy scheduled on 6/29/2020 at [facility], thank you.

## 2020-06-25 NOTE — TELEPHONE ENCOUNTER
Dr. Neri Smith, patient is requesting a referral to . Referral has been pended, please advise.      06/16/2020 Dr. Akbar Bales is a 32Yrs old female with a history of anemia, unprovoked PE, and SBOs s/p 3 laparotomies c/b incision

## 2020-06-26 NOTE — TELEPHONE ENCOUNTER
JUDAH Herbert at [facility] ([phone]) to confirm they received authorized referral for DOS 6/29/2020.    -Awaiting c/b at this time.

## 2020-06-26 NOTE — TELEPHONE ENCOUNTER
Spoke to [employer] at [facility] ([phone]) and she confirmed she received the authorized referral for DOS 6/29/2020. No further questions or concerns at this time.

## 2020-06-29 ENCOUNTER — TELEPHONE (OUTPATIENT)
Dept: CASE MANAGEMENT | Age: 35
End: 2020-06-29

## 2020-06-29 ENCOUNTER — TELEPHONE (OUTPATIENT)
Dept: GASTROENTEROLOGY | Facility: CLINIC | Age: 35
End: 2020-06-29

## 2020-06-29 DIAGNOSIS — K63.89 JEJUNAL POLYP: Primary | ICD-10-CM

## 2020-06-29 NOTE — TELEPHONE ENCOUNTER
I had a request from her gastroenterologist, Dr. Yue Hodgson, that patient see a Hematologist at [facility] [place] instead. Is this possible?

## 2020-06-29 NOTE — TELEPHONE ENCOUNTER
[facility]-    Please assist w/ referral authorization for DOS 7/27/2020 at [facility], thank you.

## 2020-06-29 NOTE — TELEPHONE ENCOUNTER
Dr. Aby Becker,    Please advise of oncologist you would like Alvaro Bermudez to see and what medical group.     Thank you  Tony Mendez

## 2020-06-29 NOTE — TELEPHONE ENCOUNTER
Dr. Pat Lozano-    Fax received from [facility]/ Dr. Dontrell Petersen's office requesting authorized referral for patient's upcoming small bowel resection laparoscopic surgery scheduled on 7/27/2020. Please review and sign pended referral, thank you.

## 2020-07-01 NOTE — TELEPHONE ENCOUNTER
Hi Dr. Maggie Hartman,    Yes, I have submitted the referral for authorization.     Thank you  Brielle Angelic

## 2020-07-03 NOTE — TELEPHONE ENCOUNTER
Referral is authorized. Referral faxed to [phone], Attn: Arnaldo Rodriguez. Confirmation sheet received. Attempted to reach patient . Patient not available. Left message to call back. Transfer to triage. My Chart message sent.

## 2020-07-06 NOTE — TELEPHONE ENCOUNTER
Spoke to [employer] at [facility] and informed her of referral authorization for DOS 7/27/2020. Pt was already informed via [employer] by [employer] care. Authorized referral faxed to [facility] at [phone], confirmation received 7/6/2020 @ 4666.

## 2020-07-07 ENCOUNTER — TELEPHONE (OUTPATIENT)
Dept: HEMATOLOGY/ONCOLOGY | Facility: HOSPITAL | Age: 35
End: 2020-07-07

## 2020-07-07 NOTE — TELEPHONE ENCOUNTER
Patient need for Dr. Sam Altman to call Dr. Carol Bautista at [phone] DR's RN and Dr. Carol Bautista needs to speak to Dr. Sam Altman before Alma Ortiz is seen due to she is having Surgery on 7/27/20 and he need medical Clearance from Dr. Sam Altman.

## 2020-07-08 ENCOUNTER — APPOINTMENT (OUTPATIENT)
Dept: HEMATOLOGY/ONCOLOGY | Facility: HOSPITAL | Age: 35
End: 2020-07-08
Attending: INTERNAL MEDICINE
Payer: COMMERCIAL

## 2020-07-08 ENCOUNTER — LAB ENCOUNTER (OUTPATIENT)
Dept: LAB | Facility: HOSPITAL | Age: 35
End: 2020-07-08
Attending: INTERNAL MEDICINE
Payer: COMMERCIAL

## 2020-07-08 VITALS
BODY MASS INDEX: 26.37 KG/M2 | OXYGEN SATURATION: 96 % | WEIGHT: 174 LBS | HEART RATE: 91 BPM | HEIGHT: 68 IN | RESPIRATION RATE: 16 BRPM | DIASTOLIC BLOOD PRESSURE: 73 MMHG | TEMPERATURE: 99 F | SYSTOLIC BLOOD PRESSURE: 123 MMHG

## 2020-07-08 DIAGNOSIS — Z86.711 HISTORY OF PULMONARY EMBOLISM: ICD-10-CM

## 2020-07-08 DIAGNOSIS — D50.8 OTHER IRON DEFICIENCY ANEMIA: Primary | ICD-10-CM

## 2020-07-08 DIAGNOSIS — D50.9 IRON DEFICIENCY ANEMIA: ICD-10-CM

## 2020-07-08 DIAGNOSIS — Q85.8 PEUTZ-JEGHERS SYNDROME (HCC): ICD-10-CM

## 2020-07-08 LAB
BASOPHILS # BLD AUTO: 0.04 X10(3) UL (ref 0–0.2)
BASOPHILS NFR BLD AUTO: 0.7 %
DEPRECATED RDW RBC AUTO: 44.6 FL (ref 35.1–46.3)
EOSINOPHIL # BLD AUTO: 0.12 X10(3) UL (ref 0–0.7)
EOSINOPHIL NFR BLD AUTO: 2 %
ERYTHROCYTE [DISTWIDTH] IN BLOOD BY AUTOMATED COUNT: 18.4 % (ref 11–15)
HCT VFR BLD AUTO: 34.1 % (ref 35–48)
HGB BLD-MCNC: 10.4 G/DL (ref 12–16)
IMM GRANULOCYTES # BLD AUTO: 0.04 X10(3) UL (ref 0–1)
IMM GRANULOCYTES NFR BLD: 0.7 %
IRON SATURATION: 15 % (ref 15–50)
IRON SERPL-MCNC: 47 UG/DL (ref 50–170)
LYMPHOCYTES # BLD AUTO: 1.7 X10(3) UL (ref 1–4)
LYMPHOCYTES NFR BLD AUTO: 28.6 %
MCH RBC QN AUTO: 21.1 PG (ref 26–34)
MCHC RBC AUTO-ENTMCNC: 30.5 G/DL (ref 31–37)
MCV RBC AUTO: 69 FL (ref 80–100)
MONOCYTES # BLD AUTO: 0.41 X10(3) UL (ref 0.1–1)
MONOCYTES NFR BLD AUTO: 6.9 %
NEUTROPHILS # BLD AUTO: 3.63 X10 (3) UL (ref 1.5–7.7)
NEUTROPHILS # BLD AUTO: 3.63 X10(3) UL (ref 1.5–7.7)
NEUTROPHILS NFR BLD AUTO: 61.1 %
PLATELET # BLD AUTO: 287 10(3)UL (ref 150–450)
RBC # BLD AUTO: 4.94 X10(6)UL (ref 3.8–5.3)
TOTAL IRON BINDING CAPACITY: 319 UG/DL (ref 240–450)
TRANSFERRIN SERPL-MCNC: 214 MG/DL (ref 200–360)
WBC # BLD AUTO: 5.9 X10(3) UL (ref 4–11)

## 2020-07-08 PROCEDURE — 36415 COLL VENOUS BLD VENIPUNCTURE: CPT

## 2020-07-08 PROCEDURE — 83540 ASSAY OF IRON: CPT

## 2020-07-08 PROCEDURE — 85025 COMPLETE CBC W/AUTO DIFF WBC: CPT

## 2020-07-08 PROCEDURE — 84466 ASSAY OF TRANSFERRIN: CPT

## 2020-07-08 PROCEDURE — 99214 OFFICE O/P EST MOD 30 MIN: CPT | Performed by: INTERNAL MEDICINE

## 2020-07-08 NOTE — PROGRESS NOTES
Cancer Center Progress Note    Patient Name: Luis Brewer   YOB: 1985   Medical Record Number: [id number]   Attending Physician: Mairbel Martinez M.D.        Chief Complaint:  Iron deficiency anemia, menorrhagia, history of provoked pulmonary em small bowel ([facility] [address] 2020    based on workup, suspected diagnosis.  Treatment ongoing at  [facility]. Siva Baez   • Pulmonary embolism [facility]) 2016   • Small bowel polyp 2020    MULTIPLE noted in small bowel on VCE> referred for enteroscopy       Past Surgical Hist Smoking status: Never Smoker      Smokeless tobacco: Never Used    Substance and Sexual Activity      Alcohol use: Yes        Comment: occ      Drug use: No      Sexual activity: Yes        Partners: Male    Lifestyle      Physical activity:        Days pe Reported on 7/8/2020 ), Disp: 60 tablet, Rfl: 0    Allergies:  No Known Allergies     Review of Systems:  All other systems reviewed and negative x12    Vital Signs:  /73 (BP Location: Left arm, Patient Position: Sitting, Cuff Size: adult)   Pulse 91 setting of a hormonal implantable contraceptive. 1. severe iron deficiency/menorrhagia/microcytic anemia  --IV low molecular weight iron dextran received in April 2017 and August 2017 and January 2020  --Status post hysterectomy January 2018.   Chronic G

## 2020-07-14 ENCOUNTER — TELEPHONE (OUTPATIENT)
Dept: HEMATOLOGY/ONCOLOGY | Facility: HOSPITAL | Age: 35
End: 2020-07-14

## 2020-07-14 ENCOUNTER — OFFICE VISIT (OUTPATIENT)
Dept: HEMATOLOGY/ONCOLOGY | Facility: HOSPITAL | Age: 35
End: 2020-07-14
Attending: INTERNAL MEDICINE
Payer: COMMERCIAL

## 2020-07-14 VITALS
SYSTOLIC BLOOD PRESSURE: 121 MMHG | RESPIRATION RATE: 16 BRPM | OXYGEN SATURATION: 100 % | TEMPERATURE: 99 F | HEART RATE: 83 BPM | DIASTOLIC BLOOD PRESSURE: 67 MMHG

## 2020-07-14 DIAGNOSIS — D50.0 IRON DEFICIENCY ANEMIA DUE TO CHRONIC BLOOD LOSS: Primary | ICD-10-CM

## 2020-07-14 PROCEDURE — 96376 TX/PRO/DX INJ SAME DRUG ADON: CPT

## 2020-07-14 PROCEDURE — 96366 THER/PROPH/DIAG IV INF ADDON: CPT

## 2020-07-14 PROCEDURE — 96365 THER/PROPH/DIAG IV INF INIT: CPT

## 2020-07-14 NOTE — TELEPHONE ENCOUNTER
Naya Burton, I scheduled an appointment for Genetic Couseling for this patient, can you enter the reason for me.  Thanks gr

## 2020-07-14 NOTE — PROGRESS NOTES
Patient arrives for iron dextran infusion- she is scheduled every 3 months. She has had Iron Dextran several times in the past. Reports it always helps and has no issues with the infusion.   She states she is currently reporting fatigue and SOB with act

## 2020-07-17 ENCOUNTER — NURSE ONLY (OUTPATIENT)
Dept: HEMATOLOGY/ONCOLOGY | Facility: HOSPITAL | Age: 35
End: 2020-07-17
Payer: COMMERCIAL

## 2020-07-17 ENCOUNTER — GENETICS ENCOUNTER (OUTPATIENT)
Dept: GENETICS | Facility: HOSPITAL | Age: 35
End: 2020-07-17
Attending: INTERNAL MEDICINE
Payer: COMMERCIAL

## 2020-07-17 PROCEDURE — 36415 COLL VENOUS BLD VENIPUNCTURE: CPT

## 2020-07-17 PROCEDURE — 96040 HC GENETIC COUNSELING EA 30 MIN: CPT

## 2020-07-17 NOTE — PROGRESS NOTES
Reason for visit: Ms. Tiera Leiva was seen for the purposes of genetic counseling due to her history of multiple recurrent small bowel polyps.  The purpose of this visit was to review information regarding genetic testing options for mutations in high penetrance negative colonoscopy. Ms. Luis Felipe Rouse reports that her grandmother says other maternal family members have cancers, like stomach and breast cancer.      Ms. Kirk Barriga father  in his 46s and had a history of hypertension, diabetes, a stroke, and an unknown hear approximately 3% of all colorectal cancer cases. Other less common syndromes include familial adenomatous polyposis (FAP), attenuated FAP (AFAP), MYH-associated polyposis (MAP), and Peutz-Jeghers syndrome (PJS).  Direct sequence and/or rearrangement analysi sex cord/Sertoli cell tumors), 9%    Individuals with PJS are at risk for gonadal tumors that often have distinctive pathology.  Males are at risk for lesions with features intermediate between testicular sex cord with annular tubules (SCTATs) and large misael condition. Management guidelines for individuals with pathogenic STK11 variants have been developed. NCCN suggests the following (NCCN. Genetic/Familial High-Risk Assessment: Colorectal. Version 3.2019):     For females:  Clinical breast exams every 6 month cancer predisposition allows patients and their providers to be vigilant in maintaining close and regular contact with their local genetics clinic in anticipation of new information, inform at-risk family members, and diligently follow condition-specific s their lifetime would return to those expected for individuals in the general population.   It should be emphasized that absence of a mutation in an at-risk individual would not eliminate their risk for cancer, but simply return them to the risk expected for Ms. Guy Bolus as soon as results are received; post-test counseling can be scheduled at that time.  Thank you for allowing me to participate in the care of your patient; please do not hesitate to contact my office if you have any questions or concerns, [phone]

## 2020-07-27 ENCOUNTER — APPOINTMENT (OUTPATIENT)
Dept: HEMATOLOGY/ONCOLOGY | Facility: HOSPITAL | Age: 35
End: 2020-07-27
Attending: INTERNAL MEDICINE
Payer: COMMERCIAL

## 2020-07-29 ENCOUNTER — TELEPHONE (OUTPATIENT)
Dept: HEMATOLOGY/ONCOLOGY | Facility: HOSPITAL | Age: 35
End: 2020-07-29

## 2020-07-29 NOTE — TELEPHONE ENCOUNTER
Referring Provider:  Norm Rubio           Reason for Referral:  Ms. Pietro Castillo had genetic testing performed on 7/17/2020 because of a personal history of multiple hamartomatous small intestinal polyps and a suspicion of Peutz-Jeghers syndrome.       Genetic Testing GI tract (Ms. eMnendez [facility] history appears to satisfiy this feature)    - Mucocutaneous hyperpigmentation of the mouth, lips, nose, eyes, genitalia, or fingers (Ms. Reese Overton verbally stated she has hyperpigmentation of the lips, when shown pictures of the charac respiratory tract (PMID: [id number], [id number]).     Cancer risks associated with PJS include (NCCN Genetic/Familial High-Risk Assessment: Colorectal. Version 1.2020):     Breast cancer, 32%-54%  Colorectal cancer, 39%  Pancreatic cancer, 11%-36%  Gastric cance and females:  Colonoscopy every 2-3 years in late teens  Upper endoscopy every 2-3 years beginning in late teens  Baseline small bowel visualization via CT or MRI enterography at approximately 89 years of age with follow-up intervals based on findings  b in management or testing of family members is recommended based on a VUS result. The limitations of the testing were discussed with Ms. Jayden Barnard including the chance that a pathogenic variant in a gene other than those included in this analysis might be the

## 2020-08-19 NOTE — TELEPHONE ENCOUNTER
-stable  -home medications resumed   -HCTZ held      Received disability forms by fax for patient. Patient has not paid $25 form completion fee.

## 2020-08-21 ENCOUNTER — NURSE TRIAGE (OUTPATIENT)
Dept: FAMILY MEDICINE CLINIC | Facility: CLINIC | Age: 35
End: 2020-08-21

## 2020-08-21 ENCOUNTER — TELEMEDICINE (OUTPATIENT)
Dept: FAMILY MEDICINE CLINIC | Facility: CLINIC | Age: 35
End: 2020-08-21

## 2020-08-21 DIAGNOSIS — B37.3 YEAST VAGINITIS: Primary | ICD-10-CM

## 2020-08-21 PROCEDURE — 99213 OFFICE O/P EST LOW 20 MIN: CPT | Performed by: FAMILY MEDICINE

## 2020-08-21 RX ORDER — FLUCONAZOLE 150 MG/1
TABLET ORAL
Qty: 2 TABLET | Refills: 0 | Status: SHIPPED | OUTPATIENT
Start: 2020-08-21 | End: 2020-08-25

## 2020-08-21 NOTE — PROGRESS NOTES
HPI:    Nathan Matute is a 36year old female presents for Doximity visit with concerns regarding vaginal discharge. Reports 4 days back, she developed a thick white discharge. Tried taking over-the-counter Monistat which caused her more discomfort. status: Never Smoker      Smokeless tobacco: Never Used    Alcohol use: Yes      Comment: occ    Drug use: No       Medications (Active prior to today's visit):  Current Outpatient Medications   Medication Sig Dispense Refill   • fluconazole 150 MG Oral Ta fluconazole 150 MG Oral Tab 2 tablet 0     Sig: To take one tablet and repeat dose in 3 days if no improvement. Imaging & Referrals:  None       This note was created by [employer] voice recognition.  Errors in content may be related to improper recogniti

## 2020-08-21 NOTE — TELEPHONE ENCOUNTER
Action Requested: Summary for Provider     []  Critical Lab, Recommendations Needed  [] Need Additional Advice  [x]   FYI    []   Need Orders  [] Need Medications Sent to Pharmacy  []  Other     SUMMARY:   Spoke with pt,  verified, pt c/o poss yeast inf

## 2020-08-25 ENCOUNTER — OFFICE VISIT (OUTPATIENT)
Dept: FAMILY MEDICINE CLINIC | Facility: CLINIC | Age: 35
End: 2020-08-25

## 2020-08-25 VITALS
HEART RATE: 73 BPM | SYSTOLIC BLOOD PRESSURE: 121 MMHG | TEMPERATURE: 98 F | WEIGHT: 169 LBS | RESPIRATION RATE: 16 BRPM | BODY MASS INDEX: 25 KG/M2 | DIASTOLIC BLOOD PRESSURE: 75 MMHG

## 2020-08-25 DIAGNOSIS — Z12.39 ENCOUNTER FOR SCREENING BREAST EXAMINATION: ICD-10-CM

## 2020-08-25 DIAGNOSIS — Z01.419 NORMAL PELVIC EXAM: ICD-10-CM

## 2020-08-25 DIAGNOSIS — Z91.89 AT HIGH RISK FOR BREAST CANCER: ICD-10-CM

## 2020-08-25 DIAGNOSIS — N76.0 ACUTE VAGINITIS: Primary | ICD-10-CM

## 2020-08-25 LAB
C TRACH DNA SPEC QL NAA+PROBE: NEGATIVE
N GONORRHOEA DNA SPEC QL NAA+PROBE: NEGATIVE

## 2020-08-25 PROCEDURE — 3078F DIAST BP <80 MM HG: CPT | Performed by: FAMILY MEDICINE

## 2020-08-25 PROCEDURE — 99214 OFFICE O/P EST MOD 30 MIN: CPT | Performed by: FAMILY MEDICINE

## 2020-08-25 PROCEDURE — 3074F SYST BP LT 130 MM HG: CPT | Performed by: FAMILY MEDICINE

## 2020-08-25 NOTE — PROGRESS NOTES
HPI:    Eva Berumen is a 36year old female presents to clinic for follow-up. Had symptoms of a yeast infection last week, took Diflucan and has had some improvement. Still reports occasional itching, no discharge. Denies new sexual partners.   Arch [employer] History: Social History    Tobacco Use      Smoking status: Never Smoker      Smokeless tobacco: Never Used    Alcohol use: Yes      Comment: occ    Drug use: No       Medications (Active prior to today's visit):  No current outpatient medications on file. Amplification [E]      Meds This Visit:  Requested Prescriptions      No prescriptions requested or ordered in this encounter       Imaging & Referrals:  MRI BREAST (W+WO) W/CAD BILAT (CPT=77049)  DANIEL EJ 2D+3D SCREENING BILAT (CPT=77067/50836)       This

## 2020-08-27 LAB
GENITAL VAGINOSIS SCREEN: NEGATIVE
HPV I/H RISK 1 DNA SPEC QL NAA+PROBE: NEGATIVE
TRICHOMONAS SCREEN: NEGATIVE

## 2020-09-19 ENCOUNTER — HOSPITAL ENCOUNTER (OUTPATIENT)
Dept: MAMMOGRAPHY | Facility: HOSPITAL | Age: 35
Discharge: HOME | End: 2020-09-19
Attending: FAMILY MEDICINE
Payer: COMMERCIAL

## 2020-09-19 DIAGNOSIS — Z91.89 AT HIGH RISK FOR BREAST CANCER: ICD-10-CM

## 2020-10-05 ENCOUNTER — OFFICE VISIT (OUTPATIENT)
Dept: HEMATOLOGY/ONCOLOGY | Facility: HOSPITAL | Age: 35
End: 2020-10-05
Attending: INTERNAL MEDICINE
Payer: COMMERCIAL

## 2020-10-05 ENCOUNTER — TELEPHONE (OUTPATIENT)
Dept: HEMATOLOGY/ONCOLOGY | Facility: HOSPITAL | Age: 35
End: 2020-10-05

## 2020-10-05 ENCOUNTER — HOSPITAL ENCOUNTER (OUTPATIENT)
Dept: MAMMOGRAPHY | Facility: HOSPITAL | Age: 35
Discharge: HOME | End: 2020-10-05
Attending: FAMILY MEDICINE
Payer: COMMERCIAL

## 2020-10-05 VITALS
OXYGEN SATURATION: 100 % | WEIGHT: 169 LBS | BODY MASS INDEX: 25.61 KG/M2 | HEART RATE: 73 BPM | DIASTOLIC BLOOD PRESSURE: 57 MMHG | SYSTOLIC BLOOD PRESSURE: 136 MMHG | RESPIRATION RATE: 16 BRPM | TEMPERATURE: 98 F | HEIGHT: 68 IN

## 2020-10-05 DIAGNOSIS — N92.4 EXCESSIVE BLEEDING IN PREMENOPAUSAL PERIOD: ICD-10-CM

## 2020-10-05 DIAGNOSIS — Z86.711 HISTORY OF PULMONARY EMBOLISM: ICD-10-CM

## 2020-10-05 DIAGNOSIS — Q85.8 PEUTZ-JEGHERS SYNDROME (HCC): ICD-10-CM

## 2020-10-05 DIAGNOSIS — D50.8 OTHER IRON DEFICIENCY ANEMIA: Primary | ICD-10-CM

## 2020-10-05 PROCEDURE — 99214 OFFICE O/P EST MOD 30 MIN: CPT | Performed by: INTERNAL MEDICINE

## 2020-10-05 PROCEDURE — 77063 BREAST TOMOSYNTHESIS BI: CPT | Performed by: FAMILY MEDICINE

## 2020-10-05 PROCEDURE — 77067 SCR MAMMO BI INCL CAD: CPT | Performed by: FAMILY MEDICINE

## 2020-10-05 NOTE — TELEPHONE ENCOUNTER
Spoke with Daniel Hernandez, updated her that Dr Jonnathan Jensen reviewed her labs, iron and hg both improved. He recommends fu in 6 months w repeat labs. F/u appt made, pt verbalizes understanding.

## 2020-10-05 NOTE — PROGRESS NOTES
Cancer Center Progress Note    Patient Name: Crow Mcclure   YOB: 1985   Medical Record Number: [id number]   Attending Physician: Zain Johnson M.D.        Chief Complaint:  Iron deficiency anemia, menorrhagia, history of provoked pulmonary em 2020    based on workup, suspected diagnosis.  Treatment ongoing at  [facility]-Dr. Odonnell Signs   • Pulmonary embolism [facility]) 2016   • Small bowel polyp 2020    MULTIPLE noted in small bowel on VCE> referred for enteroscopy       Past Surgical History:  Past Surgica Smoker      Smokeless tobacco: Never Used    Substance and Sexual Activity      Alcohol use: Yes        Comment: occ      Drug use: No      Sexual activity: Yes        Partners: Male    Lifestyle      Physical activity        Days per week: Not on file EOMs intact. PERRL. Oropharynx is clear. Neck: No JVD. No palpable lymphadenopathy. Neck is supple. Lymphatics: There is no palpable peripheral lymphadenopathy   Chest: Clear to auscultation. Cardiovascular: Regular rate and rhythm.  Normal S1S2  Abdome contraceptive which has been removed.  She is doing well off anticoagulation at this time. Thrombophilia workup including antiphospholipid antibodies factor V prothrombin gene mutation Antithrombin screening all negative  --Monitor off anticoagulation.   If

## 2020-10-12 ENCOUNTER — TELEPHONE (OUTPATIENT)
Dept: PHYSICAL THERAPY | Age: 35
End: 2020-10-12

## 2020-10-12 ENCOUNTER — OFFICE VISIT (OUTPATIENT)
Dept: FAMILY MEDICINE CLINIC | Facility: CLINIC | Age: 35
End: 2020-10-12

## 2020-10-12 ENCOUNTER — HOSPITAL ENCOUNTER (OUTPATIENT)
Dept: MRI IMAGING | Facility: HOSPITAL | Age: 35
Discharge: HOME | End: 2020-10-12
Attending: FAMILY MEDICINE
Payer: COMMERCIAL

## 2020-10-12 VITALS
HEART RATE: 87 BPM | DIASTOLIC BLOOD PRESSURE: 76 MMHG | HEIGHT: 69 IN | WEIGHT: 171.81 LBS | SYSTOLIC BLOOD PRESSURE: 120 MMHG | BODY MASS INDEX: 25.45 KG/M2 | TEMPERATURE: 98 F

## 2020-10-12 DIAGNOSIS — Z23 FLU VACCINE NEED: ICD-10-CM

## 2020-10-12 DIAGNOSIS — Z91.89 AT HIGH RISK FOR BREAST CANCER: ICD-10-CM

## 2020-10-12 DIAGNOSIS — Z90.49 S/P SMALL BOWEL RESECTION: ICD-10-CM

## 2020-10-12 DIAGNOSIS — Z12.39 ENCOUNTER FOR SCREENING BREAST EXAMINATION: ICD-10-CM

## 2020-10-12 DIAGNOSIS — K64.9 HEMORRHOIDS, UNSPECIFIED HEMORRHOID TYPE: ICD-10-CM

## 2020-10-12 DIAGNOSIS — K59.00 CONSTIPATION, UNSPECIFIED CONSTIPATION TYPE: Primary | ICD-10-CM

## 2020-10-12 PROCEDURE — 3078F DIAST BP <80 MM HG: CPT | Performed by: FAMILY MEDICINE

## 2020-10-12 PROCEDURE — 3074F SYST BP LT 130 MM HG: CPT | Performed by: FAMILY MEDICINE

## 2020-10-12 PROCEDURE — 77049 MRI BREAST C-+ W/CAD BI: CPT | Performed by: FAMILY MEDICINE

## 2020-10-12 PROCEDURE — 3008F BODY MASS INDEX DOCD: CPT | Performed by: FAMILY MEDICINE

## 2020-10-12 PROCEDURE — A9575 INJ GADOTERATE MEGLUMI 0.1ML: HCPCS | Performed by: FAMILY MEDICINE

## 2020-10-12 PROCEDURE — 99214 OFFICE O/P EST MOD 30 MIN: CPT | Performed by: FAMILY MEDICINE

## 2020-10-12 NOTE — PROGRESS NOTES
HPI:    Adama Mcneil is a 30year old female presents to clinic for follow-up. Patient had a small bowel resection at the end of July. Reports that pain has improved but she is still suffering from constipation.   Reports that she needs to take [facility] Age of Onset   • Diabetes Father    • Heart Disorder Father    • Hypertension Father    • Diabetes Mother    • Heart Disorder Mother    • Cancer Mother         liver cancer   • Diabetes Paternal Grandfather    • Hypertension Paternal Grandfather    • Sarah Swartz which makes her nauseous, I advised using Metamucil or similar fiber supplement daily. Proctofoam to pharmacy to use as needed hemorrhoidal bleeding.   If she feels a palpable swelling/mass near her rectum, she will return to clinic for eval.    (Z23) Flu

## 2020-10-13 ENCOUNTER — TELEPHONE (OUTPATIENT)
Dept: FAMILY MEDICINE CLINIC | Facility: CLINIC | Age: 35
End: 2020-10-13

## 2020-10-13 ENCOUNTER — TELEPHONE (OUTPATIENT)
Dept: ULTRASOUND IMAGING | Facility: HOSPITAL | Age: 35
End: 2020-10-13

## 2020-10-13 DIAGNOSIS — R92.8 ABNORMAL MRI, BREAST: Primary | ICD-10-CM

## 2020-10-13 NOTE — IMAGING NOTE
Called at [phone] Discussed recommended breast biopsy with patient. Patient is being recommended for MRI biopsy of the  Right Breast  by Dr. Cristal Barnard. Miss Belinda Chairez is single has 4 children 6 boy 6 boy 9 boy and 11 yr old girl.  She is currently working out o can be performed to identify a sonographic target. If no   sonographic target is identified, then MR guided biopsy is recommended.      BI-RADS CATEGORY:     DIAGNOSTIC CATEGORY 4--SUSPICIOUS       RECOMMENDATIONS:   ULTRASOUND: RIGHT BREAST  --We will lalita BIOPSY TO AID WITH  PROMOTING ADEQUATE KIDNEY FUNCTION WHILE RECEIVING CONTRAST. You will receive contrast to assist locating the area to be biopsied. Mri  imaging will be   reviewed and compared to previous mri.    Once site is confirmed the Radiologist w

## 2020-10-13 NOTE — TELEPHONE ENCOUNTER
Dr Ana Opitz returned call informed of second look us and mri Bx recommendations. DR Roman Treviño she will contact Lili Nichols today .  Dr Roman Treviño putting 3 order 1 for second look 1 for us Bx if needed and 1 for mri bx

## 2020-10-13 NOTE — TELEPHONE ENCOUNTER
Spoke to patient at 21 [phone] on 10/13 to discuss MRI results. Next steps are to obtain a second look ultrasound and proceed seed with biopsy-either ultrasound/MR guided. Patient verbalized understanding, agreeable to plan  Those orders are in [place]-thanks!

## 2020-10-15 ENCOUNTER — HOSPITAL ENCOUNTER (OUTPATIENT)
Dept: ULTRASOUND IMAGING | Facility: HOSPITAL | Age: 35
Discharge: HOME | End: 2020-10-15
Attending: FAMILY MEDICINE
Payer: COMMERCIAL

## 2020-10-15 DIAGNOSIS — R92.8 ABNORMAL MRI, BREAST: ICD-10-CM

## 2020-10-15 PROCEDURE — 76642 ULTRASOUND BREAST LIMITED: CPT | Performed by: FAMILY MEDICINE

## 2020-10-19 ENCOUNTER — HOSPITAL ENCOUNTER (OUTPATIENT)
Dept: GENERAL RADIOLOGY | Facility: HOSPITAL | Age: 35
Discharge: HOME | End: 2020-10-19
Attending: FAMILY MEDICINE
Payer: COMMERCIAL

## 2020-10-19 ENCOUNTER — HOSPITAL ENCOUNTER (OUTPATIENT)
Dept: MAMMOGRAPHY | Facility: HOSPITAL | Age: 35
Discharge: HOME | End: 2020-10-19
Attending: FAMILY MEDICINE
Payer: COMMERCIAL

## 2020-10-19 ENCOUNTER — HOSPITAL ENCOUNTER (OUTPATIENT)
Dept: MRI IMAGING | Facility: HOSPITAL | Age: 35
Discharge: HOME | End: 2020-10-19
Attending: FAMILY MEDICINE
Payer: COMMERCIAL

## 2020-10-19 VITALS
BODY MASS INDEX: 25.18 KG/M2 | WEIGHT: 170 LBS | RESPIRATION RATE: 16 BRPM | HEART RATE: 89 BPM | DIASTOLIC BLOOD PRESSURE: 68 MMHG | HEIGHT: 69 IN | SYSTOLIC BLOOD PRESSURE: 111 MMHG

## 2020-10-19 DIAGNOSIS — R92.8 ABNORMAL MAMMOGRAM: ICD-10-CM

## 2020-10-19 DIAGNOSIS — R92.8 ABNORMAL MRI, BREAST: ICD-10-CM

## 2020-10-19 PROCEDURE — 19085 BX BREAST 1ST LESION MR IMAG: CPT | Performed by: FAMILY MEDICINE

## 2020-10-19 PROCEDURE — 77065 DX MAMMO INCL CAD UNI: CPT | Performed by: FAMILY MEDICINE

## 2020-10-19 PROCEDURE — 88305 TISSUE EXAM BY PATHOLOGIST: CPT | Performed by: FAMILY MEDICINE

## 2020-10-19 PROCEDURE — A9575 INJ GADOTERATE MEGLUMI 0.1ML: HCPCS | Performed by: FAMILY MEDICINE

## 2020-10-19 NOTE — PROCEDURES
[facility]  Procedure Note    Kirk Stiles Patient Status:  Outpatient    1985 MRN [id number]   Location [facility] MRI Attending Julia Becerra MD   Hosp Day # 0 PCP Shi Faust MD     Procedure: MRI guided right Sydnee Burgess

## 2020-10-19 NOTE — IMAGING NOTE
1105 To Radiology holding area hx as follow:  RIGHT BR MASS, NKDA. 1115  Pt denies taking any ASA , NSAIDS. Denies taking any daily home meds. Procedure explained, Q&A done, consent obtained. MRI safety sheet filled out, pt in gown all metal removed.   V

## 2020-10-20 NOTE — IMAGING NOTE
Miss Kaba Kevin is s/p biopsy . Phoned and introduced myself as breast coordinator . Reinforced to patient  post biopsy care and instructions .     No c/o post procedure \"little painful using ice with good relief    Informed  and shared the pathology results

## 2020-11-19 ENCOUNTER — OFFICE VISIT (OUTPATIENT)
Dept: PHYSICAL THERAPY | Facility: HOSPITAL | Age: 35
End: 2020-11-19
Attending: ORTHOPAEDIC SURGERY
Payer: COMMERCIAL

## 2020-11-19 DIAGNOSIS — M94.262 CHONDROMALACIA OF LEFT KNEE: ICD-10-CM

## 2020-11-19 DIAGNOSIS — M25.562 LEFT KNEE PAIN, UNSPECIFIED CHRONICITY: ICD-10-CM

## 2020-11-19 PROCEDURE — 97161 PT EVAL LOW COMPLEX 20 MIN: CPT

## 2020-11-19 PROCEDURE — 97110 THERAPEUTIC EXERCISES: CPT

## 2020-11-19 NOTE — PROGRESS NOTES
LOWER EXTREMITY EVALUATION:   Referring Physician: Dr. Brandy Waller  Diagnosis: Left knee pain, unspecified chronicity (M25.562)  Chondromalacia of left knee (S65.279)     Date of Service: 11/19/2020     PATIENT SUMMARY   Eva Berumen is a 30year old transfusion 2016    [facility]   • Knee swelling    • Peutz-Jeghers polyps of small bowel ([facility] [address] 2020    based on workup, suspected diagnosis.  Treatment ongoing at [facility] Lady Mares   • [facility]   • Small bowel polyp 2020    MULTIPLE noted i Special tests:   Neg varus/valgus stress tests  Neg ant/post drawer for ACL and PCL   Neg prone apley's   Min discomfort with lipscomb's sign     Gait: pt ambulates on level ground with normal mechanics - limited assessment of gait due to time.  Will asse Gait training, Manual Therapy, Neuromuscular Re-education, Self-Care Home Management, Therapeutic Activities, Therapeutic Exercise, Home Exercise Program instruction and Modalities to include: Electrical stimulation (unattended) and Heat/ice    Education o

## 2020-11-23 ENCOUNTER — OFFICE VISIT (OUTPATIENT)
Dept: PHYSICAL THERAPY | Facility: HOSPITAL | Age: 35
End: 2020-11-23
Attending: ORTHOPAEDIC SURGERY
Payer: COMMERCIAL

## 2020-11-23 PROCEDURE — 97110 THERAPEUTIC EXERCISES: CPT

## 2020-11-23 PROCEDURE — 97140 MANUAL THERAPY 1/> REGIONS: CPT

## 2020-11-23 NOTE — PROGRESS NOTES
Dx:  Left knee pain, unspecified chronicity (M25.562)  Chondromalacia of left knee (M94.262)             Insurance (Authorized # of Visits):  [address] [place] [address]. Physician: Dr. Dara Hodges  Next MD visit: none scheduled  Fall Risk: standard 10 B   LAQ's - 5x5\"       Neuro Justina:   Quad sets - 10x10\" with double towel roll  Glute sets supine - 10x10\"       HEP: quad sets, saq's, laq's, glute sets, clams and heel slides     Charges: Manual - 1, TherEx - 1         Total Timed Treatment: 32 min

## 2020-11-30 ENCOUNTER — TELEPHONE (OUTPATIENT)
Dept: PHYSICAL THERAPY | Facility: HOSPITAL | Age: 35
End: 2020-11-30

## 2020-11-30 ENCOUNTER — APPOINTMENT (OUTPATIENT)
Dept: PHYSICAL THERAPY | Facility: HOSPITAL | Age: 35
End: 2020-11-30
Attending: ORTHOPAEDIC SURGERY
Payer: COMMERCIAL

## 2020-12-02 ENCOUNTER — OFFICE VISIT (OUTPATIENT)
Dept: PHYSICAL THERAPY | Facility: HOSPITAL | Age: 35
End: 2020-12-02
Attending: ORTHOPAEDIC SURGERY
Payer: COMMERCIAL

## 2020-12-02 VITALS — HEART RATE: 72 BPM | SYSTOLIC BLOOD PRESSURE: 131 MMHG | DIASTOLIC BLOOD PRESSURE: 91 MMHG | OXYGEN SATURATION: 100 %

## 2020-12-02 PROCEDURE — 97110 THERAPEUTIC EXERCISES: CPT

## 2020-12-02 PROCEDURE — 97140 MANUAL THERAPY 1/> REGIONS: CPT

## 2020-12-03 NOTE — PROGRESS NOTES
Dx:  Left knee pain, unspecified chronicity (M25.562)  Chondromalacia of left knee (M94.262)             Insurance (Authorized # of Visits):  [address] [place] [address]. Physician: Dr. Chantale Roberson  Next MD visit: none scheduled  Fall Risk: standard TX#: 5/ Date:    Tx#: 6/   Manual:  STM to L knee   Tibiofemoral distraction - grd II   Post glide in flexed position - grd II Manual:  STM to L knee   Tibiofemoral distraction - grd II       TherEx:   DAVID's - 15x5\"  Heel slides with AA from sheet around

## 2020-12-06 ENCOUNTER — PATIENT MESSAGE (OUTPATIENT)
Dept: FAMILY MEDICINE CLINIC | Facility: CLINIC | Age: 35
End: 2020-12-06

## 2020-12-06 ENCOUNTER — TELEPHONE (OUTPATIENT)
Dept: FAMILY MEDICINE CLINIC | Facility: CLINIC | Age: 35
End: 2020-12-06

## 2020-12-07 ENCOUNTER — APPOINTMENT (OUTPATIENT)
Dept: PHYSICAL THERAPY | Facility: HOSPITAL | Age: 35
End: 2020-12-07
Attending: ORTHOPAEDIC SURGERY
Payer: COMMERCIAL

## 2020-12-07 ENCOUNTER — TELEPHONE (OUTPATIENT)
Dept: PHYSICAL THERAPY | Facility: HOSPITAL | Age: 35
End: 2020-12-07

## 2020-12-07 NOTE — TELEPHONE ENCOUNTER
From: Hussain Sutton  To: Parag Antonio MD  Sent: 12/6/2020 9:20 PM CST  Subject: Non-Urgent Medical Question    Geovanny Person had headaches for the last two weeks now, no fever.  Last week I started taking my blood pressure with an at home monitor and m

## 2020-12-07 NOTE — TELEPHONE ENCOUNTER
SUMMARY: has been having headaches for 2 weeks. Was checking her blood pressures have been high. /75, 174/93, 166/111, 158/127, 147/96. Patient mentions she has been dealing with this for months and Corine Zhang is aware of the situation.     Amy

## 2020-12-07 NOTE — TELEPHONE ENCOUNTER
Advised to call re: sx's    From: Aravind Banerjee  To: Stefania Hooker MD  Sent: 12/6/2020  9:20 PM CST  Subject: Non-Urgent Medical Question    Edinson Person had headaches for the last two weeks now, no fever.  Last week I started taking my blood pressure

## 2020-12-07 NOTE — TELEPHONE ENCOUNTER
Called pt due to no show this date. Pt thought appt was at 7 pm. She states that her BP has still been elevated. She called MD and has appt with them tomorrow. Asked pt to get clearance from MD to continue with PT prior to Wednesdays appt.

## 2020-12-08 ENCOUNTER — OFFICE VISIT (OUTPATIENT)
Dept: FAMILY MEDICINE CLINIC | Facility: CLINIC | Age: 35
End: 2020-12-08

## 2020-12-08 VITALS
HEART RATE: 76 BPM | WEIGHT: 177 LBS | BODY MASS INDEX: 26.22 KG/M2 | DIASTOLIC BLOOD PRESSURE: 84 MMHG | SYSTOLIC BLOOD PRESSURE: 122 MMHG | HEIGHT: 69 IN | TEMPERATURE: 97 F

## 2020-12-08 DIAGNOSIS — F41.9 ANXIETY: ICD-10-CM

## 2020-12-08 DIAGNOSIS — R03.0 ELEVATED BP WITHOUT DIAGNOSIS OF HYPERTENSION: Primary | ICD-10-CM

## 2020-12-08 DIAGNOSIS — G47.00 INSOMNIA, UNSPECIFIED TYPE: ICD-10-CM

## 2020-12-08 PROCEDURE — 3074F SYST BP LT 130 MM HG: CPT | Performed by: FAMILY MEDICINE

## 2020-12-08 PROCEDURE — 3079F DIAST BP 80-89 MM HG: CPT | Performed by: FAMILY MEDICINE

## 2020-12-08 PROCEDURE — 3008F BODY MASS INDEX DOCD: CPT | Performed by: FAMILY MEDICINE

## 2020-12-08 PROCEDURE — 99214 OFFICE O/P EST MOD 30 MIN: CPT | Performed by: FAMILY MEDICINE

## 2020-12-08 RX ORDER — ESCITALOPRAM OXALATE 10 MG/1
10 TABLET ORAL DAILY
Qty: 90 TABLET | Refills: 0 | Status: SHIPPED | OUTPATIENT
Start: 2020-12-08 | End: 2021-04-05 | Stop reason: ALTCHOICE

## 2020-12-08 NOTE — PROGRESS NOTES
HPI:    Svtelana Aguillon is a 30year old female presents to clinic with concerns regarding her blood pressure. Has been told at physical therapy that it is mildly elevated, also use a home blood pressure cuff and has noticed its high.   Ranges from 130-1 History   Problem Relation Age of Onset   • Diabetes Father    • Heart Disorder Father    • Hypertension Father    • Diabetes Mother    • Heart Disorder Mother    • Cancer Mother         liver cancer   • Diabetes Paternal Grandfather    • Hypertension Eliot Bateman accuracy. Reviewed the last few pressures from her physical therapy sessions, one has been mildly elevated, others within normal limits. A mild elevation of blood pressure is likely not the cause of her symptoms.   However I did advise her to limit salt i

## 2020-12-09 ENCOUNTER — OFFICE VISIT (OUTPATIENT)
Dept: PHYSICAL THERAPY | Facility: HOSPITAL | Age: 35
End: 2020-12-09
Attending: ORTHOPAEDIC SURGERY
Payer: COMMERCIAL

## 2020-12-09 VITALS — DIASTOLIC BLOOD PRESSURE: 79 MMHG | SYSTOLIC BLOOD PRESSURE: 122 MMHG | HEART RATE: 82 BPM

## 2020-12-09 PROCEDURE — 97140 MANUAL THERAPY 1/> REGIONS: CPT

## 2020-12-09 PROCEDURE — 97110 THERAPEUTIC EXERCISES: CPT

## 2020-12-10 NOTE — PROGRESS NOTES
Dx:  Left knee pain, unspecified chronicity (M25.562)  Chondromalacia of left knee (M94.262)             Insurance (Authorized # of Visits):  [address] [place] [address]. Physician: Dr. Chantale Roberson  Next MD visit: none scheduled  Fall Risk: standard Date: 12/2/2020             TX#: 3/8 Date: 12/9/2020             TX#: 4/8 Date:                 TX#: 5/ Date:    Tx#: 6/   Manual:  STM to L knee   Tibiofemoral distraction - grd II   Post glide in flexed position - grd II Manual:  STM to L knee   Tibiofemo

## 2020-12-14 ENCOUNTER — OFFICE VISIT (OUTPATIENT)
Dept: PHYSICAL THERAPY | Facility: HOSPITAL | Age: 35
End: 2020-12-14
Attending: ORTHOPAEDIC SURGERY
Payer: COMMERCIAL

## 2020-12-14 VITALS — SYSTOLIC BLOOD PRESSURE: 134 MMHG | HEART RATE: 91 BPM | DIASTOLIC BLOOD PRESSURE: 87 MMHG

## 2020-12-14 PROCEDURE — 97140 MANUAL THERAPY 1/> REGIONS: CPT

## 2020-12-14 PROCEDURE — 97110 THERAPEUTIC EXERCISES: CPT

## 2020-12-14 NOTE — PROGRESS NOTES
Dx:  Left knee pain, unspecified chronicity (M25.562)  Chondromalacia of left knee (M94.262)             Insurance (Authorized # of Visits):  [facility]. Physician: Dr. Alessia Becerril  Next MD visit: none scheduled  Fall Risk: standard Manual:  STM to L knee   Tibiofemoral distraction - grd II   Post glide in flexed position - grd II Manual:  STM to L knee   Tibiofemoral distraction - grd II  Manual:  STM to L knee   Tibiofemoral distraction - grd II   Foam roll to lateral quad Manual:

## 2020-12-16 ENCOUNTER — OFFICE VISIT (OUTPATIENT)
Dept: PHYSICAL THERAPY | Facility: HOSPITAL | Age: 35
End: 2020-12-16
Attending: ORTHOPAEDIC SURGERY
Payer: COMMERCIAL

## 2020-12-16 VITALS — DIASTOLIC BLOOD PRESSURE: 97 MMHG | HEART RATE: 76 BPM | SYSTOLIC BLOOD PRESSURE: 134 MMHG

## 2020-12-16 PROCEDURE — 97110 THERAPEUTIC EXERCISES: CPT

## 2020-12-16 PROCEDURE — 97140 MANUAL THERAPY 1/> REGIONS: CPT

## 2020-12-17 NOTE — PROGRESS NOTES
Dx:  Left knee pain, unspecified chronicity (M25.562)  Chondromalacia of left knee (M94.262)             Insurance (Authorized # of Visits):  [address] [facility]. Physician: Dr. Pilar Shoemaker  Next MD visit: none scheduled  Fall Risk: standard Manual:  STM to L knee   Tibiofemoral distraction - grd II  Manual:  STM to L knee   Tibiofemoral distraction - grd II    TherEx:   SLR - 10   Heel slides with AA from sheet around foot - 8  Bridge with L leg flexed more than R leg - 2x10   Clams - 15 B

## 2020-12-23 ENCOUNTER — APPOINTMENT (OUTPATIENT)
Dept: PHYSICAL THERAPY | Facility: HOSPITAL | Age: 35
End: 2020-12-23
Attending: ORTHOPAEDIC SURGERY
Payer: COMMERCIAL

## 2020-12-23 ENCOUNTER — TELEPHONE (OUTPATIENT)
Dept: PHYSICAL THERAPY | Facility: HOSPITAL | Age: 35
End: 2020-12-23

## 2020-12-30 ENCOUNTER — APPOINTMENT (OUTPATIENT)
Dept: PHYSICAL THERAPY | Facility: HOSPITAL | Age: 35
End: 2020-12-30
Attending: ORTHOPAEDIC SURGERY
Payer: COMMERCIAL

## 2020-12-30 ENCOUNTER — TELEPHONE (OUTPATIENT)
Dept: PHYSICAL THERAPY | Facility: HOSPITAL | Age: 35
End: 2020-12-30

## 2020-12-30 NOTE — TELEPHONE ENCOUNTER
Called pt due to no show this date. Informed pt about missed appt and that PT had other available appts later this afternoon if she would still like to come in.

## 2021-01-06 ENCOUNTER — APPOINTMENT (OUTPATIENT)
Dept: PHYSICAL THERAPY | Facility: HOSPITAL | Age: 36
End: 2021-01-06
Attending: ORTHOPAEDIC SURGERY
Payer: COMMERCIAL

## 2021-01-07 ENCOUNTER — TELEPHONE (OUTPATIENT)
Dept: PHYSICAL THERAPY | Facility: HOSPITAL | Age: 36
End: 2021-01-07

## 2021-01-08 ENCOUNTER — TELEPHONE (OUTPATIENT)
Dept: PHYSICAL THERAPY | Facility: HOSPITAL | Age: 36
End: 2021-01-08

## 2021-01-08 NOTE — TELEPHONE ENCOUNTER
Called and LM for pt due to no show on 1/6. Informed pt about attendance policy and that we would cancel rest of appts if she misses another visit.  Asked pt to call back to confirm next appt if she does plan to come or to call and cancel rest of appts if s

## 2021-01-13 ENCOUNTER — OFFICE VISIT (OUTPATIENT)
Dept: PHYSICAL THERAPY | Facility: HOSPITAL | Age: 36
End: 2021-01-13
Attending: ORTHOPAEDIC SURGERY
Payer: COMMERCIAL

## 2021-01-13 VITALS — SYSTOLIC BLOOD PRESSURE: 135 MMHG | DIASTOLIC BLOOD PRESSURE: 92 MMHG | HEART RATE: 95 BPM

## 2021-01-13 DIAGNOSIS — M94.262 CHONDROMALACIA OF LEFT KNEE: ICD-10-CM

## 2021-01-13 DIAGNOSIS — M25.562 LEFT KNEE PAIN, UNSPECIFIED CHRONICITY: ICD-10-CM

## 2021-01-13 PROCEDURE — 97140 MANUAL THERAPY 1/> REGIONS: CPT

## 2021-01-13 PROCEDURE — 97110 THERAPEUTIC EXERCISES: CPT

## 2021-01-14 NOTE — PROGRESS NOTES
Dx:  Left knee pain, unspecified chronicity (M25.562)  Chondromalacia of left knee (M94.262)             Insurance (Authorized # of Visits):  [address] [place] [address]. Physician: Dr. Eliezer Malloy MD visit: none scheduled  Fall Risk: standard 5 mins  Seated hip flexion - 2x10 B   Bridge with fitness ring - 2x10   LAQ's - 10  Leg press on shuttle (25#) - 15     TherEx:   Nustep, level 3 - 5 mins  Prone hip flex - 10   Prone hip ext with knee flexed - 10   Prone quad stretch with strap on L - 3x3

## 2021-01-20 ENCOUNTER — OFFICE VISIT (OUTPATIENT)
Dept: PHYSICAL THERAPY | Facility: HOSPITAL | Age: 36
End: 2021-01-20
Attending: ORTHOPAEDIC SURGERY
Payer: COMMERCIAL

## 2021-01-20 PROCEDURE — 97110 THERAPEUTIC EXERCISES: CPT

## 2021-01-20 PROCEDURE — 97140 MANUAL THERAPY 1/> REGIONS: CPT

## 2021-01-21 ENCOUNTER — TELEPHONE (OUTPATIENT)
Dept: PHYSICAL THERAPY | Facility: HOSPITAL | Age: 36
End: 2021-01-21

## 2021-01-21 NOTE — PROGRESS NOTES
Dx:  Left knee pain, unspecified chronicity (M25.562)  Chondromalacia of left knee (M94.262)             Insurance (Authorized # of Visits):  [address] [facility]. Physician: Dr. Gemma Nguyen  Next MD visit: none scheduled  Fall Risk: standard knee   Mobilization to lateral quad   TherEx:   Nustep, level 3 - 5 mins  Prone hip flex - 10   Prone hip ext with knee flexed - 10   Prone quad stretch with strap on L - 3x30\"  Upright bike - 2 mins TherEx:   Nustep, level 3 - 5 mins  Bridges - 15   Pron

## 2021-01-25 ENCOUNTER — OFFICE VISIT (OUTPATIENT)
Dept: PHYSICAL THERAPY | Facility: HOSPITAL | Age: 36
End: 2021-01-25
Attending: ORTHOPAEDIC SURGERY
Payer: COMMERCIAL

## 2021-01-25 PROCEDURE — 97110 THERAPEUTIC EXERCISES: CPT

## 2021-01-25 PROCEDURE — 97140 MANUAL THERAPY 1/> REGIONS: CPT

## 2021-01-25 NOTE — PROGRESS NOTES
Dx:  Left knee pain, unspecified chronicity (M25.562)  Chondromalacia of left knee (M94.262)             Insurance (Authorized # of Visits):  [address] [facility]. Physician: Dr. Markie Silva  Next MD visit: none scheduled  Fall Risk: standard lateral quad Manual:  STM to L knee   Mobilization to lateral quad Manual:  STM to L knee   Mobilization to lateral quad   TherEx:   Nustep, level 3 - 5 mins  Bridges - 15   Prone knee flex - 2x10   Prone hip ext with knee flexed - 2x10   Prone quad stretc

## 2021-02-01 ENCOUNTER — OFFICE VISIT (OUTPATIENT)
Dept: PHYSICAL THERAPY | Facility: HOSPITAL | Age: 36
End: 2021-02-01
Attending: ORTHOPAEDIC SURGERY
Payer: COMMERCIAL

## 2021-02-01 PROCEDURE — 97140 MANUAL THERAPY 1/> REGIONS: CPT

## 2021-02-01 PROCEDURE — 97110 THERAPEUTIC EXERCISES: CPT

## 2021-02-02 ENCOUNTER — APPOINTMENT (OUTPATIENT)
Dept: PHYSICAL THERAPY | Facility: HOSPITAL | Age: 36
End: 2021-02-02
Attending: ORTHOPAEDIC SURGERY
Payer: COMMERCIAL

## 2021-02-02 NOTE — PROGRESS NOTES
Dx:  Left knee pain, unspecified chronicity (M25.562)  Chondromalacia of left knee (M94.262)             Insurance (Authorized # of Visits):  [address] [facility]. Physician: Dr. Surendra Ham  Next MD visit: none scheduled  Fall Risk: standard green theraband around knees - 2x15   Marches supine - 10 B   Calf raises - 2x10   Leg press on shuttle (25#) - 2x15  TherEx:   Nustep, level 5 - 5 mins  Foam roller to middle and lateral quad  Bridges with fitness [place] around knees - 2x15   [employer]

## 2021-02-08 ENCOUNTER — OFFICE VISIT (OUTPATIENT)
Dept: PHYSICAL THERAPY | Facility: HOSPITAL | Age: 36
End: 2021-02-08
Attending: ORTHOPAEDIC SURGERY
Payer: COMMERCIAL

## 2021-02-08 PROCEDURE — 97140 MANUAL THERAPY 1/> REGIONS: CPT

## 2021-02-08 PROCEDURE — 97110 THERAPEUTIC EXERCISES: CPT

## 2021-02-09 NOTE — PROGRESS NOTES
Dx:  Left knee pain, unspecified chronicity (M25.562)  Chondromalacia of left knee (M94.262)             Insurance (Authorized # of Visits):  [address] [place] [address]. Physician: Dr. Corrinne Krone  Next MD visit: none scheduled  Fall Risk: standard TherEx:   Nustep, level 5 - 5 mins  Foam roller to middle and lateral quad  Bridges with fitness [place] around knees - 2x15   Marches standing - 10 B   Calf raises - 2x20   Leg press into theraball seated - 2x10, 5\" holds  TherEx:   Nustep, level 5 - 5

## 2021-02-15 ENCOUNTER — APPOINTMENT (OUTPATIENT)
Dept: PHYSICAL THERAPY | Facility: HOSPITAL | Age: 36
End: 2021-02-15
Attending: ORTHOPAEDIC SURGERY
Payer: COMMERCIAL

## 2021-02-17 ENCOUNTER — APPOINTMENT (OUTPATIENT)
Dept: PHYSICAL THERAPY | Facility: HOSPITAL | Age: 36
End: 2021-02-17
Attending: ORTHOPAEDIC SURGERY
Payer: COMMERCIAL

## 2021-02-18 ENCOUNTER — TELEPHONE (OUTPATIENT)
Dept: GASTROENTEROLOGY | Facility: CLINIC | Age: 36
End: 2021-02-18

## 2021-02-18 DIAGNOSIS — R93.3 IMAGING OF GASTROINTESTINAL TRACT ABNORMAL: ICD-10-CM

## 2021-02-18 DIAGNOSIS — D50.9 IRON DEFICIENCY ANEMIA, UNSPECIFIED IRON DEFICIENCY ANEMIA TYPE: Primary | ICD-10-CM

## 2021-02-18 NOTE — TELEPHONE ENCOUNTER
Last progress note received from Dr. Dannie Goldstein at [facility] [place] and placed on Dr. Amrik Quezada desk for review. Fax received from Dr. Bedoya Letters requesting referral for 02/22/2021 office visit. Referral pended. Please sign off if approved.  Than

## 2021-02-19 NOTE — TELEPHONE ENCOUNTER
Referral signed for patient to continue to see Dr. Michael Ferguson at [facility]. Has small bowel gastric metaplasia. Required surgery. GI Clinical Staff:   Referral signed for her to continue visits with Dr. Michael Ferguson, please send.  THx

## 2021-02-19 NOTE — TELEPHONE ENCOUNTER
Referral faxed to Dr. Cindy Roth at Fax # [phone] twice, but both faxes failed. I will try again on Monday when I return to office.

## 2021-02-22 ENCOUNTER — OFFICE VISIT (OUTPATIENT)
Dept: PHYSICAL THERAPY | Facility: HOSPITAL | Age: 36
End: 2021-02-22
Attending: ORTHOPAEDIC SURGERY
Payer: COMMERCIAL

## 2021-02-22 PROCEDURE — 97140 MANUAL THERAPY 1/> REGIONS: CPT

## 2021-02-22 PROCEDURE — 97110 THERAPEUTIC EXERCISES: CPT

## 2021-02-22 NOTE — TELEPHONE ENCOUNTER
I tried to fax the referral to Dr. Nikole Crawford again, but it failed. The fax number on file is [phone]. Will try to call office tomorrow at [phone] to see if there id an alternate fax number.

## 2021-02-23 NOTE — PROGRESS NOTES
Dx:  Left knee pain, unspecified chronicity (M25.562)  Chondromalacia of left knee (M94.262)             Insurance (Authorized # of Visits):  [address] [place] [address]. Physician: Dr. Edmar Mak  Next MD visit: none scheduled  Fall Risk: standard and lateral quad  SAQ's with bolster - 10 B   DKTC with theraball - 2x10  Marches standing - 10 B   Leg press into theraball seated - 2x10, 5\" holds   Lunges - 10 B  TherEx:   Nustep, level 5 - 5 mins  SAQ's with bolster - 2x10 B   DKTC with theraball - 2

## 2021-02-23 NOTE — TELEPHONE ENCOUNTER
I called [phone], was transferred to [facility] and spoke to Samara. He provided me with alternate fax which is [phone]. Referral faxed to above number as instructed by Samara with [facility].

## 2021-03-01 ENCOUNTER — OFFICE VISIT (OUTPATIENT)
Dept: PHYSICAL THERAPY | Facility: HOSPITAL | Age: 36
End: 2021-03-01
Attending: ORTHOPAEDIC SURGERY
Payer: COMMERCIAL

## 2021-03-01 PROCEDURE — 97110 THERAPEUTIC EXERCISES: CPT

## 2021-03-02 NOTE — PROGRESS NOTES
Sea  Pt has attended 7 visits in Physical Therapy.      Dx:  Left knee pain, unspecified chronicity (M25.562)  Chondromalacia of left knee (M94.262)             Insurance (Authorized # of Visits):  [address] [facility]. Physician: Pt will demonstrate decreased pain to <3/10 during functional tasks and ADL's.  - partially met    Pt will display improved gross strength of L LE to 4/5. - partially met   Pt will be able to perform squatting tasks with even weight-bearing through B LE's exercises performed this date    Charges:  TherEx - 3         Total Timed Treatment:  42 min  Total Treatment Time: 42 min

## 2021-04-01 ENCOUNTER — TELEPHONE (OUTPATIENT)
Dept: FAMILY MEDICINE CLINIC | Facility: CLINIC | Age: 36
End: 2021-04-01

## 2021-04-01 DIAGNOSIS — R92.8 ABNORMAL MRI, BREAST: Primary | ICD-10-CM

## 2021-04-01 NOTE — TELEPHONE ENCOUNTER
Per patient she called the central scheduling for her 6 months follow up for her US breast and they told her that the Order on file is too old. Please advise.

## 2021-04-02 ENCOUNTER — LAB ENCOUNTER (OUTPATIENT)
Dept: LAB | Facility: HOSPITAL | Age: 36
End: 2021-04-02
Attending: INTERNAL MEDICINE
Payer: COMMERCIAL

## 2021-04-02 ENCOUNTER — TELEPHONE (OUTPATIENT)
Dept: GASTROENTEROLOGY | Facility: CLINIC | Age: 36
End: 2021-04-02

## 2021-04-02 DIAGNOSIS — K63.9 SMALL BOWEL LESION: Primary | ICD-10-CM

## 2021-04-02 DIAGNOSIS — D50.8 OTHER IRON DEFICIENCY ANEMIA: ICD-10-CM

## 2021-04-02 PROCEDURE — 84466 ASSAY OF TRANSFERRIN: CPT

## 2021-04-02 PROCEDURE — 36415 COLL VENOUS BLD VENIPUNCTURE: CPT

## 2021-04-02 PROCEDURE — 85025 COMPLETE CBC W/AUTO DIFF WBC: CPT

## 2021-04-02 PROCEDURE — 83540 ASSAY OF IRON: CPT

## 2021-04-02 NOTE — TELEPHONE ENCOUNTER
Dr. Kody Lopez    Referral request received from the [place] [address]. Requesting Referral/Authorization for patient office visit.     CPT Code: 55332   Dx Code: k92.0, R93.3, D50.9    For Dr. Chris Meneses    TSN#[id number] Tax JY#[id number]    T

## 2021-04-05 ENCOUNTER — OFFICE VISIT (OUTPATIENT)
Dept: HEMATOLOGY/ONCOLOGY | Facility: HOSPITAL | Age: 36
End: 2021-04-05
Attending: INTERNAL MEDICINE
Payer: COMMERCIAL

## 2021-04-05 VITALS
SYSTOLIC BLOOD PRESSURE: 126 MMHG | OXYGEN SATURATION: 100 % | RESPIRATION RATE: 16 BRPM | HEART RATE: 77 BPM | WEIGHT: 171 LBS | BODY MASS INDEX: 25.33 KG/M2 | HEIGHT: 69 IN | DIASTOLIC BLOOD PRESSURE: 81 MMHG | TEMPERATURE: 97 F

## 2021-04-05 DIAGNOSIS — Q85.8 PEUTZ-JEGHERS SYNDROME (HCC): ICD-10-CM

## 2021-04-05 DIAGNOSIS — Z86.711 HISTORY OF PULMONARY EMBOLISM: ICD-10-CM

## 2021-04-05 DIAGNOSIS — D50.8 OTHER IRON DEFICIENCY ANEMIA: Primary | ICD-10-CM

## 2021-04-05 DIAGNOSIS — Z90.710 H/O: HYSTERECTOMY: ICD-10-CM

## 2021-04-05 PROCEDURE — 99214 OFFICE O/P EST MOD 30 MIN: CPT | Performed by: INTERNAL MEDICINE

## 2021-04-05 RX ORDER — PLECANATIDE 3 MG/1
1 TABLET ORAL DAILY
COMMUNITY
Start: 2021-03-26

## 2021-04-05 NOTE — PROGRESS NOTES
Cancer Center Progress Note    Patient Name: Ally Candelario   YOB: 1985   Medical Record Number: [id number]   Attending Physician: Rosalina Apodaca M.D.        Chief Complaint:  Iron deficiency anemia, menorrhagia, history of provoked pulmonary em 2020    based on workup, suspected diagnosis.  [facility] Jessie Griffith   • Pulmonary embolism [facility]) 2016   • Small bowel polyp 2020    MULTIPLE noted in small bowel on VCE> referred for enteroscopy       Past Surgical History:  Past Surgica Tobacco Use      Smoking status: Never Smoker      Smokeless tobacco: Never Used    Vaping Use      Vaping Use: Never used    Substance and Sexual Activity      Alcohol use: Yes        Comment: occ      Drug use: No      Sexual activity: Yes        Partner Hydrocort-Pramoxine, Perianal, 1-1 % External Foam, Place 1 applicator rectally 2 (two) times daily.  (Patient not taking: Reported on 12/8/2020 ), Disp: 1 Can, Rfl: 0    Allergies:  No Known Allergies     Review of Systems:  All other systems reviewed and deficiency/menorrhagia/microcytic anemia  --IV low molecular weight iron dextran received in April 2017 and August 2017 and January 2020  --Status post hysterectomy January 2018.   Chronic GI blood loss now following with gastroenterology [facility].

## 2021-04-07 ENCOUNTER — TELEPHONE (OUTPATIENT)
Dept: FAMILY MEDICINE CLINIC | Facility: CLINIC | Age: 36
End: 2021-04-07

## 2021-04-07 DIAGNOSIS — D50.8 OTHER IRON DEFICIENCY ANEMIA: Primary | ICD-10-CM

## 2021-04-07 DIAGNOSIS — Q85.8 PEUTZ-JEGHERS SYNDROME (HCC): ICD-10-CM

## 2021-04-07 DIAGNOSIS — Z90.710 H/O: HYSTERECTOMY: ICD-10-CM

## 2021-04-07 NOTE — TELEPHONE ENCOUNTER
Pt said she was seen by Venora Shone and he recommend she see Earle Peters.  Pt requests referral. Please advise

## 2021-04-08 NOTE — TELEPHONE ENCOUNTER
Yes, okay for referral for Dr. Linn FREIRE of  Surgery. I have signed order. Patient with very rare small bowel gastric metaplasia disorder.

## 2021-04-12 ENCOUNTER — TELEPHONE (OUTPATIENT)
Dept: CASE MANAGEMENT | Age: 36
End: 2021-04-12

## 2021-04-12 DIAGNOSIS — Q85.8 PEUTZ-JEGHERS SYNDROME (HCC): Primary | ICD-10-CM

## 2021-04-12 NOTE — TELEPHONE ENCOUNTER
Alexandru Monzon,    Referral request received from the [address] [place] [address]     Requesting Referral/Authorization for patient office visit.     CPT Code: 36736   Dx Code: k92.0, R93.3, D50.9     For Dr. Katia Montenegro    Thank you  Stevenson Farias

## 2021-04-19 ENCOUNTER — HOSPITAL ENCOUNTER (OUTPATIENT)
Dept: ULTRASOUND IMAGING | Facility: HOSPITAL | Age: 36
Discharge: HOME | End: 2021-04-19
Attending: FAMILY MEDICINE
Payer: COMMERCIAL

## 2021-04-19 DIAGNOSIS — R92.8 ABNORMAL MRI, BREAST: ICD-10-CM

## 2021-04-19 PROCEDURE — 76642 ULTRASOUND BREAST LIMITED: CPT | Performed by: FAMILY MEDICINE

## 2021-07-27 ENCOUNTER — TELEPHONE (OUTPATIENT)
Dept: FAMILY MEDICINE CLINIC | Facility: CLINIC | Age: 36
End: 2021-07-27

## 2021-07-27 ENCOUNTER — PATIENT MESSAGE (OUTPATIENT)
Dept: FAMILY MEDICINE CLINIC | Facility: CLINIC | Age: 36
End: 2021-07-27

## 2021-07-27 NOTE — TELEPHONE ENCOUNTER
Pt states she had a hysterectomy 3 years ago. A year after surgery, facial hair came in, started plucking and waxing facial hair. Only medication is Trulance. No supplements. Facial hair is under chin and noticed more hair to arms and legs.   Denies othe

## 2021-07-27 NOTE — TELEPHONE ENCOUNTER
----- Message from [facility]. Jim Minoo sent at 7/27/2021  4:06 PM CDT -----  Regarding: Non-Urgent Medical Question  Contact: [phone]  Hi Dr. Sina Covington,    I am not sure if this question requires an e visit or in person visit.  Since my hysterectomy I've b

## 2021-07-27 NOTE — TELEPHONE ENCOUNTER
From: Erich Anthony  To: Dominga Lyons MD  Sent: 7/27/2021 4:06 PM CDT  Subject: Non-Urgent Medical Question    Hi Dr. Roselia Denton,    I am not sure if this question requires an e visit or in person visit.  Since my hysterectomy I've been getting a lot of f

## 2021-07-30 ENCOUNTER — TELEMEDICINE (OUTPATIENT)
Dept: FAMILY MEDICINE CLINIC | Facility: CLINIC | Age: 36
End: 2021-07-30

## 2021-07-30 DIAGNOSIS — L67.8 ABNORMAL FACIAL HAIR: Primary | ICD-10-CM

## 2021-07-30 DIAGNOSIS — D50.8 OTHER IRON DEFICIENCY ANEMIA: ICD-10-CM

## 2021-07-30 DIAGNOSIS — K59.09 CHRONIC CONSTIPATION: ICD-10-CM

## 2021-07-30 PROCEDURE — 99214 OFFICE O/P EST MOD 30 MIN: CPT | Performed by: FAMILY MEDICINE

## 2021-07-30 NOTE — PROGRESS NOTES
HPI:    Earleen Mcburney is a 30year old female presents for video visit for follow-up. Patient reports that after the hysterectomy, she developed an increase in facial hair growth on her cheeks and chin and on her upper lip.   Tries to tweeze here every Diabetes Paternal Grandfather    • Hypertension Paternal Grandfather    • Breast Cancer Maternal Grandmother         in her 42's   • Breast Cancer Other         Paternal Uncle breast cancer 52's   • Glaucoma Neg    • Macular degeneration Neg       Social H for sufficient and adequate time. This billing was spent on reviewing labs, medications, radiology tests and decision making. Appropriate medical decision-making and tests are ordered as detailed in the plan of care above.            Responsible party/pat

## 2021-08-03 ENCOUNTER — TELEPHONE (OUTPATIENT)
Dept: HEMATOLOGY/ONCOLOGY | Facility: HOSPITAL | Age: 36
End: 2021-08-03

## 2021-08-03 NOTE — TELEPHONE ENCOUNTER
Ervin from [facility] called and wanted to ask Dr. Diana Jarvis if he really wanted secretin with this MRI. Secretin is more for a pancreatic function test.     Please call Ervin [phone] to clarify. Thank you.

## 2021-08-09 ENCOUNTER — HOSPITAL ENCOUNTER (OUTPATIENT)
Dept: MRI IMAGING | Facility: HOSPITAL | Age: 36
Discharge: HOME | End: 2021-08-09
Attending: INTERNAL MEDICINE
Payer: COMMERCIAL

## 2021-08-09 DIAGNOSIS — D50.8 OTHER IRON DEFICIENCY ANEMIA: ICD-10-CM

## 2021-08-09 DIAGNOSIS — Q85.8 PEUTZ-JEGHERS SYNDROME (HCC): ICD-10-CM

## 2021-08-09 PROCEDURE — A9575 INJ GADOTERATE MEGLUMI 0.1ML: HCPCS | Performed by: INTERNAL MEDICINE

## 2021-08-09 PROCEDURE — 74183 MRI ABD W/O CNTR FLWD CNTR: CPT | Performed by: INTERNAL MEDICINE

## 2021-09-09 ENCOUNTER — NURSE TRIAGE (OUTPATIENT)
Dept: FAMILY MEDICINE CLINIC | Facility: CLINIC | Age: 36
End: 2021-09-09

## 2021-09-09 LAB — AMB EXT COVID-19 RESULT: DETECTED

## 2021-09-09 NOTE — TELEPHONE ENCOUNTER
Keep me posted. Excellent work Estrada Brewer, that must have been a stressful call and you did all of the right things. Thank you!

## 2021-09-09 NOTE — TELEPHONE ENCOUNTER
.Action Requested: Summary for Provider     []  Critical Lab, Recommendations Needed  [] Need Additional Advice  []   FYI    []   Need Orders  [] Need Medications Sent to Pharmacy  []  Other     SUMMARY:    911 was called    The patient stated she was expo

## 2021-09-13 NOTE — TELEPHONE ENCOUNTER
Virtual appointment made for tomorrow 9/14/21  Future Appointments   Date Time Provider Razia Irizarry   9/14/2021  2:30 PM Sourav Crump MD [address] [place] [address]   9/15/2021  3:00 PM Jak Hammond MD [facility]   9/20/2021  9:00

## 2021-09-14 ENCOUNTER — TELEPHONE (OUTPATIENT)
Dept: FAMILY MEDICINE CLINIC | Facility: CLINIC | Age: 36
End: 2021-09-14

## 2021-09-14 ENCOUNTER — APPOINTMENT (OUTPATIENT)
Dept: CT IMAGING | Facility: HOSPITAL | Age: 36
End: 2021-09-14
Attending: EMERGENCY MEDICINE
Payer: COMMERCIAL

## 2021-09-14 ENCOUNTER — APPOINTMENT (OUTPATIENT)
Dept: GENERAL RADIOLOGY | Facility: HOSPITAL | Age: 36
End: 2021-09-14
Attending: EMERGENCY MEDICINE
Payer: COMMERCIAL

## 2021-09-14 ENCOUNTER — HOSPITAL ENCOUNTER (EMERGENCY)
Facility: HOSPITAL | Age: 36
Discharge: HOME | End: 2021-09-14
Attending: EMERGENCY MEDICINE
Payer: COMMERCIAL

## 2021-09-14 ENCOUNTER — TELEMEDICINE (OUTPATIENT)
Dept: FAMILY MEDICINE CLINIC | Facility: CLINIC | Age: 36
End: 2021-09-14

## 2021-09-14 ENCOUNTER — NURSE ONLY (OUTPATIENT)
Dept: INFUSION CENTER | Age: 36
End: 2021-09-14
Attending: FAMILY MEDICINE
Payer: COMMERCIAL

## 2021-09-14 VITALS
TEMPERATURE: 99 F | OXYGEN SATURATION: 96 % | HEART RATE: 106 BPM | WEIGHT: 170 LBS | RESPIRATION RATE: 16 BRPM | DIASTOLIC BLOOD PRESSURE: 88 MMHG | BODY MASS INDEX: 25 KG/M2 | SYSTOLIC BLOOD PRESSURE: 123 MMHG

## 2021-09-14 DIAGNOSIS — U07.1 COVID-19: Primary | ICD-10-CM

## 2021-09-14 DIAGNOSIS — J45.901 EXACERBATION OF ASTHMA, UNSPECIFIED ASTHMA SEVERITY, UNSPECIFIED WHETHER PERSISTENT: ICD-10-CM

## 2021-09-14 LAB
ANION GAP SERPL CALC-SCNC: 5 MMOL/L (ref 0–18)
B-HCG UR QL: NEGATIVE
BILIRUB UR QL: NEGATIVE
BUN BLD-MCNC: 5 MG/DL (ref 7–18)
BUN/CREAT SERPL: 6.6 (ref 10–20)
CALCIUM BLD-MCNC: 8.8 MG/DL (ref 8.5–10.1)
CHLORIDE SERPL-SCNC: 107 MMOL/L (ref 98–112)
CO2 SERPL-SCNC: 25 MMOL/L (ref 21–32)
COLOR UR: YELLOW
CREAT BLD-MCNC: 0.76 MG/DL
D DIMER PPP FEU-MCNC: 0.94 UG/ML FEU (ref ?–0.5)
GLUCOSE BLD-MCNC: 86 MG/DL (ref 70–99)
GLUCOSE UR-MCNC: NEGATIVE MG/DL
HGB UR QL STRIP.AUTO: NEGATIVE
KETONES UR-MCNC: NEGATIVE MG/DL
LEUKOCYTE ESTERASE UR QL STRIP.AUTO: NEGATIVE
NITRITE UR QL STRIP.AUTO: NEGATIVE
OSMOLALITY SERPL CALC.SUM OF ELEC: 281 MOSM/KG (ref 275–295)
PH UR: 8 [PH] (ref 5–8)
POTASSIUM SERPL-SCNC: 3.8 MMOL/L (ref 3.5–5.1)
PROT UR-MCNC: NEGATIVE MG/DL
SODIUM SERPL-SCNC: 137 MMOL/L (ref 136–145)
SP GR UR STRIP: 1 (ref 1–1.03)
TROPONIN I SERPL-MCNC: <0.045 NG/ML (ref ?–0.04)
UROBILINOGEN UR STRIP-ACNC: <2

## 2021-09-14 PROCEDURE — 96365 THER/PROPH/DIAG IV INF INIT: CPT

## 2021-09-14 PROCEDURE — 96372 THER/PROPH/DIAG INJ SC/IM: CPT

## 2021-09-14 PROCEDURE — 85379 FIBRIN DEGRADATION QUANT: CPT | Performed by: EMERGENCY MEDICINE

## 2021-09-14 PROCEDURE — 80048 BASIC METABOLIC PNL TOTAL CA: CPT | Performed by: EMERGENCY MEDICINE

## 2021-09-14 PROCEDURE — 99214 OFFICE O/P EST MOD 30 MIN: CPT | Performed by: FAMILY MEDICINE

## 2021-09-14 PROCEDURE — 96375 TX/PRO/DX INJ NEW DRUG ADDON: CPT

## 2021-09-14 PROCEDURE — 94640 AIRWAY INHALATION TREATMENT: CPT

## 2021-09-14 PROCEDURE — 99285 EMERGENCY DEPT VISIT HI MDM: CPT

## 2021-09-14 PROCEDURE — 81003 URINALYSIS AUTO W/O SCOPE: CPT | Performed by: EMERGENCY MEDICINE

## 2021-09-14 PROCEDURE — 84484 ASSAY OF TROPONIN QUANT: CPT | Performed by: EMERGENCY MEDICINE

## 2021-09-14 PROCEDURE — 96366 THER/PROPH/DIAG IV INF ADDON: CPT

## 2021-09-14 PROCEDURE — 81025 URINE PREGNANCY TEST: CPT

## 2021-09-14 PROCEDURE — 85060 BLOOD SMEAR INTERPRETATION: CPT | Performed by: EMERGENCY MEDICINE

## 2021-09-14 PROCEDURE — 71260 CT THORAX DX C+: CPT | Performed by: EMERGENCY MEDICINE

## 2021-09-14 PROCEDURE — 85025 COMPLETE CBC W/AUTO DIFF WBC: CPT | Performed by: EMERGENCY MEDICINE

## 2021-09-14 PROCEDURE — 71045 X-RAY EXAM CHEST 1 VIEW: CPT | Performed by: EMERGENCY MEDICINE

## 2021-09-14 RX ORDER — KETOROLAC TROMETHAMINE 15 MG/ML
15 INJECTION, SOLUTION INTRAMUSCULAR; INTRAVENOUS ONCE
Status: COMPLETED | OUTPATIENT
Start: 2021-09-14 | End: 2021-09-14

## 2021-09-14 RX ORDER — ORPHENADRINE CITRATE 30 MG/ML
30 INJECTION INTRAMUSCULAR; INTRAVENOUS ONCE
Status: COMPLETED | OUTPATIENT
Start: 2021-09-14 | End: 2021-09-14

## 2021-09-14 RX ORDER — BENZONATATE 200 MG/1
200 CAPSULE ORAL 3 TIMES DAILY PRN
Qty: 21 CAPSULE | Refills: 0 | Status: SHIPPED | OUTPATIENT
Start: 2021-09-14 | End: 2021-09-21

## 2021-09-14 RX ORDER — ALBUTEROL SULFATE 90 UG/1
2 AEROSOL, METERED RESPIRATORY (INHALATION) EVERY 4 HOURS PRN
Qty: 18 G | Refills: 0 | Status: SHIPPED | OUTPATIENT
Start: 2021-09-14 | End: 2021-10-14

## 2021-09-14 RX ORDER — ALBUTEROL SULFATE 90 UG/1
4 AEROSOL, METERED RESPIRATORY (INHALATION) ONCE
Status: COMPLETED | OUTPATIENT
Start: 2021-09-14 | End: 2021-09-14

## 2021-09-14 RX ORDER — MAGNESIUM SULFATE HEPTAHYDRATE 40 MG/ML
2 INJECTION, SOLUTION INTRAVENOUS ONCE
Status: COMPLETED | OUTPATIENT
Start: 2021-09-14 | End: 2021-09-14

## 2021-09-14 RX ORDER — TERBUTALINE SULFATE 1 MG/ML
0.25 INJECTION, SOLUTION SUBCUTANEOUS ONCE
Status: COMPLETED | OUTPATIENT
Start: 2021-09-14 | End: 2021-09-14

## 2021-09-14 NOTE — PROGRESS NOTES
1725    Pt brought back to room #5. Pt SOB, tachypnic, able to speak in 1-2 words. Pt with frequent cough worse with attempting to speak. Pt with audible wheezing. States was in ER 9/8 and discharged home. States not feeling any better.  States hx of PE wit

## 2021-09-14 NOTE — TELEPHONE ENCOUNTER
Contacted patient per Dr. Dian Kingston to help patient with monoclonal infusion antibody scheduling. Per patient, she is scheduled for tomorrow at 8 a.m. Per patient, she was offered an appointment for today at 5 p.m.    However she is having transportation

## 2021-09-14 NOTE — ED INITIAL ASSESSMENT (HPI)
Pt to ED sent from [facility] for antibody infusion. Pt tested positive for covid last week Wednesday. Pt having difficulty speaking in triage d/t coughing. Pt reports SOB, denies chest pain. Labored breathing noted with wheezing.  Hx of PE.

## 2021-09-14 NOTE — PROGRESS NOTES
HPI:    Ally Candelario is a 29year old female presents for video visit for ER follow-up. On 9/9, patient was experiencing a cough, sore throat, difficulty breathing-went to [facility] ER in [facility]. Diagnosed with Covid.   Discharged wit Paternal Uncle breast cancer 52's   • Glaucoma Neg    • Macular degeneration Neg       Social History: Social History    Tobacco Use      Smoking status: Never Smoker      Smokeless tobacco: Never Used    Vaping Use      Vaping Use: Never used    Alcohol adequate time. This billing was spent on reviewing labs, medications, radiology tests and decision making. Appropriate medical decision-making and tests are ordered as detailed in the plan of care above.        Responsible party/patient verbalized underst

## 2021-09-15 ENCOUNTER — TELEPHONE (OUTPATIENT)
Dept: CASE MANAGEMENT | Age: 36
End: 2021-09-15

## 2021-09-15 LAB
BASOPHILS # BLD AUTO: 0.02 X10(3) UL (ref 0–0.2)
BASOPHILS NFR BLD AUTO: 0.3 %
DEPRECATED RDW RBC AUTO: 37.8 FL (ref 35.1–46.3)
EOSINOPHIL # BLD AUTO: 0.02 X10(3) UL (ref 0–0.7)
EOSINOPHIL NFR BLD AUTO: 0.3 %
ERYTHROCYTE [DISTWIDTH] IN BLOOD BY AUTOMATED COUNT: 15.3 % (ref 11–15)
HCT VFR BLD AUTO: 41.8 %
HGB BLD-MCNC: 13.1 G/DL
IMM GRANULOCYTES # BLD AUTO: 0.07 X10(3) UL (ref 0–1)
IMM GRANULOCYTES NFR BLD: 1.2 %
LYMPHOCYTES # BLD AUTO: 2.17 X10(3) UL (ref 1–4)
LYMPHOCYTES NFR BLD AUTO: 37.3 %
MCH RBC QN AUTO: 21.9 PG (ref 26–34)
MCHC RBC AUTO-ENTMCNC: 31.3 G/DL (ref 31–37)
MCV RBC AUTO: 70 FL
MONOCYTES # BLD AUTO: 0.33 X10(3) UL (ref 0.1–1)
MONOCYTES NFR BLD AUTO: 5.7 %
NEUTROPHILS # BLD AUTO: 3.21 X10 (3) UL (ref 1.5–7.7)
NEUTROPHILS # BLD AUTO: 3.21 X10(3) UL (ref 1.5–7.7)
NEUTROPHILS NFR BLD AUTO: 55.2 %
PLATELET # BLD AUTO: 253 10(3)UL (ref 150–450)
RBC # BLD AUTO: 5.97 X10(6)UL
WBC # BLD AUTO: 5.8 X10(3) UL (ref 4–11)

## 2021-09-15 NOTE — TELEPHONE ENCOUNTER
Pt received PAB infusion at [address] [place] [address] on 9/14/21 for COVID-19. Please follow-up with pt for post-infusion assessment and home monitoring if needed. Thank you.

## 2021-09-15 NOTE — TELEPHONE ENCOUNTER
Home Monitoring Day 6 of 7.    RN call attempt to assess for PAB adverse effects. And for home monitoring. Left detailed Voicemail to call back our office. Phone number provided with office hours. MyChart also sent.

## 2021-09-15 NOTE — ED PROVIDER NOTES
Patient Seen in: [facility] AND [facility] Emergency Department    History   Patient presents with:  Difficulty Breathing    Stated Complaint: covid infusion      HPI    50-year-old female with paste medical history of asthma, prior history of pulmonary embolism p • Heart Disorder Mother    • Cancer Mother         liver cancer   • Diabetes Paternal Grandfather    • Hypertension Paternal Grandfather    • Breast Cancer Maternal Grandmother         in her 42's   • Breast Cancer Other         Paternal Uncle breast can Abnormal; Notable for the following components:    BUN 5 (*)     BUN/CREA Ratio 6.6 (*)     All other components within normal limits   D-DIMER - Abnormal; Notable for the following components:    D-Dimer 0.94 (*)     All other components within normal perez frequent handwashing) according to CDC guidelines. \"    XR CHEST AP PORTABLE  (CPT=71045)    Result Date: 9/14/2021  PROCEDURE: XR CHEST AP PORTABLE  (CPT=71045) TIME: 1913 hours.    COMPARISON: [facility], X CHEST PA LAT ROUTINE, 9/23/2016, compromised by patient motion artifact. FINDINGS:  CARDIAC: No enlargement, pericardial thickening, or significant calcification. No CT evidence for acute right heart strain. RV:  LV ratio is maintained. MEDIASTINUM/IVETTE: No mass or enlarged adenopathy. bilateral ground-glass and consolidative lower lobe opacities consistent with multifocal pneumonia/pneumonitis from patient's known diagnosis of COVID-19. Right hilar adenopathy is likely reactive. 3. Trace bilateral pleural effusions.   4. Lesser incide Clinical Impression:  COVID-19  (primary encounter diagnosis)  Exacerbation of asthma, unspecified asthma severity, unspecified whether persistent    Disposition:  Discharge    Follow-up:  Ankush Pineda MD  [address] [place] [address]

## 2021-09-16 NOTE — TELEPHONE ENCOUNTER
Home Monitoring Day 7 of 7. Infection banner added. ER f/u virtual appt scheduled for tomorrow with Dr Alexander Richardson; setting for next follow up call on 9/18/21. What  was your temp today?  Not checked; advised to check twice daily to help determine when meets

## 2021-09-17 ENCOUNTER — TELEMEDICINE (OUTPATIENT)
Dept: FAMILY MEDICINE CLINIC | Facility: CLINIC | Age: 36
End: 2021-09-17

## 2021-09-17 DIAGNOSIS — R11.0 NAUSEA: ICD-10-CM

## 2021-09-17 DIAGNOSIS — U07.1 COVID-19 VIRUS INFECTION: Primary | ICD-10-CM

## 2021-09-17 DIAGNOSIS — R05.9 COUGH: ICD-10-CM

## 2021-09-17 PROCEDURE — 99213 OFFICE O/P EST LOW 20 MIN: CPT | Performed by: STUDENT IN AN ORGANIZED HEALTH CARE EDUCATION/TRAINING PROGRAM

## 2021-09-17 RX ORDER — AZITHROMYCIN 250 MG/1
TABLET, FILM COATED ORAL
Qty: 6 TABLET | Refills: 0 | Status: SHIPPED | OUTPATIENT
Start: 2021-09-17 | End: 2021-09-22

## 2021-09-17 RX ORDER — CODEINE PHOSPHATE AND GUAIFENESIN 10; 100 MG/5ML; MG/5ML
5 SOLUTION ORAL EVERY 6 HOURS PRN
Qty: 140 ML | Refills: 0 | Status: SHIPPED | OUTPATIENT
Start: 2021-09-17 | End: 2022-01-26 | Stop reason: ALTCHOICE

## 2021-09-17 RX ORDER — ONDANSETRON HYDROCHLORIDE 8 MG/1
8 TABLET, FILM COATED ORAL EVERY 8 HOURS PRN
Qty: 30 TABLET | Refills: 0 | Status: SHIPPED | OUTPATIENT
Start: 2021-09-17 | End: 2022-01-26 | Stop reason: ALTCHOICE

## 2021-09-17 NOTE — PROGRESS NOTES
Virtual Telephone Check-In    Kirk Stiles verbally consents to a Virtual/Telephone Check-In visit on 09/17/21. Patient has been referred to the [facility] website at [email or web address] to review the yearly Consent to Treat document.     Patient underst plan of care above. Coding/billing information is submitted for this visit based on complexity of care and/or time spent for the visit. HPI:    Patient ID: Earleen Mcburney is a 29year old female.     HPI  Pt presenting via video visit for COVID follow- Physical Exam   Vitals signs taken at home if available:     Limited examination for this telephone visit due to the coronavirus emergency     Patient was speaking in complete sentences, no increased work of breathing and very coherent and alert on the p plan.    No orders of the defined types were placed in this encounter.       Meds This Visit:  Requested Prescriptions     Signed Prescriptions Disp Refills   • azithromycin 250 MG Oral Tab 6 tablet 0     Sig: Take 2 tablets (500 mg total) by mouth daily fo

## 2021-09-18 NOTE — TELEPHONE ENCOUNTER
Discontinuing Home Monitoring as it is day 7 of 7 and no note in Telemedicine Visit to continue Home Monitoring.

## 2021-09-20 ENCOUNTER — APPOINTMENT (OUTPATIENT)
Dept: HEMATOLOGY/ONCOLOGY | Facility: HOSPITAL | Age: 36
End: 2021-09-20
Attending: INTERNAL MEDICINE
Payer: COMMERCIAL

## 2021-09-21 ENCOUNTER — TELEMEDICINE (OUTPATIENT)
Dept: FAMILY MEDICINE CLINIC | Facility: CLINIC | Age: 36
End: 2021-09-21

## 2021-09-21 DIAGNOSIS — R05.3 PERSISTENT COUGH: ICD-10-CM

## 2021-09-21 DIAGNOSIS — U07.1 COVID-19: Primary | ICD-10-CM

## 2021-09-21 PROCEDURE — 99214 OFFICE O/P EST MOD 30 MIN: CPT | Performed by: FAMILY MEDICINE

## 2021-09-21 NOTE — PROGRESS NOTES
HPI:    Daryn Gonzalez is a 29year old female presents for video visit for follow-up. Was diagnosed with COVID-19 on 9/9, received monoclonal antibody therapy on 9/14. Since then, patient has been slowly improving.   Still coughing, but breathing more breast cancer 50's   • Glaucoma Neg    • Macular degeneration Neg       Social History: Social History    Tobacco Use      Smoking status: Never Smoker      Smokeless tobacco: Never Used    Vaping Use      Vaping Use: Never used    Alcohol use: Yes      Co improvement since last week, was also prescribed azithromycin last week by Dr. Alon Villalta. Continue supportive care advised. Follow-up on 9/23 for potential clearance to return to work.     Please note that the following visit was completed using two-way, re

## 2021-09-23 ENCOUNTER — HOSPITAL ENCOUNTER (EMERGENCY)
Facility: HOSPITAL | Age: 36
Discharge: HOME | End: 2021-09-23
Payer: COMMERCIAL

## 2021-09-23 ENCOUNTER — NURSE TRIAGE (OUTPATIENT)
Dept: FAMILY MEDICINE CLINIC | Facility: CLINIC | Age: 36
End: 2021-09-23

## 2021-09-23 ENCOUNTER — APPOINTMENT (OUTPATIENT)
Dept: GENERAL RADIOLOGY | Facility: HOSPITAL | Age: 36
End: 2021-09-23
Payer: COMMERCIAL

## 2021-09-23 VITALS
WEIGHT: 160 LBS | RESPIRATION RATE: 16 BRPM | OXYGEN SATURATION: 100 % | TEMPERATURE: 98 F | HEART RATE: 74 BPM | BODY MASS INDEX: 24 KG/M2 | DIASTOLIC BLOOD PRESSURE: 74 MMHG | SYSTOLIC BLOOD PRESSURE: 114 MMHG

## 2021-09-23 DIAGNOSIS — U07.1 COVID-19: Primary | ICD-10-CM

## 2021-09-23 DIAGNOSIS — R05.9 COUGH: ICD-10-CM

## 2021-09-23 LAB
ANION GAP SERPL CALC-SCNC: 5 MMOL/L (ref 0–18)
BASOPHILS # BLD AUTO: 0.03 X10(3) UL (ref 0–0.2)
BASOPHILS NFR BLD AUTO: 0.4 %
BUN BLD-MCNC: 4 MG/DL (ref 7–18)
BUN/CREAT SERPL: 6.3 (ref 10–20)
CALCIUM BLD-MCNC: 8.9 MG/DL (ref 8.5–10.1)
CHLORIDE SERPL-SCNC: 108 MMOL/L (ref 98–112)
CO2 SERPL-SCNC: 27 MMOL/L (ref 21–32)
CREAT BLD-MCNC: 0.64 MG/DL
DEPRECATED RDW RBC AUTO: 38.5 FL (ref 35.1–46.3)
EOSINOPHIL # BLD AUTO: 0.18 X10(3) UL (ref 0–0.7)
EOSINOPHIL NFR BLD AUTO: 2.2 %
ERYTHROCYTE [DISTWIDTH] IN BLOOD BY AUTOMATED COUNT: 15.3 % (ref 11–15)
GLUCOSE BLD-MCNC: 85 MG/DL (ref 70–99)
HCT VFR BLD AUTO: 36.9 %
HGB BLD-MCNC: 11.3 G/DL
IMM GRANULOCYTES # BLD AUTO: 0.05 X10(3) UL (ref 0–1)
IMM GRANULOCYTES NFR BLD: 0.6 %
LYMPHOCYTES # BLD AUTO: 2.25 X10(3) UL (ref 1–4)
LYMPHOCYTES NFR BLD AUTO: 27.5 %
MCH RBC QN AUTO: 21.8 PG (ref 26–34)
MCHC RBC AUTO-ENTMCNC: 30.6 G/DL (ref 31–37)
MCV RBC AUTO: 71.1 FL
MONOCYTES # BLD AUTO: 0.6 X10(3) UL (ref 0.1–1)
MONOCYTES NFR BLD AUTO: 7.3 %
NEUTROPHILS # BLD AUTO: 5.08 X10 (3) UL (ref 1.5–7.7)
NEUTROPHILS # BLD AUTO: 5.08 X10(3) UL (ref 1.5–7.7)
NEUTROPHILS NFR BLD AUTO: 62 %
OSMOLALITY SERPL CALC.SUM OF ELEC: 286 MOSM/KG (ref 275–295)
PLATELET # BLD AUTO: 361 10(3)UL (ref 150–450)
POTASSIUM SERPL-SCNC: 4.6 MMOL/L (ref 3.5–5.1)
RBC # BLD AUTO: 5.19 X10(6)UL
SODIUM SERPL-SCNC: 140 MMOL/L (ref 136–145)
WBC # BLD AUTO: 8.2 X10(3) UL (ref 4–11)

## 2021-09-23 PROCEDURE — 96361 HYDRATE IV INFUSION ADD-ON: CPT

## 2021-09-23 PROCEDURE — 85025 COMPLETE CBC W/AUTO DIFF WBC: CPT | Performed by: NURSE PRACTITIONER

## 2021-09-23 PROCEDURE — 80048 BASIC METABOLIC PNL TOTAL CA: CPT | Performed by: NURSE PRACTITIONER

## 2021-09-23 PROCEDURE — 71045 X-RAY EXAM CHEST 1 VIEW: CPT

## 2021-09-23 PROCEDURE — 99284 EMERGENCY DEPT VISIT MOD MDM: CPT

## 2021-09-23 PROCEDURE — 96374 THER/PROPH/DIAG INJ IV PUSH: CPT

## 2021-09-23 PROCEDURE — 94640 AIRWAY INHALATION TREATMENT: CPT

## 2021-09-23 RX ORDER — KETOROLAC TROMETHAMINE 30 MG/ML
30 INJECTION, SOLUTION INTRAMUSCULAR; INTRAVENOUS ONCE
Status: COMPLETED | OUTPATIENT
Start: 2021-09-23 | End: 2021-09-23

## 2021-09-23 RX ORDER — ALBUTEROL SULFATE 90 UG/1
2 AEROSOL, METERED RESPIRATORY (INHALATION) ONCE
Status: COMPLETED | OUTPATIENT
Start: 2021-09-23 | End: 2021-09-23

## 2021-09-23 NOTE — ED INITIAL ASSESSMENT (HPI)
Patient complains of cough since testing positive for covid a fortnight ago, states the cough got worse

## 2021-09-23 NOTE — ED PROVIDER NOTES
Patient Seen in: [facility] AND [facility] Emergency Department      History   Patient presents with:  Covid    Stated Complaint: cough, sob Covid + 09/09    Subjective:   HPI    81-year-old female presents the emergency department for evaluation.  Patient states resection for bowel obstruction-one month after colon mass removed.    • TONSILLECTOMY     • TOTAL ABDOMINAL HYSTERECTOMY N/A 1/4/2018    Procedure: ABDOMINAL HYSTERECTOMY;  Surgeon: Denise Call MD;  Location: [address] [place] [address] back: Normal range of motion and neck supple. Skin:     General: Skin is warm and dry. Capillary Refill: Capillary refill takes less than 2 seconds. Neurological:      General: No focal deficit present.       Mental Status: She is alert and oriente XR CHEST AP PORTABLE  (CPT=71045)    Result Date: 9/14/2021  CONCLUSION:   Mildly hypoexpanded lungs bilaterally. Patchy infrahilar ground-glass opacities are probably related to patient's known diagnosis of COVID-19.   Negative for pleural effusion or to use a humidifier at home. She was encouraged to call her primary to be seen in the next 2 to 3 days for follow-up.                            Disposition and Plan     Clinical Impression:  [id number]  (primary encounter diagnosis)  Cough     Disposition:

## 2021-09-23 NOTE — TELEPHONE ENCOUNTER
Action Requested: Summary for Provider     []  Critical Lab, Recommendations Needed  [] Need Additional Advice  [x]   FYI    []   Need Orders  [] Need Medications Sent to Pharmacy  []  Other     SUMMARY: Patient called complaining of constant cough and rec

## 2021-09-24 ENCOUNTER — TELEMEDICINE (OUTPATIENT)
Dept: FAMILY MEDICINE CLINIC | Facility: CLINIC | Age: 36
End: 2021-09-24

## 2021-09-24 DIAGNOSIS — Q85.8 PEUTZ-JEGHERS SYNDROME (HCC): ICD-10-CM

## 2021-09-24 DIAGNOSIS — R06.02 SOB (SHORTNESS OF BREATH): ICD-10-CM

## 2021-09-24 DIAGNOSIS — M79.10 MYALGIA: ICD-10-CM

## 2021-09-24 DIAGNOSIS — U07.1 COVID-19: Primary | ICD-10-CM

## 2021-09-24 PROCEDURE — 99214 OFFICE O/P EST MOD 30 MIN: CPT | Performed by: FAMILY MEDICINE

## 2021-09-24 RX ORDER — METHYLPREDNISOLONE 4 MG/1
TABLET ORAL
Qty: 1 EACH | Refills: 0 | Status: SHIPPED | OUTPATIENT
Start: 2021-09-24 | End: 2021-09-29 | Stop reason: ALTCHOICE

## 2021-09-24 RX ORDER — BENZONATATE 200 MG/1
200 CAPSULE ORAL 3 TIMES DAILY PRN
Qty: 30 CAPSULE | Refills: 0 | Status: SHIPPED | OUTPATIENT
Start: 2021-09-24 | End: 2022-01-26 | Stop reason: ALTCHOICE

## 2021-09-24 NOTE — PROGRESS NOTES
HPI:    Lea Avelar is a 29year old female presents for video visit for follow-up. Patient was seen in the ER yesterday because of chest pain and shortness of breath. Additionally, patient had a few episodes of hemoptysis.   Woke up feeling better Glaucoma Neg    • Macular degeneration Neg       Social History: Social History    Tobacco Use      Smoking status: Never Smoker      Smokeless tobacco: Never Used    Vaping Use      Vaping Use: Never used    Alcohol use: Yes      Comment: occ    Drug use: night, patient warned that medication may cause sedation/fatigue. Follow-up in 1 week or sooner if needed.   Letter sent via [employer] 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

## 2021-09-27 ENCOUNTER — TELEPHONE (OUTPATIENT)
Dept: FAMILY MEDICINE CLINIC | Facility: CLINIC | Age: 36
End: 2021-09-27

## 2021-09-27 RX ORDER — PREDNISONE 10 MG/1
10 TABLET ORAL 2 TIMES DAILY
Qty: 14 TABLET | Refills: 0 | Status: SHIPPED | OUTPATIENT
Start: 2021-09-27 | End: 2021-10-04

## 2021-09-27 NOTE — TELEPHONE ENCOUNTER
Dr. Hillary Rutledge with [facility] her cough has slightly improved. She can't talk without coughing. She is wondering if she needs a stronger dose of Prednisone.  Please advise

## 2021-09-27 NOTE — TELEPHONE ENCOUNTER
To stop Medrol Dosepak, prednisone 10 mg twice daily x7 days to pharmacy. Lets have her follow-up with me on Friday. Thanks!

## 2021-09-29 ENCOUNTER — OFFICE VISIT (OUTPATIENT)
Dept: SURGERY | Facility: CLINIC | Age: 36
End: 2021-09-29

## 2021-09-29 VITALS
SYSTOLIC BLOOD PRESSURE: 131 MMHG | BODY MASS INDEX: 24.64 KG/M2 | HEART RATE: 98 BPM | HEIGHT: 69 IN | RESPIRATION RATE: 20 BRPM | DIASTOLIC BLOOD PRESSURE: 96 MMHG | WEIGHT: 166.38 LBS

## 2021-09-29 DIAGNOSIS — K63.89 SMALL BOWEL POLYP: Primary | ICD-10-CM

## 2021-09-29 PROCEDURE — 3008F BODY MASS INDEX DOCD: CPT | Performed by: SURGERY

## 2021-09-29 PROCEDURE — 3080F DIAST BP >= 90 MM HG: CPT | Performed by: SURGERY

## 2021-09-29 PROCEDURE — 3075F SYST BP GE 130 - 139MM HG: CPT | Performed by: SURGERY

## 2021-09-29 PROCEDURE — 99245 OFF/OP CONSLTJ NEW/EST HI 55: CPT | Performed by: SURGERY

## 2021-09-29 NOTE — CONSULTS
[facility]    Patient Name:  Orlin Sullivan   YOB: 1985   Gender:  Female   Appt Date:  9/29/2021   Provider:  Robert Chicas MD   Insurance:  Amna Ortiz  Referring Provider:  During the procedure, an endoscopy revealed no other small bowel lesions/polyps. 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 Left upper body: No axillary or pectoral adenopathy. Skin:     General: Skin is warm and dry. Neurological:      Mental Status: She is alert and oriented to person, place, and time.         Docu presents with   jejunal polyps. She underwent a DBE on 6/15 that demonstrated 2 large polypoid   masses found in the jejunum near her jejunal-jejunal anastomosis, thought to be   the source of her anemia.  The polyps had the appearance of hamartomas, given representative en   face   A2: Unoriented closed margin, entirely submitted en face   A3-A6: Representative polypoid lesion   A7: Grossly unremarkable bowel       B. Container label: Name, medical record number, & \"small bowel proximal   margins. \"Receive of 1.5 cm, an internal circumference   ranging from 2.0-2.3 cm, and a wall thickness of 0.4 cm. One end of the specimen   is received closed by a 3.0 cm staple line. There is no stitch present on   fragment 2.  The staples are removed and this end of the sp history of PGS. As such, I do not believe the patient has PJS and I do recommend routine screening for breast standpoint as she is at average risk. Marge Bertrand MD  [facility] General Surgical Oncology  [phone]  Pager [phone]  [email or web address]. [email or web address]@

## 2021-10-01 ENCOUNTER — LAB ENCOUNTER (OUTPATIENT)
Dept: LAB | Facility: HOSPITAL | Age: 36
End: 2021-10-01
Attending: INTERNAL MEDICINE
Payer: COMMERCIAL

## 2021-10-01 ENCOUNTER — OFFICE VISIT (OUTPATIENT)
Dept: HEMATOLOGY/ONCOLOGY | Facility: HOSPITAL | Age: 36
End: 2021-10-01
Attending: INTERNAL MEDICINE
Payer: COMMERCIAL

## 2021-10-01 ENCOUNTER — TELEMEDICINE (OUTPATIENT)
Dept: FAMILY MEDICINE CLINIC | Facility: CLINIC | Age: 36
End: 2021-10-01

## 2021-10-01 VITALS
SYSTOLIC BLOOD PRESSURE: 127 MMHG | TEMPERATURE: 99 F | OXYGEN SATURATION: 98 % | WEIGHT: 165 LBS | HEIGHT: 69 IN | BODY MASS INDEX: 24.44 KG/M2 | DIASTOLIC BLOOD PRESSURE: 79 MMHG | RESPIRATION RATE: 16 BRPM | HEART RATE: 82 BPM

## 2021-10-01 DIAGNOSIS — D50.9 IRON DEFICIENCY ANEMIA, UNSPECIFIED IRON DEFICIENCY ANEMIA TYPE: ICD-10-CM

## 2021-10-01 DIAGNOSIS — D50.9 IRON DEFICIENCY ANEMIA, UNSPECIFIED IRON DEFICIENCY ANEMIA TYPE: Primary | ICD-10-CM

## 2021-10-01 DIAGNOSIS — Q85.8 PEUTZ-JEGHERS SYNDROME (HCC): ICD-10-CM

## 2021-10-01 DIAGNOSIS — U07.1 COVID-19: Primary | ICD-10-CM

## 2021-10-01 DIAGNOSIS — Z90.710 H/O: HYSTERECTOMY: ICD-10-CM

## 2021-10-01 DIAGNOSIS — Z86.711 HISTORY OF PULMONARY EMBOLISM: ICD-10-CM

## 2021-10-01 PROCEDURE — 36415 COLL VENOUS BLD VENIPUNCTURE: CPT

## 2021-10-01 PROCEDURE — 99213 OFFICE O/P EST LOW 20 MIN: CPT | Performed by: NURSE PRACTITIONER

## 2021-10-01 PROCEDURE — 83540 ASSAY OF IRON: CPT

## 2021-10-01 PROCEDURE — 84466 ASSAY OF TRANSFERRIN: CPT

## 2021-10-01 PROCEDURE — 99214 OFFICE O/P EST MOD 30 MIN: CPT | Performed by: INTERNAL MEDICINE

## 2021-10-01 NOTE — ASSESSMENT & PLAN NOTE
Complete course of prednisone  Continue albuterol and cough meds as prescribed  Note given to return to work  Supportive care discussed to help alleviate symptoms

## 2021-10-01 NOTE — PROGRESS NOTES
Cancer Center Progress Note    Patient Name: Eva Berumen   YOB: 1985   Medical Record Number: [id number]   Attending Physician: Tula Severs, M.D.        Chief Complaint:  Iron deficiency anemia, menorrhagia, history of provoked pulmonary em 2020    based on workup, suspected diagnosis.  Treatment ongoing at [facility]-Dr. Mccormack [facility]   • Pulmonary embolism [facility]) 2016   • Small bowel polyp 2020    MULTIPLE noted in small bowel on VCE> referred for enteroscopy       Past Surgical History:  Past [facility]: Male    Other Topics      Concerns:         Service: Not Asked        Blood Transfusions: Not Asked        Caffeine Concern: Yes          Tea, soda        Occupational Exposure: Not Asked        Hobby Hazards: Not Asked        Sleep mg total) by mouth 2 (two) times daily for 7 days. , Disp: 14 tablet, Rfl: 0  •  benzonatate 200 MG Oral Cap, Take 1 capsule (200 mg total) by mouth 3 (three) times daily as needed for cough. , Disp: 30 capsule, Rfl: 0  •  Albuterol Sulfate  (90 Base) 85 09/23/2021    BUN 4 (L) 09/23/2021    BUNCREA 6.3 (L) 09/23/2021    CREATSERUM 0.64 09/23/2021    ANIONGAP 5 09/23/2021    GFRNAA 116 09/23/2021    GFRAA 134 09/23/2021    CA 8.9 09/23/2021    OSMOCALC 286 09/23/2021    ALKPHO 46 01/24/2020    AST 13 (L physician at [facility] she has now met with  school oncology here at [address] test was negative and diagnosis is now called into questionable. More consistent with multiple heterotrophic polyps.   Hold off on furthe

## 2021-10-01 NOTE — PROGRESS NOTES
HPI  Pt presents for covid follow up   Dx'd 9/9/21. Was given prednisone recently, cough medicine and is using albuterol. Was given ab infusion. No h/o asthma; has h/o PE.      Works as a .     Review of Systems   Constitutional: Negative abdominal hysterectomy N/A 1/4/2018    Procedure: ABDOMINAL HYSTERECTOMY;  Surgeon: Gume Joe MD;  Location: [address] [place] [address] MAIN OR       Family History   Problem Relation Age of Onset   • Diabetes Father    • Heart Disorder Father    • Hypertension Father Transportation (Medical): Not on file      Lack of Transportation (Non-Medical):  Not on file  Physical Activity:       Days of Exercise per Week: Not on file      Minutes of Exercise per Session: Not on file  Stress:       Feeling of Stress : Not on file Constitutional:       General: She is not in acute distress. Appearance: Normal appearance. Pulmonary:      Effort: Pulmonary effort is normal. No respiratory distress.       Comments: Occasional coughing; no audible wheezing, shortness of breath or

## 2021-12-19 ENCOUNTER — IMMUNIZATION (OUTPATIENT)
Dept: LAB | Facility: HOSPITAL | Age: 36
End: 2021-12-19
Attending: EMERGENCY MEDICINE
Payer: COMMERCIAL

## 2021-12-19 DIAGNOSIS — Z23 NEED FOR VACCINATION: Primary | ICD-10-CM

## 2021-12-19 PROCEDURE — 0001A SARSCOV2 VAC 30MCG/0.3ML IM: CPT

## 2022-01-09 ENCOUNTER — IMMUNIZATION (OUTPATIENT)
Dept: LAB | Facility: HOSPITAL | Age: 37
End: 2022-01-09
Attending: EMERGENCY MEDICINE
Payer: COMMERCIAL

## 2022-01-09 DIAGNOSIS — Z23 NEED FOR VACCINATION: Primary | ICD-10-CM

## 2022-01-09 PROCEDURE — 0052A SARSCOV2 VAC 30MCG/0.3ML IM: CPT

## 2022-01-09 PROCEDURE — 0002A SARSCOV2 VAC 30MCG/0.3ML IM: CPT

## 2022-02-15 ENCOUNTER — LAB ENCOUNTER (OUTPATIENT)
Dept: LAB | Age: 37
End: 2022-02-15
Attending: FAMILY MEDICINE
Payer: COMMERCIAL

## 2022-02-15 ENCOUNTER — OFFICE VISIT (OUTPATIENT)
Dept: FAMILY MEDICINE CLINIC | Facility: CLINIC | Age: 37
End: 2022-02-15
Payer: COMMERCIAL

## 2022-02-15 VITALS
WEIGHT: 175 LBS | OXYGEN SATURATION: 98 % | RESPIRATION RATE: 19 BRPM | DIASTOLIC BLOOD PRESSURE: 86 MMHG | SYSTOLIC BLOOD PRESSURE: 135 MMHG | HEART RATE: 87 BPM | HEIGHT: 68 IN | BODY MASS INDEX: 26.52 KG/M2

## 2022-02-15 DIAGNOSIS — Z00.00 ANNUAL PHYSICAL EXAM: Primary | ICD-10-CM

## 2022-02-15 DIAGNOSIS — Z00.00 ANNUAL PHYSICAL EXAM: ICD-10-CM

## 2022-02-15 DIAGNOSIS — D50.8 OTHER IRON DEFICIENCY ANEMIA: ICD-10-CM

## 2022-02-15 DIAGNOSIS — K59.09 CHRONIC CONSTIPATION: ICD-10-CM

## 2022-02-15 DIAGNOSIS — D50.9 IRON DEFICIENCY ANEMIA, UNSPECIFIED IRON DEFICIENCY ANEMIA TYPE: ICD-10-CM

## 2022-02-15 DIAGNOSIS — G89.29 CHRONIC PAIN OF LEFT KNEE: ICD-10-CM

## 2022-02-15 DIAGNOSIS — M25.562 CHRONIC PAIN OF LEFT KNEE: ICD-10-CM

## 2022-02-15 PROBLEM — U07.1 COVID-19: Status: RESOLVED | Noted: 2021-10-01 | Resolved: 2022-02-15

## 2022-02-15 LAB
ALBUMIN SERPL-MCNC: 3.9 G/DL (ref 3.4–5)
ALBUMIN/GLOB SERPL: 1.1 {RATIO} (ref 1–2)
ALP LIVER SERPL-CCNC: 56 U/L
ALT SERPL-CCNC: 38 U/L
ANION GAP SERPL CALC-SCNC: 3 MMOL/L (ref 0–18)
AST SERPL-CCNC: 16 U/L (ref 15–37)
BASOPHILS # BLD AUTO: 0.03 X10(3) UL (ref 0–0.2)
BASOPHILS NFR BLD AUTO: 0.4 %
BILIRUB SERPL-MCNC: 0.3 MG/DL (ref 0.1–2)
BUN BLD-MCNC: 8 MG/DL (ref 7–18)
BUN/CREAT SERPL: 11.8 (ref 10–20)
CALCIUM BLD-MCNC: 9 MG/DL (ref 8.5–10.1)
CHLORIDE SERPL-SCNC: 110 MMOL/L (ref 98–112)
CHOLEST SERPL-MCNC: 212 MG/DL (ref ?–200)
CO2 SERPL-SCNC: 27 MMOL/L (ref 21–32)
CREAT BLD-MCNC: 0.68 MG/DL
DEPRECATED HBV CORE AB SER IA-ACNC: 409.2 NG/ML
DEPRECATED RDW RBC AUTO: 39.1 FL (ref 35.1–46.3)
EOSINOPHIL # BLD AUTO: 0.11 X10(3) UL (ref 0–0.7)
EOSINOPHIL NFR BLD AUTO: 1.6 %
ERYTHROCYTE [DISTWIDTH] IN BLOOD BY AUTOMATED COUNT: 14.8 % (ref 11–15)
FASTING PATIENT LIPID ANSWER: YES
FASTING STATUS PATIENT QL REPORTED: YES
GLOBULIN PLAS-MCNC: 3.7 G/DL (ref 2.8–4.4)
GLUCOSE BLD-MCNC: 82 MG/DL (ref 70–99)
HCT VFR BLD AUTO: 40.2 %
HGB BLD-MCNC: 12.4 G/DL
IMM GRANULOCYTES # BLD AUTO: 0.04 X10(3) UL (ref 0–1)
IMM GRANULOCYTES NFR BLD: 0.6 %
IRON SATN MFR SERPL: 20 %
IRON SERPL-MCNC: 68 UG/DL
LDLC SERPL CALC-MCNC: 153 MG/DL (ref ?–100)
LYMPHOCYTES # BLD AUTO: 2.62 X10(3) UL (ref 1–4)
LYMPHOCYTES NFR BLD AUTO: 37.5 %
MCH RBC QN AUTO: 22.6 PG (ref 26–34)
MCHC RBC AUTO-ENTMCNC: 30.8 G/DL (ref 31–37)
MCV RBC AUTO: 73.4 FL
MONOCYTES # BLD AUTO: 0.38 X10(3) UL (ref 0.1–1)
MONOCYTES NFR BLD AUTO: 5.4 %
NEUTROPHILS # BLD AUTO: 3.8 X10 (3) UL (ref 1.5–7.7)
NEUTROPHILS # BLD AUTO: 3.8 X10(3) UL (ref 1.5–7.7)
NEUTROPHILS NFR BLD AUTO: 54.5 %
NONHDLC SERPL-MCNC: 172 MG/DL (ref ?–130)
OSMOLALITY SERPL CALC.SUM OF ELEC: 287 MOSM/KG (ref 275–295)
PLATELET # BLD AUTO: 280 10(3)UL (ref 150–450)
POTASSIUM SERPL-SCNC: 4 MMOL/L (ref 3.5–5.1)
PROT SERPL-MCNC: 7.6 G/DL (ref 6.4–8.2)
RBC # BLD AUTO: 5.48 X10(6)UL
SODIUM SERPL-SCNC: 140 MMOL/L (ref 136–145)
TIBC SERPL-MCNC: 335 UG/DL (ref 240–450)
TRANSFERRIN SERPL-MCNC: 225 MG/DL (ref 200–360)
TRIGL SERPL-MCNC: 103 MG/DL (ref 30–149)
TSI SER-ACNC: 0.84 MIU/ML (ref 0.36–3.74)
VLDLC SERPL CALC-MCNC: 20 MG/DL (ref 0–30)
WBC # BLD AUTO: 7 X10(3) UL (ref 4–11)

## 2022-02-15 PROCEDURE — 84466 ASSAY OF TRANSFERRIN: CPT

## 2022-02-15 PROCEDURE — 83540 ASSAY OF IRON: CPT

## 2022-02-15 PROCEDURE — 36415 COLL VENOUS BLD VENIPUNCTURE: CPT

## 2022-02-15 PROCEDURE — 99395 PREV VISIT EST AGE 18-39: CPT | Performed by: FAMILY MEDICINE

## 2022-02-15 PROCEDURE — 3075F SYST BP GE 130 - 139MM HG: CPT | Performed by: FAMILY MEDICINE

## 2022-02-15 PROCEDURE — 3008F BODY MASS INDEX DOCD: CPT | Performed by: FAMILY MEDICINE

## 2022-02-15 PROCEDURE — 84443 ASSAY THYROID STIM HORMONE: CPT

## 2022-02-15 PROCEDURE — 82728 ASSAY OF FERRITIN: CPT

## 2022-02-15 PROCEDURE — 85025 COMPLETE CBC W/AUTO DIFF WBC: CPT

## 2022-02-15 PROCEDURE — 80061 LIPID PANEL: CPT

## 2022-02-15 PROCEDURE — 80053 COMPREHEN METABOLIC PANEL: CPT

## 2022-02-15 PROCEDURE — 3079F DIAST BP 80-89 MM HG: CPT | Performed by: FAMILY MEDICINE

## 2022-02-25 LAB — HPV I/H RISK 1 DNA SPEC QL NAA+PROBE: NEGATIVE

## 2022-02-28 ENCOUNTER — TELEPHONE (OUTPATIENT)
Dept: FAMILY MEDICINE CLINIC | Facility: CLINIC | Age: 37
End: 2022-02-28

## 2022-02-28 DIAGNOSIS — D64.9 ANEMIA, UNSPECIFIED TYPE: Primary | ICD-10-CM

## 2022-03-02 NOTE — TELEPHONE ENCOUNTER
Alexandru Kellogg Said,    Please see message below from phone room.     If you agree, please sign referral.    Thank you  Monica Zhou

## 2022-03-07 ENCOUNTER — APPOINTMENT (OUTPATIENT)
Dept: HEMATOLOGY/ONCOLOGY | Facility: HOSPITAL | Age: 37
End: 2022-03-07
Payer: COMMERCIAL

## 2022-03-10 ENCOUNTER — OFFICE VISIT (OUTPATIENT)
Dept: HEMATOLOGY/ONCOLOGY | Facility: HOSPITAL | Age: 37
End: 2022-03-10
Attending: INTERNAL MEDICINE
Payer: COMMERCIAL

## 2022-03-10 VITALS
HEIGHT: 68 IN | WEIGHT: 177 LBS | HEART RATE: 87 BPM | SYSTOLIC BLOOD PRESSURE: 130 MMHG | RESPIRATION RATE: 16 BRPM | DIASTOLIC BLOOD PRESSURE: 78 MMHG | OXYGEN SATURATION: 100 % | TEMPERATURE: 99 F | BODY MASS INDEX: 26.83 KG/M2

## 2022-03-10 DIAGNOSIS — D50.9 IRON DEFICIENCY ANEMIA, UNSPECIFIED IRON DEFICIENCY ANEMIA TYPE: Primary | ICD-10-CM

## 2022-03-10 DIAGNOSIS — Z86.711 HISTORY OF PULMONARY EMBOLISM: ICD-10-CM

## 2022-03-10 DIAGNOSIS — Z90.710 H/O: HYSTERECTOMY: ICD-10-CM

## 2022-03-10 PROCEDURE — 99214 OFFICE O/P EST MOD 30 MIN: CPT | Performed by: INTERNAL MEDICINE

## 2022-03-28 ENCOUNTER — TELEPHONE (OUTPATIENT)
Dept: GASTROENTEROLOGY | Facility: CLINIC | Age: 37
End: 2022-03-28

## 2022-03-28 ENCOUNTER — APPOINTMENT (OUTPATIENT)
Dept: HEMATOLOGY/ONCOLOGY | Facility: HOSPITAL | Age: 37
End: 2022-03-28
Attending: INTERNAL MEDICINE
Payer: COMMERCIAL

## 2022-03-28 ENCOUNTER — OFFICE VISIT (OUTPATIENT)
Dept: GASTROENTEROLOGY | Facility: CLINIC | Age: 37
End: 2022-03-28
Payer: COMMERCIAL

## 2022-03-28 VITALS
DIASTOLIC BLOOD PRESSURE: 82 MMHG | WEIGHT: 178 LBS | HEART RATE: 87 BPM | TEMPERATURE: 99 F | SYSTOLIC BLOOD PRESSURE: 137 MMHG | BODY MASS INDEX: 26.98 KG/M2 | HEIGHT: 68 IN

## 2022-03-28 DIAGNOSIS — K59.00 CONSTIPATION, UNSPECIFIED CONSTIPATION TYPE: Primary | ICD-10-CM

## 2022-03-28 DIAGNOSIS — K63.89 JEJUNAL POLYP: ICD-10-CM

## 2022-03-28 PROCEDURE — 3075F SYST BP GE 130 - 139MM HG: CPT | Performed by: NURSE PRACTITIONER

## 2022-03-28 PROCEDURE — 99244 OFF/OP CNSLTJ NEW/EST MOD 40: CPT | Performed by: NURSE PRACTITIONER

## 2022-03-28 PROCEDURE — 3079F DIAST BP 80-89 MM HG: CPT | Performed by: NURSE PRACTITIONER

## 2022-03-28 PROCEDURE — 3008F BODY MASS INDEX DOCD: CPT | Performed by: NURSE PRACTITIONER

## 2022-03-28 NOTE — TELEPHONE ENCOUNTER
Scheduled for:  Colonoscopy 49596  Provider Name: Dr Mathew Decent    Date: Mon 5/16/2022    Location: [facility]   Sedation: MAC   Time: 1:30 pm, (pt is aware that [facility] will call the day before to confirm arrival time)   Prep: split trilyte   Meds/Allergies Reconciled?: NKDA   Diagnosis with codes: Constipation K59.00,  Jejunal polyp K63.89  Was patient informed to call insurance with codes (Y/N): Yes   Referral sent?:  Yes  [facility] or [facility] notified?:  Electronic case request was sent to [facility] via [employer]. Medication Orders: Pt is aware to NOT take iron pills, herbal meds and diet supplements for 7 days before exam. Also to NOT take any form of alcohol, recreational drugs and any forms of ED meds 24 hours before exam.      Misc Orders:  Patient was informed that they will need a COVID 19 test prior to their procedure. Patient verbally understood & will await a phone call from [facility] to schedule. Further instructions given by staff:   Instructions given and pt verbalized understanding

## 2022-03-28 NOTE — PATIENT INSTRUCTIONS
-linzess 72 mcg   -next visit with MD    1. Schedule colonoscopy with BENITEZ danielle/ Dr. Bernard Garcia [Diagnosis: jejunal polyp, constipation]    2.  bowel prep from pharmacy (split trilyte)    3. Continue all medications for procedure    4. Read all bowel prep instructions carefully    5. AVOID seeds, nuts, popcorn, raw fruits and vegetables (cooked is okay) for 2-3 days before procedure    6. You will need to be tested for COVID within 72 hours of your procedure. You will be contacted with instructions on how to do this.       >>>Please note: if you were prescribed Suprep for the bowel prep and it is too expensive or not covered by insurance, it is okay to substitute Trilyte (or any similar generic prep). This can be done by notifying the pharmacy or calling our office.

## 2022-04-01 ENCOUNTER — LAB ENCOUNTER (OUTPATIENT)
Dept: LAB | Facility: HOSPITAL | Age: 37
End: 2022-04-01
Attending: ORTHOPAEDIC SURGERY
Payer: COMMERCIAL

## 2022-04-01 DIAGNOSIS — M25.562 LEFT KNEE PAIN: Primary | ICD-10-CM

## 2022-04-01 LAB
BASOPHILS # BLD AUTO: 0.03 X10(3) UL (ref 0–0.2)
BASOPHILS NFR BLD AUTO: 0.4 %
DEPRECATED RDW RBC AUTO: 40 FL (ref 35.1–46.3)
EOSINOPHIL # BLD AUTO: 0.14 X10(3) UL (ref 0–0.7)
EOSINOPHIL NFR BLD AUTO: 1.8 %
ERYTHROCYTE [DISTWIDTH] IN BLOOD BY AUTOMATED COUNT: 15.6 % (ref 11–15)
HCT VFR BLD AUTO: 37.1 %
HGB BLD-MCNC: 11.2 G/DL
IMM GRANULOCYTES # BLD AUTO: 0.04 X10(3) UL (ref 0–1)
IMM GRANULOCYTES NFR BLD: 0.5 %
LYMPHOCYTES # BLD AUTO: 2.77 X10(3) UL (ref 1–4)
LYMPHOCYTES NFR BLD AUTO: 35.1 %
MCH RBC QN AUTO: 21.9 PG (ref 26–34)
MCHC RBC AUTO-ENTMCNC: 30.2 G/DL (ref 31–37)
MCV RBC AUTO: 72.6 FL
MONOCYTES # BLD AUTO: 0.46 X10(3) UL (ref 0.1–1)
MONOCYTES NFR BLD AUTO: 5.8 %
NEUTROPHILS # BLD AUTO: 4.46 X10 (3) UL (ref 1.5–7.7)
NEUTROPHILS # BLD AUTO: 4.46 X10(3) UL (ref 1.5–7.7)
NEUTROPHILS NFR BLD AUTO: 56.4 %
PLATELET # BLD AUTO: 287 10(3)UL (ref 150–450)
RBC # BLD AUTO: 5.11 X10(6)UL
WBC # BLD AUTO: 7.9 X10(3) UL (ref 4–11)

## 2022-04-01 PROCEDURE — 85025 COMPLETE CBC W/AUTO DIFF WBC: CPT

## 2022-04-01 PROCEDURE — 85610 PROTHROMBIN TIME: CPT | Performed by: ORTHOPAEDIC SURGERY

## 2022-04-01 PROCEDURE — 85730 THROMBOPLASTIN TIME PARTIAL: CPT | Performed by: ORTHOPAEDIC SURGERY

## 2022-04-01 PROCEDURE — 36415 COLL VENOUS BLD VENIPUNCTURE: CPT

## 2022-04-11 ENCOUNTER — OFFICE VISIT (OUTPATIENT)
Dept: FAMILY MEDICINE CLINIC | Facility: CLINIC | Age: 37
End: 2022-04-11
Payer: COMMERCIAL

## 2022-04-11 ENCOUNTER — EKG ENCOUNTER (OUTPATIENT)
Dept: LAB | Age: 37
End: 2022-04-11
Attending: FAMILY MEDICINE
Payer: COMMERCIAL

## 2022-04-11 ENCOUNTER — LABORATORY ENCOUNTER (OUTPATIENT)
Dept: LAB | Age: 37
End: 2022-04-11
Attending: FAMILY MEDICINE
Payer: COMMERCIAL

## 2022-04-11 VITALS
HEIGHT: 69 IN | OXYGEN SATURATION: 98 % | DIASTOLIC BLOOD PRESSURE: 84 MMHG | SYSTOLIC BLOOD PRESSURE: 135 MMHG | RESPIRATION RATE: 19 BRPM | HEART RATE: 74 BPM | BODY MASS INDEX: 26.07 KG/M2 | WEIGHT: 176 LBS

## 2022-04-11 DIAGNOSIS — Z01.818 PREOPERATIVE EXAMINATION: Primary | ICD-10-CM

## 2022-04-11 DIAGNOSIS — Z01.818 PREOPERATIVE EXAMINATION: ICD-10-CM

## 2022-04-11 LAB
ALBUMIN SERPL-MCNC: 4 G/DL (ref 3.4–5)
ALBUMIN/GLOB SERPL: 1.1 {RATIO} (ref 1–2)
ALP LIVER SERPL-CCNC: 54 U/L
ALT SERPL-CCNC: 27 U/L
ANION GAP SERPL CALC-SCNC: 5 MMOL/L (ref 0–18)
AST SERPL-CCNC: 8 U/L (ref 15–37)
BILIRUB SERPL-MCNC: 0.3 MG/DL (ref 0.1–2)
BUN BLD-MCNC: 9 MG/DL (ref 7–18)
BUN/CREAT SERPL: 11 (ref 10–20)
CALCIUM BLD-MCNC: 9.3 MG/DL (ref 8.5–10.1)
CHLORIDE SERPL-SCNC: 108 MMOL/L (ref 98–112)
CO2 SERPL-SCNC: 26 MMOL/L (ref 21–32)
CREAT BLD-MCNC: 0.82 MG/DL
FASTING STATUS PATIENT QL REPORTED: NO
GLOBULIN PLAS-MCNC: 3.6 G/DL (ref 2.8–4.4)
GLUCOSE BLD-MCNC: 79 MG/DL (ref 70–99)
OSMOLALITY SERPL CALC.SUM OF ELEC: 286 MOSM/KG (ref 275–295)
POTASSIUM SERPL-SCNC: 4 MMOL/L (ref 3.5–5.1)
PROT SERPL-MCNC: 7.6 G/DL (ref 6.4–8.2)
SODIUM SERPL-SCNC: 139 MMOL/L (ref 136–145)

## 2022-04-11 PROCEDURE — 93005 ELECTROCARDIOGRAM TRACING: CPT

## 2022-04-11 PROCEDURE — 3075F SYST BP GE 130 - 139MM HG: CPT | Performed by: FAMILY MEDICINE

## 2022-04-11 PROCEDURE — 80053 COMPREHEN METABOLIC PANEL: CPT

## 2022-04-11 PROCEDURE — 99214 OFFICE O/P EST MOD 30 MIN: CPT | Performed by: FAMILY MEDICINE

## 2022-04-11 PROCEDURE — 36415 COLL VENOUS BLD VENIPUNCTURE: CPT

## 2022-04-11 PROCEDURE — 3008F BODY MASS INDEX DOCD: CPT | Performed by: FAMILY MEDICINE

## 2022-04-11 PROCEDURE — 93010 ELECTROCARDIOGRAM REPORT: CPT | Performed by: FAMILY MEDICINE

## 2022-04-11 PROCEDURE — 3079F DIAST BP 80-89 MM HG: CPT | Performed by: FAMILY MEDICINE

## 2022-05-05 ENCOUNTER — MED REC SCAN ONLY (OUTPATIENT)
Dept: FAMILY MEDICINE CLINIC | Facility: CLINIC | Age: 37
End: 2022-05-05

## 2022-05-12 ENCOUNTER — TELEPHONE (OUTPATIENT)
Dept: FAMILY MEDICINE CLINIC | Facility: CLINIC | Age: 37
End: 2022-05-12

## 2022-05-12 NOTE — TELEPHONE ENCOUNTER
Patient is requesting a note stating she needs a standup desk for work. Patient is on blood thinners and can not remain sitting for a long period of time.  Please send note to patient's mychart

## 2022-05-13 ENCOUNTER — TELEPHONE (OUTPATIENT)
Dept: GASTROENTEROLOGY | Facility: CLINIC | Age: 37
End: 2022-05-13

## 2022-05-13 RX ORDER — POLYETHYLENE GLYCOL 3350, SODIUM CHLORIDE, SODIUM BICARBONATE, POTASSIUM CHLORIDE 420; 11.2; 5.72; 1.48 G/4L; G/4L; G/4L; G/4L
4000 POWDER, FOR SOLUTION ORAL ONCE
Qty: 4000 ML | Refills: 0 | Status: SHIPPED | OUTPATIENT
Start: 2022-05-13 | End: 2022-05-13

## 2022-05-13 NOTE — TELEPHONE ENCOUNTER
Spoke with Marcelina Warner and notified her letter sent to [facility].  No further action needed

## 2022-05-13 NOTE — TELEPHONE ENCOUNTER
Prep sent amLODIPine 10 mg oral tablet: 1 tab(s) orally once a day  Cipro 500 mg oral tablet: 1 tab(s) orally 2 times a day   polyethylene glycol 3350 oral powder for reconstitution: 17 gram(s) orally 2 times a day  senna oral tablet: 2 tab(s) orally once a day (at bedtime)  tamsulosin 0.4 mg oral capsule: 1 cap(s) orally once a day (at bedtime)

## 2022-05-16 ENCOUNTER — TELEPHONE (OUTPATIENT)
Dept: GASTROENTEROLOGY | Facility: CLINIC | Age: 37
End: 2022-05-16

## 2022-05-16 ENCOUNTER — SURGERY CENTER DOCUMENTATION (OUTPATIENT)
Dept: SURGERY | Age: 37
End: 2022-05-16

## 2022-05-16 NOTE — TELEPHONE ENCOUNTER
Increase to linzess 145mcg/day. She also has stress urinary continence, pelvic floor dysfunction? May need PT.

## 2022-06-15 NOTE — TELEPHONE ENCOUNTER
Called Patricia to let her know that her iron saturation was low, and she will need iron dextran infusion. I let her know that our  would give her a call tomorrow to set up the infusion.   I also let her know that Dr Gilford Gauze will want to see her back 6 Propranolol Counseling:  I discussed with the patient the risks of propranolol including but not limited to low heart rate, low blood pressure, low blood sugar, restlessness and increased cold sensitivity. They should call the office if they experience any of these side effects.

## 2022-06-22 ENCOUNTER — OFFICE VISIT (OUTPATIENT)
Dept: FAMILY MEDICINE CLINIC | Facility: CLINIC | Age: 37
End: 2022-06-22
Payer: COMMERCIAL

## 2022-06-22 ENCOUNTER — TELEPHONE (OUTPATIENT)
Dept: FAMILY MEDICINE CLINIC | Facility: CLINIC | Age: 37
End: 2022-06-22

## 2022-06-22 VITALS
HEART RATE: 77 BPM | WEIGHT: 170 LBS | DIASTOLIC BLOOD PRESSURE: 83 MMHG | OXYGEN SATURATION: 97 % | RESPIRATION RATE: 18 BRPM | BODY MASS INDEX: 25.18 KG/M2 | HEIGHT: 69 IN | SYSTOLIC BLOOD PRESSURE: 132 MMHG

## 2022-06-22 DIAGNOSIS — K59.09 CHRONIC CONSTIPATION: ICD-10-CM

## 2022-06-22 DIAGNOSIS — J30.2 SEASONAL ALLERGIC RHINITIS, UNSPECIFIED TRIGGER: ICD-10-CM

## 2022-06-22 DIAGNOSIS — H00.015 HORDEOLUM EXTERNUM OF LEFT LOWER EYELID: Primary | ICD-10-CM

## 2022-06-22 PROCEDURE — 3075F SYST BP GE 130 - 139MM HG: CPT | Performed by: FAMILY MEDICINE

## 2022-06-22 PROCEDURE — 3008F BODY MASS INDEX DOCD: CPT | Performed by: FAMILY MEDICINE

## 2022-06-22 PROCEDURE — 3079F DIAST BP 80-89 MM HG: CPT | Performed by: FAMILY MEDICINE

## 2022-06-22 PROCEDURE — 99214 OFFICE O/P EST MOD 30 MIN: CPT | Performed by: FAMILY MEDICINE

## 2022-06-22 NOTE — TELEPHONE ENCOUNTER
NO PROTOCOL    Patient states her left eye has been swollen for a couple of days, and this morning woke up with it even more swollen. States the swelling is \"the lower portion of my eyelid. \"    Patient denies pain, discharge, redness, blurred vision, fever.     Scheduled with Dr. Daysi Collins today at 1:45 pm.

## 2022-09-09 ENCOUNTER — LAB ENCOUNTER (OUTPATIENT)
Dept: LAB | Facility: HOSPITAL | Age: 37
End: 2022-09-09
Attending: INTERNAL MEDICINE
Payer: COMMERCIAL

## 2022-09-09 DIAGNOSIS — Z86.711 HISTORY OF PULMONARY EMBOLISM: ICD-10-CM

## 2022-09-09 DIAGNOSIS — D50.9 IRON DEFICIENCY ANEMIA, UNSPECIFIED IRON DEFICIENCY ANEMIA TYPE: ICD-10-CM

## 2022-09-09 DIAGNOSIS — Z90.710 H/O: HYSTERECTOMY: ICD-10-CM

## 2022-09-09 LAB
DEPRECATED HBV CORE AB SER IA-ACNC: 257.8 NG/ML
IRON SATN MFR SERPL: 18 %
IRON SERPL-MCNC: 55 UG/DL
TIBC SERPL-MCNC: 307 UG/DL (ref 240–450)
TRANSFERRIN SERPL-MCNC: 206 MG/DL (ref 200–360)

## 2022-09-09 PROCEDURE — 85060 BLOOD SMEAR INTERPRETATION: CPT

## 2022-09-09 PROCEDURE — 83540 ASSAY OF IRON: CPT

## 2022-09-09 PROCEDURE — 36415 COLL VENOUS BLD VENIPUNCTURE: CPT

## 2022-09-09 PROCEDURE — 84466 ASSAY OF TRANSFERRIN: CPT

## 2022-09-09 PROCEDURE — 85025 COMPLETE CBC W/AUTO DIFF WBC: CPT

## 2022-09-09 PROCEDURE — 82728 ASSAY OF FERRITIN: CPT

## 2022-09-10 LAB
BASOPHILS # BLD AUTO: 0.02 X10(3) UL (ref 0–0.2)
BASOPHILS NFR BLD AUTO: 0.2 %
DEPRECATED RDW RBC AUTO: 48.7 FL (ref 35.1–46.3)
EOSINOPHIL # BLD AUTO: 0.19 X10(3) UL (ref 0–0.7)
EOSINOPHIL NFR BLD AUTO: 2.3 %
ERYTHROCYTE [DISTWIDTH] IN BLOOD BY AUTOMATED COUNT: 20.7 % (ref 11–15)
HCT VFR BLD AUTO: 38.4 %
HGB BLD-MCNC: 11.5 G/DL
IMM GRANULOCYTES # BLD AUTO: 0.07 X10(3) UL (ref 0–1)
IMM GRANULOCYTES NFR BLD: 0.8 %
LYMPHOCYTES # BLD AUTO: 3 X10(3) UL (ref 1–4)
LYMPHOCYTES NFR BLD AUTO: 35.5 %
MCH RBC QN AUTO: 20.5 PG (ref 26–34)
MCHC RBC AUTO-ENTMCNC: 29.9 G/DL (ref 31–37)
MCV RBC AUTO: 68.6 FL
MONOCYTES # BLD AUTO: 0.56 X10(3) UL (ref 0.1–1)
MONOCYTES NFR BLD AUTO: 6.6 %
NEUTROPHILS # BLD AUTO: 4.6 X10 (3) UL (ref 1.5–7.7)
NEUTROPHILS # BLD AUTO: 4.6 X10(3) UL (ref 1.5–7.7)
NEUTROPHILS NFR BLD AUTO: 54.6 %
PLATELET # BLD AUTO: 338 10(3)UL (ref 150–450)
RBC # BLD AUTO: 5.6 X10(6)UL
WBC # BLD AUTO: 8.4 X10(3) UL (ref 4–11)

## 2022-09-12 ENCOUNTER — OFFICE VISIT (OUTPATIENT)
Dept: HEMATOLOGY/ONCOLOGY | Facility: HOSPITAL | Age: 37
End: 2022-09-12
Attending: INTERNAL MEDICINE
Payer: COMMERCIAL

## 2022-09-12 VITALS
DIASTOLIC BLOOD PRESSURE: 79 MMHG | HEIGHT: 68 IN | TEMPERATURE: 98 F | HEART RATE: 80 BPM | OXYGEN SATURATION: 100 % | BODY MASS INDEX: 25.46 KG/M2 | WEIGHT: 168 LBS | SYSTOLIC BLOOD PRESSURE: 126 MMHG | RESPIRATION RATE: 16 BRPM

## 2022-09-12 DIAGNOSIS — Z86.711 HISTORY OF PULMONARY EMBOLISM: ICD-10-CM

## 2022-09-12 DIAGNOSIS — D50.9 IRON DEFICIENCY ANEMIA, UNSPECIFIED IRON DEFICIENCY ANEMIA TYPE: Primary | ICD-10-CM

## 2022-09-12 DIAGNOSIS — Z90.710 H/O: HYSTERECTOMY: ICD-10-CM

## 2022-09-12 PROCEDURE — 99214 OFFICE O/P EST MOD 30 MIN: CPT | Performed by: INTERNAL MEDICINE

## 2022-09-23 ENCOUNTER — TELEPHONE (OUTPATIENT)
Dept: GASTROENTEROLOGY | Facility: CLINIC | Age: 37
End: 2022-09-23

## 2022-10-21 ENCOUNTER — NURSE TRIAGE (OUTPATIENT)
Dept: FAMILY MEDICINE CLINIC | Facility: CLINIC | Age: 37
End: 2022-10-21

## 2022-10-21 RX ORDER — ONDANSETRON 4 MG/1
4 TABLET, ORALLY DISINTEGRATING ORAL EVERY 8 HOURS PRN
Qty: 20 TABLET | Refills: 0 | Status: SHIPPED | OUTPATIENT
Start: 2022-10-21

## 2023-03-28 ENCOUNTER — LAB ENCOUNTER (OUTPATIENT)
Dept: LAB | Age: 38
End: 2023-03-28
Attending: FAMILY MEDICINE
Payer: COMMERCIAL

## 2023-03-28 ENCOUNTER — HOSPITAL ENCOUNTER (OUTPATIENT)
Dept: GENERAL RADIOLOGY | Age: 38
Discharge: HOME | End: 2023-03-28
Attending: FAMILY MEDICINE
Payer: COMMERCIAL

## 2023-03-28 ENCOUNTER — OFFICE VISIT (OUTPATIENT)
Dept: FAMILY MEDICINE CLINIC | Facility: CLINIC | Age: 38
End: 2023-03-28

## 2023-03-28 VITALS
OXYGEN SATURATION: 98 % | HEART RATE: 79 BPM | HEIGHT: 68 IN | WEIGHT: 176 LBS | SYSTOLIC BLOOD PRESSURE: 127 MMHG | RESPIRATION RATE: 16 BRPM | DIASTOLIC BLOOD PRESSURE: 86 MMHG | BODY MASS INDEX: 26.67 KG/M2

## 2023-03-28 DIAGNOSIS — Z01.818 PREOPERATIVE EXAMINATION: Primary | ICD-10-CM

## 2023-03-28 DIAGNOSIS — R63.5 WEIGHT GAIN: ICD-10-CM

## 2023-03-28 DIAGNOSIS — Z01.818 PREOPERATIVE EXAMINATION: ICD-10-CM

## 2023-03-28 DIAGNOSIS — Z86.2 HISTORY OF ANEMIA: ICD-10-CM

## 2023-03-28 DIAGNOSIS — L68.0 HIRSUTISM: ICD-10-CM

## 2023-03-28 LAB
ALBUMIN SERPL-MCNC: 3.9 G/DL (ref 3.4–5)
ALBUMIN/GLOB SERPL: 1.1 {RATIO} (ref 1–2)
ALP LIVER SERPL-CCNC: 75 U/L
ALT SERPL-CCNC: 50 U/L
ANION GAP SERPL CALC-SCNC: 4 MMOL/L (ref 0–18)
APTT PPP: 25.7 SECONDS (ref 23.3–35.6)
AST SERPL-CCNC: 18 U/L (ref 15–37)
ATRIAL RATE: 83 BPM
BASOPHILS # BLD AUTO: 0.04 X10(3) UL (ref 0–0.2)
BASOPHILS NFR BLD AUTO: 0.5 %
BILIRUB SERPL-MCNC: 0.2 MG/DL (ref 0.1–2)
BUN BLD-MCNC: 9 MG/DL (ref 7–18)
BUN/CREAT SERPL: 11.7 (ref 10–20)
CALCIUM BLD-MCNC: 9.4 MG/DL (ref 8.5–10.1)
CHLORIDE SERPL-SCNC: 109 MMOL/L (ref 98–112)
CHOLEST SERPL-MCNC: 214 MG/DL (ref ?–200)
CO2 SERPL-SCNC: 26 MMOL/L (ref 21–32)
CREAT BLD-MCNC: 0.77 MG/DL
DEPRECATED RDW RBC AUTO: 43.6 FL (ref 35.1–46.3)
EOSINOPHIL # BLD AUTO: 0.15 X10(3) UL (ref 0–0.7)
EOSINOPHIL NFR BLD AUTO: 1.9 %
ERYTHROCYTE [DISTWIDTH] IN BLOOD BY AUTOMATED COUNT: 17.5 % (ref 11–15)
EST. AVERAGE GLUCOSE BLD GHB EST-MCNC: 140 MG/DL (ref 68–126)
FASTING PATIENT LIPID ANSWER: YES
FASTING STATUS PATIENT QL REPORTED: YES
GFR SERPLBLD BASED ON 1.73 SQ M-ARVRAT: 102 ML/MIN/1.73M2 (ref 60–?)
GLOBULIN PLAS-MCNC: 3.6 G/DL (ref 2.8–4.4)
GLUCOSE BLD-MCNC: 112 MG/DL (ref 70–99)
HBA1C MFR BLD: 6.5 % (ref ?–5.7)
HCT VFR BLD AUTO: 36.9 %
HDLC SERPL-MCNC: 36 MG/DL (ref 40–59)
HGB BLD-MCNC: 11.2 G/DL
IMM GRANULOCYTES # BLD AUTO: 0.07 X10(3) UL (ref 0–1)
IMM GRANULOCYTES NFR BLD: 0.9 %
INR BLD: 1.02 (ref 0.85–1.16)
LDLC SERPL CALC-MCNC: 144 MG/DL (ref ?–100)
LYMPHOCYTES # BLD AUTO: 2.42 X10(3) UL (ref 1–4)
LYMPHOCYTES NFR BLD AUTO: 30.7 %
MCH RBC QN AUTO: 21.3 PG (ref 26–34)
MCHC RBC AUTO-ENTMCNC: 30.4 G/DL (ref 31–37)
MCV RBC AUTO: 70.2 FL
MONOCYTES # BLD AUTO: 0.51 X10(3) UL (ref 0.1–1)
MONOCYTES NFR BLD AUTO: 6.5 %
NEUTROPHILS # BLD AUTO: 4.69 X10 (3) UL (ref 1.5–7.7)
NEUTROPHILS # BLD AUTO: 4.69 X10(3) UL (ref 1.5–7.7)
NEUTROPHILS NFR BLD AUTO: 59.5 %
NONHDLC SERPL-MCNC: 178 MG/DL (ref ?–130)
OSMOLALITY SERPL CALC.SUM OF ELEC: 287 MOSM/KG (ref 275–295)
P AXIS: 67 DEGREES
P-R INTERVAL: 170 MS
PLATELET # BLD AUTO: 332 10(3)UL (ref 150–450)
POTASSIUM SERPL-SCNC: 4.3 MMOL/L (ref 3.5–5.1)
PROT SERPL-MCNC: 7.5 G/DL (ref 6.4–8.2)
PROTHROMBIN TIME: 13.3 SECONDS (ref 11.6–14.8)
Q-T INTERVAL: 362 MS
QRS DURATION: 84 MS
QTC CALCULATION (BEZET): 425 MS
R AXIS: 39 DEGREES
RBC # BLD AUTO: 5.26 X10(6)UL
SODIUM SERPL-SCNC: 139 MMOL/L (ref 136–145)
T AXIS: 20 DEGREES
TESTOST SERPL-MCNC: 8.68 NG/DL
TRIGL SERPL-MCNC: 185 MG/DL (ref 30–149)
TSI SER-ACNC: 0.78 MIU/ML (ref 0.36–3.74)
VENTRICULAR RATE: 83 BPM
VLDLC SERPL CALC-MCNC: 35 MG/DL (ref 0–30)
WBC # BLD AUTO: 7.9 X10(3) UL (ref 4–11)

## 2023-03-28 PROCEDURE — 84403 ASSAY OF TOTAL TESTOSTERONE: CPT

## 2023-03-28 PROCEDURE — 71046 X-RAY EXAM CHEST 2 VIEWS: CPT | Performed by: FAMILY MEDICINE

## 2023-03-28 PROCEDURE — 3074F SYST BP LT 130 MM HG: CPT | Performed by: FAMILY MEDICINE

## 2023-03-28 PROCEDURE — 36415 COLL VENOUS BLD VENIPUNCTURE: CPT

## 2023-03-28 PROCEDURE — 93000 ELECTROCARDIOGRAM COMPLETE: CPT | Performed by: FAMILY MEDICINE

## 2023-03-28 PROCEDURE — 99214 OFFICE O/P EST MOD 30 MIN: CPT | Performed by: FAMILY MEDICINE

## 2023-03-28 PROCEDURE — 85610 PROTHROMBIN TIME: CPT

## 2023-03-28 PROCEDURE — 85730 THROMBOPLASTIN TIME PARTIAL: CPT

## 2023-03-28 PROCEDURE — 3008F BODY MASS INDEX DOCD: CPT | Performed by: FAMILY MEDICINE

## 2023-03-28 PROCEDURE — 80053 COMPREHEN METABOLIC PANEL: CPT

## 2023-03-28 PROCEDURE — 3079F DIAST BP 80-89 MM HG: CPT | Performed by: FAMILY MEDICINE

## 2023-03-28 PROCEDURE — 84443 ASSAY THYROID STIM HORMONE: CPT

## 2023-03-28 PROCEDURE — 83036 HEMOGLOBIN GLYCOSYLATED A1C: CPT

## 2023-03-28 PROCEDURE — 80061 LIPID PANEL: CPT

## 2023-03-28 PROCEDURE — 85025 COMPLETE CBC W/AUTO DIFF WBC: CPT

## 2023-06-11 ENCOUNTER — LAB ENCOUNTER (OUTPATIENT)
Dept: LAB | Facility: HOSPITAL | Age: 38
End: 2023-06-11
Attending: INTERNAL MEDICINE
Payer: COMMERCIAL

## 2023-06-11 DIAGNOSIS — D50.9 IRON DEFICIENCY ANEMIA, UNSPECIFIED IRON DEFICIENCY ANEMIA TYPE: ICD-10-CM

## 2023-06-11 LAB
DEPRECATED HBV CORE AB SER IA-ACNC: 163.1 NG/ML
IRON SATN MFR SERPL: 13 %
IRON SERPL-MCNC: 43 UG/DL
TIBC SERPL-MCNC: 335 UG/DL (ref 240–450)
TRANSFERRIN SERPL-MCNC: 225 MG/DL (ref 200–360)

## 2023-06-11 PROCEDURE — 82728 ASSAY OF FERRITIN: CPT

## 2023-06-11 PROCEDURE — 85025 COMPLETE CBC W/AUTO DIFF WBC: CPT

## 2023-06-11 PROCEDURE — 84466 ASSAY OF TRANSFERRIN: CPT

## 2023-06-11 PROCEDURE — 83540 ASSAY OF IRON: CPT

## 2023-06-11 PROCEDURE — 85060 BLOOD SMEAR INTERPRETATION: CPT

## 2023-06-11 PROCEDURE — 36415 COLL VENOUS BLD VENIPUNCTURE: CPT

## 2023-06-12 ENCOUNTER — APPOINTMENT (OUTPATIENT)
Dept: HEMATOLOGY/ONCOLOGY | Facility: HOSPITAL | Age: 38
End: 2023-06-12
Attending: INTERNAL MEDICINE
Payer: COMMERCIAL

## 2023-06-12 LAB
BASOPHILS # BLD AUTO: 0.03 X10(3) UL (ref 0–0.2)
BASOPHILS NFR BLD AUTO: 0.4 %
DEPRECATED RDW RBC AUTO: 39.7 FL (ref 35.1–46.3)
EOSINOPHIL # BLD AUTO: 0.28 X10(3) UL (ref 0–0.7)
EOSINOPHIL NFR BLD AUTO: 3.8 %
ERYTHROCYTE [DISTWIDTH] IN BLOOD BY AUTOMATED COUNT: 16.2 % (ref 11–15)
HCT VFR BLD AUTO: 36.5 %
HGB BLD-MCNC: 10.8 G/DL
IMM GRANULOCYTES # BLD AUTO: 0.07 X10(3) UL (ref 0–1)
IMM GRANULOCYTES NFR BLD: 0.9 %
LYMPHOCYTES # BLD AUTO: 2.66 X10(3) UL (ref 1–4)
LYMPHOCYTES NFR BLD AUTO: 35.9 %
MCH RBC QN AUTO: 20.5 PG (ref 26–34)
MCHC RBC AUTO-ENTMCNC: 29.6 G/DL (ref 31–37)
MCV RBC AUTO: 69.3 FL
MONOCYTES # BLD AUTO: 0.38 X10(3) UL (ref 0.1–1)
MONOCYTES NFR BLD AUTO: 5.1 %
NEUTROPHILS # BLD AUTO: 3.99 X10 (3) UL (ref 1.5–7.7)
NEUTROPHILS # BLD AUTO: 3.99 X10(3) UL (ref 1.5–7.7)
NEUTROPHILS NFR BLD AUTO: 53.9 %
PLATELET # BLD AUTO: 334 10(3)UL (ref 150–450)
RBC # BLD AUTO: 5.27 X10(6)UL
WBC # BLD AUTO: 7.4 X10(3) UL (ref 4–11)

## 2023-06-16 ENCOUNTER — OFFICE VISIT (OUTPATIENT)
Dept: HEMATOLOGY/ONCOLOGY | Facility: HOSPITAL | Age: 38
End: 2023-06-16
Attending: INTERNAL MEDICINE
Payer: COMMERCIAL

## 2023-06-16 VITALS
DIASTOLIC BLOOD PRESSURE: 88 MMHG | SYSTOLIC BLOOD PRESSURE: 129 MMHG | OXYGEN SATURATION: 99 % | WEIGHT: 178 LBS | RESPIRATION RATE: 16 BRPM | HEIGHT: 68 IN | HEART RATE: 96 BPM | TEMPERATURE: 99 F | BODY MASS INDEX: 26.98 KG/M2

## 2023-06-16 DIAGNOSIS — Z90.710 H/O: HYSTERECTOMY: ICD-10-CM

## 2023-06-16 DIAGNOSIS — D50.9 IRON DEFICIENCY ANEMIA, UNSPECIFIED IRON DEFICIENCY ANEMIA TYPE: Primary | ICD-10-CM

## 2023-06-16 DIAGNOSIS — K90.89 POOR IRON ABSORPTION: ICD-10-CM

## 2023-06-16 DIAGNOSIS — Z90.49 S/P SMALL BOWEL RESECTION: ICD-10-CM

## 2023-06-16 PROCEDURE — 99214 OFFICE O/P EST MOD 30 MIN: CPT | Performed by: INTERNAL MEDICINE

## 2023-06-16 RX ORDER — FAMOTIDINE 10 MG/ML
20 INJECTION, SOLUTION INTRAVENOUS ONCE
Start: 2023-06-19 | End: 2023-06-19

## 2023-06-16 NOTE — PROGRESS NOTES
Has had iron dextran in the past. Had some itching with previous infusion, not severe,  resolved with benadryl. Orders rec'd for next dose from [facility] to premedicate with pepcid, pt advised.

## 2023-07-18 ENCOUNTER — OFFICE VISIT (OUTPATIENT)
Dept: HEMATOLOGY/ONCOLOGY | Facility: HOSPITAL | Age: 38
End: 2023-07-18
Attending: INTERNAL MEDICINE
Payer: COMMERCIAL

## 2023-07-18 VITALS
RESPIRATION RATE: 16 BRPM | DIASTOLIC BLOOD PRESSURE: 88 MMHG | HEART RATE: 84 BPM | OXYGEN SATURATION: 100 % | TEMPERATURE: 98 F | SYSTOLIC BLOOD PRESSURE: 150 MMHG

## 2023-07-18 DIAGNOSIS — D50.0 IRON DEFICIENCY ANEMIA DUE TO CHRONIC BLOOD LOSS: Primary | ICD-10-CM

## 2023-07-18 DIAGNOSIS — Z90.49 S/P SMALL BOWEL RESECTION: ICD-10-CM

## 2023-07-18 DIAGNOSIS — K90.89 POOR IRON ABSORPTION: ICD-10-CM

## 2023-07-18 PROCEDURE — 96365 THER/PROPH/DIAG IV INF INIT: CPT

## 2023-07-18 PROCEDURE — S0028 INJECTION, FAMOTIDINE, 20 MG: HCPCS

## 2023-07-18 PROCEDURE — 96375 TX/PRO/DX INJ NEW DRUG ADDON: CPT

## 2023-07-18 RX ORDER — FAMOTIDINE 10 MG/ML
20 INJECTION, SOLUTION INTRAVENOUS ONCE
Status: COMPLETED | OUTPATIENT
Start: 2023-07-18 | End: 2023-07-18

## 2023-07-18 RX ORDER — FAMOTIDINE 10 MG/ML
INJECTION, SOLUTION INTRAVENOUS
Status: COMPLETED
Start: 2023-07-18 | End: 2023-07-18

## 2023-07-18 RX ORDER — FAMOTIDINE 10 MG/ML
20 INJECTION, SOLUTION INTRAVENOUS ONCE
Status: CANCELLED
Start: 2023-07-18 | End: 2023-07-18

## 2023-07-18 RX ADMIN — FAMOTIDINE 20 MG: 10 INJECTION, SOLUTION INTRAVENOUS at 11:17:00

## 2023-07-18 NOTE — PROGRESS NOTES
Parag Canas comes in for Iron Dextran today. She has previously received Iron Dextran and experienced some itching. She was given Pepcid IVP as a premedication today. Patricia tolerated Iron Dextran without signs or symptoms of reaction noted. Vital signs stable after 30 minute observation period.

## 2023-09-09 ENCOUNTER — OFFICE VISIT (OUTPATIENT)
Dept: FAMILY MEDICINE CLINIC | Facility: CLINIC | Age: 38
End: 2023-09-09

## 2023-09-09 VITALS
SYSTOLIC BLOOD PRESSURE: 131 MMHG | HEIGHT: 68 IN | WEIGHT: 181.25 LBS | BODY MASS INDEX: 27.47 KG/M2 | TEMPERATURE: 98 F | HEART RATE: 93 BPM | DIASTOLIC BLOOD PRESSURE: 86 MMHG

## 2023-09-09 DIAGNOSIS — R10.9 ABDOMINAL PAIN, UNSPECIFIED ABDOMINAL LOCATION: ICD-10-CM

## 2023-09-09 DIAGNOSIS — Z91.89 AT HIGH RISK FOR BREAST CANCER: ICD-10-CM

## 2023-09-09 DIAGNOSIS — Z00.00 ANNUAL PHYSICAL EXAM: Primary | ICD-10-CM

## 2023-09-09 DIAGNOSIS — D50.8 OTHER IRON DEFICIENCY ANEMIA: ICD-10-CM

## 2023-09-09 DIAGNOSIS — K92.1 BLOOD IN STOOL: ICD-10-CM

## 2023-09-09 PROCEDURE — 3075F SYST BP GE 130 - 139MM HG: CPT | Performed by: FAMILY MEDICINE

## 2023-09-09 PROCEDURE — 99395 PREV VISIT EST AGE 18-39: CPT | Performed by: FAMILY MEDICINE

## 2023-09-09 PROCEDURE — 3008F BODY MASS INDEX DOCD: CPT | Performed by: FAMILY MEDICINE

## 2023-09-09 PROCEDURE — 3079F DIAST BP 80-89 MM HG: CPT | Performed by: FAMILY MEDICINE

## 2023-09-18 ENCOUNTER — HOSPITAL ENCOUNTER (OUTPATIENT)
Dept: MAMMOGRAPHY | Age: 38
Discharge: HOME | End: 2023-09-18
Attending: FAMILY MEDICINE
Payer: COMMERCIAL

## 2023-09-18 ENCOUNTER — TELEPHONE (OUTPATIENT)
Dept: FAMILY MEDICINE CLINIC | Facility: CLINIC | Age: 38
End: 2023-09-18

## 2023-09-18 DIAGNOSIS — Z91.89 AT HIGH RISK FOR BREAST CANCER: ICD-10-CM

## 2023-09-18 PROCEDURE — 77063 BREAST TOMOSYNTHESIS BI: CPT | Performed by: FAMILY MEDICINE

## 2023-09-18 PROCEDURE — 77067 SCR MAMMO BI INCL CAD: CPT | Performed by: FAMILY MEDICINE

## 2023-09-18 NOTE — TELEPHONE ENCOUNTER
Patient came in office to drop off medical records to revise. If any questions please call her, paper work is placed In MD in box.

## 2023-09-23 ENCOUNTER — HOSPITAL ENCOUNTER (OUTPATIENT)
Dept: CT IMAGING | Facility: HOSPITAL | Age: 38
Discharge: HOME | End: 2023-09-23
Attending: FAMILY MEDICINE
Payer: COMMERCIAL

## 2023-09-23 DIAGNOSIS — R10.9 ABDOMINAL PAIN, UNSPECIFIED ABDOMINAL LOCATION: ICD-10-CM

## 2023-09-23 DIAGNOSIS — D50.8 OTHER IRON DEFICIENCY ANEMIA: ICD-10-CM

## 2023-09-23 DIAGNOSIS — K92.1 BLOOD IN STOOL: ICD-10-CM

## 2023-09-23 LAB
CREAT BLD-MCNC: 0.8 MG/DL
EGFRCR SERPLBLD CKD-EPI 2021: 97 ML/MIN/1.73M2 (ref 60–?)

## 2023-09-23 PROCEDURE — 82565 ASSAY OF CREATININE: CPT

## 2023-09-23 PROCEDURE — 74177 CT ABD & PELVIS W/CONTRAST: CPT | Performed by: FAMILY MEDICINE

## 2023-10-16 ENCOUNTER — APPOINTMENT (OUTPATIENT)
Dept: HEMATOLOGY/ONCOLOGY | Facility: HOSPITAL | Age: 38
End: 2023-10-16
Attending: INTERNAL MEDICINE
Payer: COMMERCIAL

## 2023-10-30 ENCOUNTER — LAB ENCOUNTER (OUTPATIENT)
Dept: LAB | Facility: HOSPITAL | Age: 38
End: 2023-10-30
Attending: INTERNAL MEDICINE
Payer: COMMERCIAL

## 2023-10-30 DIAGNOSIS — D50.9 IRON DEFICIENCY ANEMIA, UNSPECIFIED IRON DEFICIENCY ANEMIA TYPE: ICD-10-CM

## 2023-10-30 DIAGNOSIS — K90.89 POOR IRON ABSORPTION: ICD-10-CM

## 2023-10-30 DIAGNOSIS — Z90.49 S/P SMALL BOWEL RESECTION: ICD-10-CM

## 2023-10-30 DIAGNOSIS — Z00.00 ANNUAL PHYSICAL EXAM: ICD-10-CM

## 2023-10-30 LAB
ALBUMIN SERPL-MCNC: 3.9 G/DL (ref 3.4–5)
ALBUMIN/GLOB SERPL: 1.1 {RATIO} (ref 1–2)
ALP LIVER SERPL-CCNC: 73 U/L
ALT SERPL-CCNC: 50 U/L
ANION GAP SERPL CALC-SCNC: 7 MMOL/L (ref 0–18)
AST SERPL-CCNC: 19 U/L (ref 15–37)
BASOPHILS # BLD AUTO: 0.02 X10(3) UL (ref 0–0.2)
BASOPHILS NFR BLD AUTO: 0.2 %
BILIRUB SERPL-MCNC: 0.1 MG/DL (ref 0.1–2)
BUN BLD-MCNC: 9 MG/DL (ref 7–18)
BUN/CREAT SERPL: 12.5 (ref 10–20)
CALCIUM BLD-MCNC: 9.7 MG/DL (ref 8.5–10.1)
CHLORIDE SERPL-SCNC: 107 MMOL/L (ref 98–112)
CHOLEST SERPL-MCNC: 236 MG/DL (ref ?–200)
CO2 SERPL-SCNC: 25 MMOL/L (ref 21–32)
CREAT BLD-MCNC: 0.72 MG/DL
DEPRECATED HBV CORE AB SER IA-ACNC: 428.1 NG/ML
DEPRECATED RDW RBC AUTO: 43.2 FL (ref 35.1–46.3)
EGFRCR SERPLBLD CKD-EPI 2021: 110 ML/MIN/1.73M2 (ref 60–?)
EOSINOPHIL # BLD AUTO: 0.18 X10(3) UL (ref 0–0.7)
EOSINOPHIL NFR BLD AUTO: 1.9 %
ERYTHROCYTE [DISTWIDTH] IN BLOOD BY AUTOMATED COUNT: 17.2 % (ref 11–15)
FASTING PATIENT LIPID ANSWER: NO
FASTING STATUS PATIENT QL REPORTED: NO
GLOBULIN PLAS-MCNC: 3.7 G/DL (ref 2.8–4.4)
GLUCOSE BLD-MCNC: 108 MG/DL (ref 70–99)
HCT VFR BLD AUTO: 36.2 %
HDLC SERPL-MCNC: 29 MG/DL (ref 40–59)
HGB BLD-MCNC: 11.1 G/DL
IMM GRANULOCYTES # BLD AUTO: 0.09 X10(3) UL (ref 0–1)
IMM GRANULOCYTES NFR BLD: 1 %
IRON SATN MFR SERPL: 16 %
IRON SERPL-MCNC: 47 UG/DL
LDLC SERPL CALC-MCNC: 155 MG/DL (ref ?–100)
LYMPHOCYTES # BLD AUTO: 3.1 X10(3) UL (ref 1–4)
LYMPHOCYTES NFR BLD AUTO: 33.3 %
MCH RBC QN AUTO: 21.5 PG (ref 26–34)
MCHC RBC AUTO-ENTMCNC: 30.7 G/DL (ref 31–37)
MCV RBC AUTO: 70 FL
MONOCYTES # BLD AUTO: 0.61 X10(3) UL (ref 0.1–1)
MONOCYTES NFR BLD AUTO: 6.5 %
NEUTROPHILS # BLD AUTO: 5.32 X10 (3) UL (ref 1.5–7.7)
NEUTROPHILS # BLD AUTO: 5.32 X10(3) UL (ref 1.5–7.7)
NEUTROPHILS NFR BLD AUTO: 57.1 %
NONHDLC SERPL-MCNC: 207 MG/DL (ref ?–130)
OSMOLALITY SERPL CALC.SUM OF ELEC: 287 MOSM/KG (ref 275–295)
PLATELET # BLD AUTO: 331 10(3)UL (ref 150–450)
POTASSIUM SERPL-SCNC: 4.2 MMOL/L (ref 3.5–5.1)
PROT SERPL-MCNC: 7.6 G/DL (ref 6.4–8.2)
RBC # BLD AUTO: 5.17 X10(6)UL
SODIUM SERPL-SCNC: 139 MMOL/L (ref 136–145)
TIBC SERPL-MCNC: 291 UG/DL (ref 240–450)
TRANSFERRIN SERPL-MCNC: 195 MG/DL (ref 200–360)
TRIGL SERPL-MCNC: 281 MG/DL (ref 30–149)
TSI SER-ACNC: 1.26 MIU/ML (ref 0.36–3.74)
VLDLC SERPL CALC-MCNC: 55 MG/DL (ref 0–30)
WBC # BLD AUTO: 9.3 X10(3) UL (ref 4–11)

## 2023-10-30 PROCEDURE — 85025 COMPLETE CBC W/AUTO DIFF WBC: CPT

## 2023-10-30 PROCEDURE — 80053 COMPREHEN METABOLIC PANEL: CPT

## 2023-10-30 PROCEDURE — 36415 COLL VENOUS BLD VENIPUNCTURE: CPT

## 2023-10-30 PROCEDURE — 80061 LIPID PANEL: CPT

## 2023-10-30 PROCEDURE — 83540 ASSAY OF IRON: CPT

## 2023-10-30 PROCEDURE — 84466 ASSAY OF TRANSFERRIN: CPT

## 2023-10-30 PROCEDURE — 82728 ASSAY OF FERRITIN: CPT

## 2023-10-30 PROCEDURE — 84443 ASSAY THYROID STIM HORMONE: CPT

## 2023-10-31 ENCOUNTER — OFFICE VISIT (OUTPATIENT)
Dept: HEMATOLOGY/ONCOLOGY | Facility: HOSPITAL | Age: 38
End: 2023-10-31
Attending: INTERNAL MEDICINE
Payer: COMMERCIAL

## 2023-10-31 VITALS
WEIGHT: 180 LBS | BODY MASS INDEX: 27.28 KG/M2 | TEMPERATURE: 99 F | DIASTOLIC BLOOD PRESSURE: 89 MMHG | SYSTOLIC BLOOD PRESSURE: 124 MMHG | HEIGHT: 68 IN | OXYGEN SATURATION: 100 % | HEART RATE: 90 BPM | RESPIRATION RATE: 16 BRPM

## 2023-10-31 DIAGNOSIS — K90.89 POOR IRON ABSORPTION: ICD-10-CM

## 2023-10-31 DIAGNOSIS — Z90.49 S/P SMALL BOWEL RESECTION: ICD-10-CM

## 2023-10-31 DIAGNOSIS — Z90.710 H/O: HYSTERECTOMY: ICD-10-CM

## 2023-10-31 DIAGNOSIS — D50.0 IRON DEFICIENCY ANEMIA DUE TO CHRONIC BLOOD LOSS: Primary | ICD-10-CM

## 2023-10-31 PROCEDURE — 99214 OFFICE O/P EST MOD 30 MIN: CPT | Performed by: INTERNAL MEDICINE

## 2023-10-31 RX ORDER — FAMOTIDINE 10 MG/ML
20 INJECTION, SOLUTION INTRAVENOUS ONCE
Start: 2023-10-31 | End: 2023-10-31

## 2023-11-20 ENCOUNTER — OFFICE VISIT (OUTPATIENT)
Dept: HEMATOLOGY/ONCOLOGY | Facility: HOSPITAL | Age: 38
End: 2023-11-20
Attending: INTERNAL MEDICINE
Payer: COMMERCIAL

## 2023-11-20 VITALS
OXYGEN SATURATION: 99 % | TEMPERATURE: 99 F | DIASTOLIC BLOOD PRESSURE: 86 MMHG | BODY MASS INDEX: 27 KG/M2 | RESPIRATION RATE: 16 BRPM | WEIGHT: 179.38 LBS | SYSTOLIC BLOOD PRESSURE: 132 MMHG | HEART RATE: 79 BPM

## 2023-11-20 DIAGNOSIS — D50.0 IRON DEFICIENCY ANEMIA DUE TO CHRONIC BLOOD LOSS: Primary | ICD-10-CM

## 2023-11-20 DIAGNOSIS — Z90.49 S/P SMALL BOWEL RESECTION: ICD-10-CM

## 2023-11-20 DIAGNOSIS — K90.89 POOR IRON ABSORPTION: ICD-10-CM

## 2023-11-20 PROCEDURE — 96365 THER/PROPH/DIAG IV INF INIT: CPT

## 2023-11-20 PROCEDURE — 96375 TX/PRO/DX INJ NEW DRUG ADDON: CPT

## 2023-11-20 PROCEDURE — S0028 INJECTION, FAMOTIDINE, 20 MG: HCPCS

## 2023-11-20 RX ORDER — FAMOTIDINE 10 MG/ML
20 INJECTION, SOLUTION INTRAVENOUS ONCE
Status: COMPLETED | OUTPATIENT
Start: 2023-11-20 | End: 2023-11-20

## 2023-11-20 RX ORDER — FAMOTIDINE 10 MG/ML
INJECTION, SOLUTION INTRAVENOUS
Status: COMPLETED
Start: 2023-11-20 | End: 2023-11-20

## 2023-11-20 RX ORDER — FAMOTIDINE 10 MG/ML
20 INJECTION, SOLUTION INTRAVENOUS ONCE
Status: CANCELLED
Start: 2023-11-20 | End: 2023-11-20

## 2023-11-20 RX ADMIN — FAMOTIDINE 20 MG: 10 INJECTION, SOLUTION INTRAVENOUS at 09:35:00

## 2023-11-20 NOTE — PROGRESS NOTES
Pt here for Iron Dextran Infusion. Pt denies any issues or concerns. Ordering MD: Dr. Mcgee Standard  Order Exp: 11/20/23     Patient premedicated with Pepcid d/t previous reaction of itching. Pt tolerated infusion without difficulty or complaint.  Reviewed next apt date/time: 6/3/24 @ 10am       Education Record  Learner:  Patient  Disease / Diagnosis: Iron Deficiency Anemia  Barriers / Limitations:  None  Method:  Discussion  General Topics:  Medication, Side effects and symptom management, and Plan of care reviewed  Outcome:  Shows understanding

## 2023-12-11 ENCOUNTER — OFFICE VISIT (OUTPATIENT)
Dept: FAMILY MEDICINE CLINIC | Facility: CLINIC | Age: 38
End: 2023-12-11

## 2023-12-11 ENCOUNTER — TELEPHONE (OUTPATIENT)
Dept: FAMILY MEDICINE CLINIC | Facility: CLINIC | Age: 38
End: 2023-12-11

## 2023-12-11 VITALS
HEART RATE: 100 BPM | SYSTOLIC BLOOD PRESSURE: 125 MMHG | WEIGHT: 179.81 LBS | TEMPERATURE: 98 F | HEIGHT: 68 IN | BODY MASS INDEX: 27.25 KG/M2 | DIASTOLIC BLOOD PRESSURE: 82 MMHG | OXYGEN SATURATION: 98 %

## 2023-12-11 DIAGNOSIS — J98.01 BRONCHOSPASM: Primary | ICD-10-CM

## 2023-12-11 PROBLEM — K58.1 IRRITABLE BOWEL SYNDROME WITH CONSTIPATION: Status: ACTIVE | Noted: 2021-02-24

## 2023-12-11 PROBLEM — K59.04 CHRONIC IDIOPATHIC CONSTIPATION: Status: ACTIVE | Noted: 2021-02-24

## 2023-12-11 PROBLEM — M17.12 PRIMARY OSTEOARTHRITIS OF LEFT KNEE: Status: ACTIVE | Noted: 2022-11-02

## 2023-12-11 PROCEDURE — 3008F BODY MASS INDEX DOCD: CPT | Performed by: STUDENT IN AN ORGANIZED HEALTH CARE EDUCATION/TRAINING PROGRAM

## 2023-12-11 PROCEDURE — 3074F SYST BP LT 130 MM HG: CPT | Performed by: STUDENT IN AN ORGANIZED HEALTH CARE EDUCATION/TRAINING PROGRAM

## 2023-12-11 PROCEDURE — 96372 THER/PROPH/DIAG INJ SC/IM: CPT | Performed by: STUDENT IN AN ORGANIZED HEALTH CARE EDUCATION/TRAINING PROGRAM

## 2023-12-11 PROCEDURE — 99214 OFFICE O/P EST MOD 30 MIN: CPT | Performed by: STUDENT IN AN ORGANIZED HEALTH CARE EDUCATION/TRAINING PROGRAM

## 2023-12-11 PROCEDURE — 3079F DIAST BP 80-89 MM HG: CPT | Performed by: STUDENT IN AN ORGANIZED HEALTH CARE EDUCATION/TRAINING PROGRAM

## 2023-12-11 RX ORDER — ALBUTEROL SULFATE 90 UG/1
2 AEROSOL, METERED RESPIRATORY (INHALATION) 4 TIMES DAILY PRN
COMMUNITY
Start: 2023-11-29

## 2023-12-11 RX ORDER — PREDNISONE 20 MG/1
20 TABLET ORAL 2 TIMES DAILY
COMMUNITY
Start: 2023-11-29

## 2023-12-11 RX ORDER — CODEINE PHOSPHATE AND GUAIFENESIN 10; 100 MG/5ML; MG/5ML
5 SOLUTION ORAL EVERY 6 HOURS PRN
Qty: 180 ML | Refills: 0 | Status: SHIPPED | OUTPATIENT
Start: 2023-12-11

## 2023-12-11 RX ORDER — SODIUM FLUORIDE 6 MG/ML
PASTE, DENTIFRICE DENTAL
COMMUNITY
Start: 2023-11-02

## 2023-12-11 RX ORDER — FLUTICASONE PROPIONATE AND SALMETEROL XINAFOATE 230; 21 UG/1; UG/1
1 AEROSOL, METERED RESPIRATORY (INHALATION) 2 TIMES DAILY
Qty: 1 EACH | Refills: 0 | Status: SHIPPED | OUTPATIENT
Start: 2023-12-11

## 2023-12-11 RX ORDER — AZITHROMYCIN 250 MG/1
TABLET, FILM COATED ORAL
Qty: 6 TABLET | Refills: 0 | Status: SHIPPED | OUTPATIENT
Start: 2023-12-11 | End: 2023-12-15

## 2023-12-11 RX ORDER — HYDROCODONE BITARTRATE AND HOMATROPINE METHYLBROMIDE ORAL SOLUTION 5; 1.5 MG/5ML; MG/5ML
LIQUID ORAL
COMMUNITY
Start: 2023-12-02

## 2023-12-11 RX ORDER — PREDNISONE 20 MG/1
TABLET ORAL
Qty: 18 TABLET | Refills: 0 | Status: SHIPPED | OUTPATIENT
Start: 2023-12-11 | End: 2023-12-20

## 2023-12-11 RX ORDER — METHYLPREDNISOLONE ACETATE 80 MG/ML
80 INJECTION, SUSPENSION INTRA-ARTICULAR; INTRALESIONAL; INTRAMUSCULAR; SOFT TISSUE ONCE
Status: COMPLETED | OUTPATIENT
Start: 2023-12-11 | End: 2023-12-11

## 2023-12-11 RX ADMIN — METHYLPREDNISOLONE ACETATE 80 MG: 80 INJECTION, SUSPENSION INTRA-ARTICULAR; INTRALESIONAL; INTRAMUSCULAR; SOFT TISSUE at 11:48:00

## 2023-12-11 NOTE — TELEPHONE ENCOUNTER
Patient called back and rescheduled visit for sooner   Future Appointments   Date Time Provider Razia Irizarry   12/11/2023 11:15 AM Mihir Clifton MD [facility]   12/12/2023  1:40 PM DAVID Salas [facility] ADO   6/3/2024 10:00 AM Rupesh José MD [facility] HEM ONC EMO

## 2023-12-11 NOTE — TELEPHONE ENCOUNTER
Patient called, verified Name and . Reports of cough for 3 weeks now. States she went to IC when this started, chest x-ray was done at that time which was clear. She was prescribed new inhaler, prednisone, and cough medicine. States she coughs when she talks. Last night had difficulty of breathing and had a bout of coughing for 10 minutes. She used her inhaler with very minimal relief, finally got tired and slept. She scheduled a video visit for tomorrow. Denies difficulty of breathing at this time but coughing while talking. Offered to be seen in the office this morning. Unable to due to work and works in [employer], states she can come later this afternoon after work. Appointment scheduled. Reminded to keep her inhaler handy, and take warm sips of liquid. Advised that if symptoms worsen, she should go to ED/IC for prompt evaluation and treatment. Patient verbalized understanding and agreed with plan of care. Future Appointments   Date Time Provider Razia Irizarry   2023  4:30 PM DAVID Vicente   2023  1:40 PM Corinne Luna, APRN [facility] EC ADO   6/3/2024 10:00 AM Governor Haley José MD [facility] HEM ONC EMO     Patient reminded to wear a mask upon entry into the building and throughout the appointment, verbalized understanding.

## 2024-01-31 ENCOUNTER — TELEPHONE (OUTPATIENT)
Dept: FAMILY MEDICINE CLINIC | Facility: CLINIC | Age: 39
End: 2024-01-31

## 2024-01-31 NOTE — TELEPHONE ENCOUNTER
Patient calling to request sooner appointment for pre-op visit scheduled on 02/16/24, stated surgeon's group requested sooner appointment, not sure how soon surgeon wanted appointment, no appointment available sooner. Please advise.

## 2024-02-06 NOTE — TELEPHONE ENCOUNTER
Pt does not want to do pre op with juvenal glez at this time. Pt stts her she rather see  or another Dr. Pt stts she is only available on Fridays or Saturdays or any day after 5pm. Please advise

## 2024-02-16 ENCOUNTER — LAB ENCOUNTER (OUTPATIENT)
Dept: LAB | Age: 39
End: 2024-02-16
Attending: FAMILY MEDICINE
Payer: COMMERCIAL

## 2024-02-16 ENCOUNTER — OFFICE VISIT (OUTPATIENT)
Dept: FAMILY MEDICINE CLINIC | Facility: CLINIC | Age: 39
End: 2024-02-16
Payer: COMMERCIAL

## 2024-02-16 ENCOUNTER — HOSPITAL ENCOUNTER (OUTPATIENT)
Dept: GENERAL RADIOLOGY | Age: 39
Discharge: HOME | End: 2024-02-16
Attending: FAMILY MEDICINE
Payer: COMMERCIAL

## 2024-02-16 VITALS
DIASTOLIC BLOOD PRESSURE: 78 MMHG | RESPIRATION RATE: 19 BRPM | SYSTOLIC BLOOD PRESSURE: 130 MMHG | OXYGEN SATURATION: 99 % | HEIGHT: 68 IN | WEIGHT: 182 LBS | BODY MASS INDEX: 27.58 KG/M2 | HEART RATE: 80 BPM

## 2024-02-16 DIAGNOSIS — K92.1 BLACK STOOLS: ICD-10-CM

## 2024-02-16 DIAGNOSIS — K92.1 BLOOD IN STOOL: ICD-10-CM

## 2024-02-16 DIAGNOSIS — Z01.818 PREOPERATIVE EXAMINATION: Primary | ICD-10-CM

## 2024-02-16 DIAGNOSIS — Z01.818 PREOPERATIVE EXAMINATION: ICD-10-CM

## 2024-02-16 LAB
ALBUMIN SERPL-MCNC: 4.4 G/DL (ref 3.2–4.8)
ALBUMIN/GLOB SERPL: 1.5 {RATIO} (ref 1–2)
ALP LIVER SERPL-CCNC: 61 U/L
ALT SERPL-CCNC: 34 U/L
ANION GAP SERPL CALC-SCNC: 7 MMOL/L (ref 0–18)
APTT PPP: 25.7 SECONDS (ref 23.3–35.6)
AST SERPL-CCNC: 16 U/L (ref ?–34)
ATRIAL RATE: 97 BPM
B-HCG SERPL-ACNC: <2.6 MIU/ML
BASOPHILS # BLD AUTO: 0.03 X10(3) UL (ref 0–0.2)
BASOPHILS NFR BLD AUTO: 0.4 %
BILIRUB SERPL-MCNC: 0.3 MG/DL (ref 0.3–1.2)
BUN BLD-MCNC: 8 MG/DL (ref 9–23)
BUN/CREAT SERPL: 10.4 (ref 10–20)
CALCIUM BLD-MCNC: 9.4 MG/DL (ref 8.7–10.4)
CHLORIDE SERPL-SCNC: 107 MMOL/L (ref 98–112)
CO2 SERPL-SCNC: 26 MMOL/L (ref 21–32)
CREAT BLD-MCNC: 0.77 MG/DL
DEPRECATED RDW RBC AUTO: 44.4 FL (ref 35.1–46.3)
EGFRCR SERPLBLD CKD-EPI 2021: 102 ML/MIN/1.73M2 (ref 60–?)
EOSINOPHIL # BLD AUTO: 0.16 X10(3) UL (ref 0–0.7)
EOSINOPHIL NFR BLD AUTO: 2.1 %
ERYTHROCYTE [DISTWIDTH] IN BLOOD BY AUTOMATED COUNT: 16.9 % (ref 11–15)
EST. AVERAGE GLUCOSE BLD GHB EST-MCNC: 166 MG/DL (ref 68–126)
FASTING STATUS PATIENT QL REPORTED: NO
GLOBULIN PLAS-MCNC: 2.9 G/DL (ref 2.8–4.4)
GLUCOSE BLD-MCNC: 103 MG/DL (ref 70–99)
HBA1C MFR BLD: 7.4 % (ref ?–5.7)
HCT VFR BLD AUTO: 37 %
HGB BLD-MCNC: 11.2 G/DL
IMM GRANULOCYTES # BLD AUTO: 0.07 X10(3) UL (ref 0–1)
IMM GRANULOCYTES NFR BLD: 0.9 %
INR BLD: 0.99 (ref 0.8–1.2)
LYMPHOCYTES # BLD AUTO: 2.27 X10(3) UL (ref 1–4)
LYMPHOCYTES NFR BLD AUTO: 30.2 %
MCH RBC QN AUTO: 22 PG (ref 26–34)
MCHC RBC AUTO-ENTMCNC: 30.3 G/DL (ref 31–37)
MCV RBC AUTO: 72.5 FL
MONOCYTES # BLD AUTO: 0.44 X10(3) UL (ref 0.1–1)
MONOCYTES NFR BLD AUTO: 5.9 %
NEUTROPHILS # BLD AUTO: 4.55 X10 (3) UL (ref 1.5–7.7)
NEUTROPHILS # BLD AUTO: 4.55 X10(3) UL (ref 1.5–7.7)
NEUTROPHILS NFR BLD AUTO: 60.5 %
OSMOLALITY SERPL CALC.SUM OF ELEC: 289 MOSM/KG (ref 275–295)
P AXIS: 52 DEGREES
P-R INTERVAL: 160 MS
PLATELET # BLD AUTO: 351 10(3)UL (ref 150–450)
POTASSIUM SERPL-SCNC: 3.9 MMOL/L (ref 3.5–5.1)
PROT SERPL-MCNC: 7.3 G/DL (ref 5.7–8.2)
PROTHROMBIN TIME: 13.7 SECONDS (ref 11.6–14.8)
Q-T INTERVAL: 348 MS
QRS DURATION: 86 MS
QTC CALCULATION (BEZET): 441 MS
R AXIS: 34 DEGREES
RBC # BLD AUTO: 5.1 X10(6)UL
SODIUM SERPL-SCNC: 140 MMOL/L (ref 136–145)
T AXIS: 47 DEGREES
VENTRICULAR RATE: 97 BPM
WBC # BLD AUTO: 7.5 X10(3) UL (ref 4–11)

## 2024-02-16 PROCEDURE — 85025 COMPLETE CBC W/AUTO DIFF WBC: CPT

## 2024-02-16 PROCEDURE — 3075F SYST BP GE 130 - 139MM HG: CPT | Performed by: FAMILY MEDICINE

## 2024-02-16 PROCEDURE — 71046 X-RAY EXAM CHEST 2 VIEWS: CPT | Performed by: FAMILY MEDICINE

## 2024-02-16 PROCEDURE — 99214 OFFICE O/P EST MOD 30 MIN: CPT | Performed by: FAMILY MEDICINE

## 2024-02-16 PROCEDURE — 84702 CHORIONIC GONADOTROPIN TEST: CPT

## 2024-02-16 PROCEDURE — 85610 PROTHROMBIN TIME: CPT

## 2024-02-16 PROCEDURE — 80053 COMPREHEN METABOLIC PANEL: CPT

## 2024-02-16 PROCEDURE — 36415 COLL VENOUS BLD VENIPUNCTURE: CPT

## 2024-02-16 PROCEDURE — 85730 THROMBOPLASTIN TIME PARTIAL: CPT

## 2024-02-16 PROCEDURE — 93000 ELECTROCARDIOGRAM COMPLETE: CPT | Performed by: FAMILY MEDICINE

## 2024-02-16 PROCEDURE — 3078F DIAST BP <80 MM HG: CPT | Performed by: FAMILY MEDICINE

## 2024-02-16 PROCEDURE — 3008F BODY MASS INDEX DOCD: CPT | Performed by: FAMILY MEDICINE

## 2024-02-16 PROCEDURE — 83036 HEMOGLOBIN GLYCOSYLATED A1C: CPT

## 2024-02-16 NOTE — H&P
HPI:    Patricia Gauthier is a 38 year old female presents clinic for preoperative exam.  Patient is having liposuction with Dr. Estrada on 24.   No acute concerns.  Still noticing occasional blood in her stool.  Last week, noticed her stool was very dark, almost black.  Not tarry.  Mostly normal appetite.      HISTORY:  Past Medical History:   Diagnosis Date    Anemia     COVID     2021    History of blood transfusion     EM    Knee swelling     Peutz-Jeghers polyps of small bowel (HCC)     based on workup, suspected diagnosis. Treatment ongoing at [facility]Dr. Nicholas    Pulmonary embolism (HCC)     Small bowel polyp     MULTIPLE noted in small bowel on VCE> referred for enteroscopy      Past Surgical History:   Procedure Laterality Date    ANESTH, SECTION      BOWEL RESECTION  2020    Bowel resection with polyp removal          COLECTOMY   and     HYSTERECTOMY  2017    NEEDLE BIOPSY RIGHT  10/19/2020    MRI bx right breast     OTHER      bowel obstruction    OTHER SURGICAL HISTORY      Bowel obstruction-at birth    OTHER SURGICAL HISTORY      Colon (?) mass removed    OTHER SURGICAL HISTORY      Colon resection for bowel obstruction-one month after colon mass removed.    TONSILLECTOMY      TOTAL ABDOMINAL HYSTERECTOMY N/A 2018    Procedure: ABDOMINAL HYSTERECTOMY;  Surgeon: Earnest Carrera MD;  Location: [facility] MAIN OR      Family History   Problem Relation Age of Onset    Diabetes Father     Heart Disorder Father     Hypertension Father     Diabetes Mother     Heart Disorder Mother     Cancer Mother         liver cancer    Diabetes Paternal Grandfather     Hypertension Paternal Grandfather     Breast Cancer Maternal Grandmother         in her 40's    Breast Cancer Other         Paternal Uncle breast cancer 50's    Glaucoma Neg     Macular degeneration Neg       Social History:   Social History     Socioeconomic History    Marital status: Single    Tobacco Use    Smoking status: Never    Smokeless tobacco: Never   Vaping Use    Vaping Use: Never used   Substance and Sexual Activity    Alcohol use: Yes     Comment: rarely    Drug use: No    Sexual activity: Yes     Partners: Male   Other Topics Concern    Caffeine Concern Yes     Comment: Tea, soda        Medications (Active prior to today's visit):  Current Outpatient Medications   Medication Sig Dispense Refill    fluticasone-salmeterol (ADVAIR HFA) 230-21 MCG/ACT Inhalation Aerosol Inhale 1 puff into the lungs 2 (two) times daily. 12 g 1    albuterol 108 (90 Base) MCG/ACT Inhalation Aero Soln Inhale 2 puffs into the lungs 4 (four) times daily as needed for Shortness of Breath.         Allergies:  No Known Allergies      Depression Screening (PHQ-2/PHQ-9): Over the LAST 2 WEEKS   Little interest or pleasure in doing things: Not at all    Feeling down, depressed, or hopeless: Not at all    PHQ-2 SCORE: 0           ROS:   Review of Systems   All other systems reviewed and are negative.      PHYSICAL EXAM:     Vitals:    02/16/24 1000   BP: 130/78   BP Location: Left arm   Patient Position: Sitting   Cuff Size: adult   Pulse: 80   Resp: 19   SpO2: 99%   Weight: 182 lb (82.6 kg)   Height: 5' 8\" (1.727 m)     Physical Exam  Vitals reviewed.   Constitutional:       General: She is not in acute distress.  HENT:      Head: Normocephalic and atraumatic.      Right Ear: Tympanic membrane, ear canal and external ear normal.      Left Ear: Tympanic membrane, ear canal and external ear normal.      Nose: Nose normal.      Mouth/Throat:      Pharynx: Uvula midline.   Eyes:      Conjunctiva/sclera: Conjunctivae normal.      Pupils: Pupils are equal, round, and reactive to light.   Neck:      Thyroid: No thyromegaly.   Cardiovascular:      Rate and Rhythm: Normal rate and regular rhythm.      Heart sounds: Normal heart sounds. No murmur heard.  Pulmonary:      Effort: Pulmonary effort is normal. No respiratory distress.       Breath sounds: Normal breath sounds. No wheezing or rales.   Abdominal:      General: Bowel sounds are normal. There is no distension.      Palpations: Abdomen is soft.      Tenderness: There is no abdominal tenderness. There is no guarding or rebound.   Musculoskeletal:      Cervical back: Normal range of motion and neck supple.   Lymphadenopathy:      Cervical: No cervical adenopathy.   Neurological:      Mental Status: She is alert.         ASSESSMENT/PLAN:   (Z01.818) Preoperative examination  (primary encounter diagnosis)  Plan: EKG In-Office [33949], Comp Metabolic Panel         (14) [E], Prothrombin Time (PT) [E], HCG, Beta         Subunit, Quant [E], PTT, Activated [E],         Hemoglobin A1C [E], CBC W Differential W         Platelet [E], XR CHEST PA + LAT CHEST         (CPT=71046), COVID-19 Testing by PCR ([facility])         [E]  -Stable vitals, unremarkable physical exam.  EKG done in clinic-normal sinus rhythm.  Will await labs, fax clearance to surgeon pending results    (K92.1) Blood in stool  (K92.1) Black stools  Plan: Occult Blood, Fecal, Immunoassay (Blue cards)         [E]  -Hemoccult test ordered.  If positive, will refer back to gastroenterology.             Responsible party/patient verbalized understanding of information discussed. No barriers to learning observed.          Orders This Visit:  Orders Placed This Encounter   Procedures    Comp Metabolic Panel (14) [E]    Prothrombin Time (PT) [E]    HCG, Beta Subunit, Quant [E]    PTT, Activated [E]    Hemoglobin A1C [E]    CBC W Differential W Platelet [E]    Occult Blood, Fecal, Immunoassay (Blue cards) [E]    COVID-19 Testing by PCR ([facility]) [E]       Meds This Visit:  Requested Prescriptions      No prescriptions requested or ordered in this encounter       Imaging & Referrals:  ELECTROCARDIOGRAM, COMPLETE       The 21st Century cures Act makes medical notes like these available to patients in the interest of transparency.  However, be  advised that this is a medical document.  It is intended as peer to peer communication.  It is written in medical language and may contain abbreviations or verbiage that are unfamiliar.  It may appear blunt or direct.  Medical documents are intended to carry relevant information, facts as evident, and the clinical opinion of the practitioner.      This note was created by [employer] voice recognition. Errors in content may be related to improper recognition by the system; efforts to review and correct have been done but errors may still exist. Please contact me with any questions.       2/16/2024  Keyshawn Sinha MD

## 2024-02-17 ENCOUNTER — TELEPHONE (OUTPATIENT)
Dept: FAMILY MEDICINE CLINIC | Facility: CLINIC | Age: 39
End: 2024-02-17

## 2024-02-17 NOTE — TELEPHONE ENCOUNTER
Fr[facility], please hold OFF on preoperative clearance for Patricia, do not fax yet. New dx of Type 2 DM, please schedule OV to discuss treatment options. Virtual ok. Thanks

## 2024-02-19 NOTE — TELEPHONE ENCOUNTER
Contacted patient in regards to message below, pt requested a Saturday appt offered her 03/02. Patient has been scheduled.

## 2024-02-22 ENCOUNTER — PATIENT MESSAGE (OUTPATIENT)
Dept: FAMILY MEDICINE CLINIC | Facility: CLINIC | Age: 39
End: 2024-02-22

## 2024-02-22 NOTE — TELEPHONE ENCOUNTER
Do we have her pre-op form that we can complete?  She is not cleared for surgery but doctor wants that documentation I think.

## 2024-02-22 NOTE — TELEPHONE ENCOUNTER
From: Patricia Gauthier  To: Keyshawn Sinha  Sent: 2/22/2024 1:54 PM CST  Subject: Lab work    Hi Dr. Sinha,    I know you advised against the surgery and I am working to get a refund. The office is requesting the completed form to be faxed to them at [phone]. They want to have this included in my records.     Thank you,  Patricia

## 2024-03-02 ENCOUNTER — OFFICE VISIT (OUTPATIENT)
Dept: FAMILY MEDICINE CLINIC | Facility: CLINIC | Age: 39
End: 2024-03-02
Payer: COMMERCIAL

## 2024-03-02 VITALS
BODY MASS INDEX: 27 KG/M2 | DIASTOLIC BLOOD PRESSURE: 93 MMHG | TEMPERATURE: 97 F | RESPIRATION RATE: 16 BRPM | WEIGHT: 178 LBS | HEART RATE: 85 BPM | SYSTOLIC BLOOD PRESSURE: 138 MMHG

## 2024-03-02 DIAGNOSIS — R73.09 ELEVATED HEMOGLOBIN A1C: Primary | ICD-10-CM

## 2024-03-02 PROCEDURE — 3080F DIAST BP >= 90 MM HG: CPT | Performed by: FAMILY MEDICINE

## 2024-03-02 PROCEDURE — 99214 OFFICE O/P EST MOD 30 MIN: CPT | Performed by: FAMILY MEDICINE

## 2024-03-02 PROCEDURE — 3075F SYST BP GE 130 - 139MM HG: CPT | Performed by: FAMILY MEDICINE

## 2024-03-02 RX ORDER — METFORMIN HYDROCHLORIDE 500 MG/1
500 TABLET, EXTENDED RELEASE ORAL DAILY
Qty: 30 TABLET | Refills: 0 | Status: SHIPPED | OUTPATIENT
Start: 2024-03-02 | End: 2024-03-02

## 2024-03-02 NOTE — PROGRESS NOTES
HPI:    Patricia Gauthier is a 38 year old female presents clinic for follow-up to discuss lab test results.  Elevated A1c.  Patient has noticed increased thirst, frequent urination.  Also constantly feels fatigued, occasional dizziness.  Has family history of type 2 diabetes-mother, few other family members.      HISTORY:  Past Medical History:   Diagnosis Date    Anemia     COVID     2021    History of blood transfusion     EM    Knee swelling     Peutz-Jeghers polyps of small bowel (HCC)     based on workup, suspected diagnosis. Treatment ongoing at [facility]Dr. Nicholas    Pulmonary embolism (HCC)     Small bowel polyp     MULTIPLE noted in small bowel on VCE> referred for enteroscopy      Past Surgical History:   Procedure Laterality Date    ANESTH, SECTION      BOWEL RESECTION  2020    Bowel resection with polyp removal          COLECTOMY   and     HYSTERECTOMY  2017    NEEDLE BIOPSY RIGHT  10/19/2020    MRI bx right breast     OTHER      bowel obstruction    OTHER SURGICAL HISTORY      Bowel obstruction-at birth    OTHER SURGICAL HISTORY      Colon (?) mass removed    OTHER SURGICAL HISTORY      Colon resection for bowel obstruction-one month after colon mass removed.    TONSILLECTOMY      TOTAL ABDOMINAL HYSTERECTOMY N/A 2018    Procedure: ABDOMINAL HYSTERECTOMY;  Surgeon: Earnest Carrera MD;  Location: [facility] MAIN OR      Family History   Problem Relation Age of Onset    Diabetes Father     Heart Disorder Father     Hypertension Father     Diabetes Mother     Heart Disorder Mother     Cancer Mother         liver cancer    Diabetes Paternal Grandfather     Hypertension Paternal Grandfather     Breast Cancer Maternal Grandmother         in her 40's    Breast Cancer Other         Paternal Uncle breast cancer 50's    Glaucoma Neg     Macular degeneration Neg       Social History:   Social History     Socioeconomic History    Marital status: Single    Tobacco Use    Smoking status: Never    Smokeless tobacco: Never   Vaping Use    Vaping Use: Never used   Substance and Sexual Activity    Alcohol use: Yes     Comment: rarely    Drug use: No    Sexual activity: Yes     Partners: Male   Other Topics Concern    Caffeine Concern Yes     Comment: Tea, soda        Medications (Active prior to today's visit):  Current Outpatient Medications   Medication Sig Dispense Refill    metFORMIN 500 MG Oral Tab Take 1 tablet (500 mg total) by mouth 2 (two) times daily with meals. 180 tablet 0    fluticasone-salmeterol (ADVAIR HFA) 230-21 MCG/ACT Inhalation Aerosol Inhale 1 puff into the lungs 2 (two) times daily. 12 g 1    albuterol 108 (90 Base) MCG/ACT Inhalation Aero Soln Inhale 2 puffs into the lungs 4 (four) times daily as needed for Shortness of Breath.         Allergies:  No Known Allergies      Depression Screening (PHQ-2/PHQ-9): Over the LAST 2 WEEKS                         ROS:   Review of Systems   All other systems reviewed and are negative.      PHYSICAL EXAM:     Vitals:    03/02/24 1204   BP: (!) 138/93   Pulse: 85   Resp: 16   Temp: 96.6 °F (35.9 °C)   TempSrc: Temporal   Weight: 178 lb (80.7 kg)     Physical Exam  Vitals reviewed.   Constitutional:       General: She is not in acute distress.  Pulmonary:      Effort: Pulmonary effort is normal. No respiratory distress.   Neurological:      Mental Status: She is alert.   Psychiatric:         Mood and Affect: Mood normal.         ASSESSMENT/PLAN:   (R73.09) Elevated hemoglobin A1c  (primary encounter diagnosis)  Plan:   -A1c of 7.4%.  Diet changes discussed with patient, educational materials provided.  Metformin to pharmacy, to take twice daily.  Instructions/side effects discussed.  All questions answered to the best of my ability.  Follow-up in 3 months or sooner if needed.         Responsible party/patient verbalized understanding of information discussed. No barriers to learning observed.          Orders This  Visit:  No orders of the defined types were placed in this encounter.      Meds This Visit:  Requested Prescriptions     Signed Prescriptions Disp Refills    metFORMIN 500 MG Oral Tab 180 tablet 0     Sig: Take 1 tablet (500 mg total) by mouth 2 (two) times daily with meals.       Imaging & Referrals:  None     More than 30 minutes was spent on the care of this patient and >15 minutes was spent on counseling and coordination of care.       The 21st Century cures Act makes medical notes like these available to patients in the interest of transparency.  However, be advised that this is a medical document.  It is intended as peer to peer communication.  It is written in medical language and may contain abbreviations or verbiage that are unfamiliar.  It may appear blunt or direct.  Medical documents are intended to carry relevant information, facts as evident, and the clinical opinion of the practitioner.      This note was created by [employer] voice recognition. Errors in content may be related to improper recognition by the system; efforts to review and correct have been done but errors may still exist. Please contact me with any questions.       3/2/2024  Keyshawn Sinha MD

## 2024-03-03 ENCOUNTER — PATIENT MESSAGE (OUTPATIENT)
Dept: FAMILY MEDICINE CLINIC | Facility: CLINIC | Age: 39
End: 2024-03-03

## 2024-03-04 NOTE — TELEPHONE ENCOUNTER
Called [facility].  They received prescriptions for metformin ER daily and metformin BID and they are wondering which is preferred.  Confirmed metformin BID prescription.

## 2024-03-04 NOTE — TELEPHONE ENCOUNTER
From: Patricia Gauthier  To: Keyshawn Sinha  Sent: 3/3/2024 7:25 PM CST  Subject: New meds    Hi [facility] needs additional information from you before they can fill my prescription.

## 2024-03-05 RX ORDER — METFORMIN HYDROCHLORIDE 500 MG/1
500 TABLET, EXTENDED RELEASE ORAL DAILY
Qty: 90 TABLET | Refills: 0 | OUTPATIENT
Start: 2024-03-05

## 2024-04-16 NOTE — TELEPHONE ENCOUNTER
Please review. Protocol Failed; No Protocol  No Active/ Future labs pended     Requested Prescriptions   Pending Prescriptions Disp Refills    METFORMIN 500 MG Oral Tab [Pharmacy Med Name: Metformin Hydrochloride 500 Mg Tab Scotty] 120 tablet 0     Sig: TAKE ONE TABLET BY MOUTH TWICE A DAY WITH MEALS       Diabetes Medication Protocol Failed - 4/15/2024  4:39 PM        Failed - Microalbumin procedure in past 12 months or taking ACE/ARB        Passed - Last A1C < 7.5 and within past 6 months     Lab Results   Component Value Date    A1C 7.4 (H) 02/16/2024             Passed - In person appointment or virtual visit in the past 6 mos or appointment in next 3 mos     Recent Outpatient Visits              1 month ago Elevated hemoglobin A1c    [facility] Keyshawn Sinha MD    Office Visit    2 months ago Preoperative examination    [facility] Keyshawn Sinha MD    Office Visit    4 months ago Bronchospasm    [facility]    Office Visit    4 months ago Iron deficiency anemia due to chronic blood loss    [facility]    Office Visit    5 months ago Iron deficiency anemia due to chronic blood loss    [facility] Hematology Oncology Geoff José MD    Office Visit          Future Appointments         Provider Department Appt Notes    In 1 month Geoff José MD [facility] Hematology Oncology FOLLOW UP VISIT.CL  7M                    Passed - EGFRCR or GFRAA > 50     GFR Evaluation  EGFRCR: 102 , resulted on 2/16/2024          Passed - GFR in the past 12 months               Future Appointments         Provider Department Appt Notes    In 1 month Geoff José MD [facility] Hematology Oncology FOLLOW UP VISIT.CL  7M          Recent Outpatient Visits              1 month ago Elevated hemoglobin A1c    [facility] [place]  [facility] Keyshawn Sinha MD    Office Visit    2 months ago Preoperative examination    [facility] Keyshawn Sinha MD    Office Visit    4 months [facility]    [facility]    4 months ago Iron deficiency anemia due to chronic blood loss    [facility]    Office Visit    5 months ago Iron deficiency anemia due to chronic blood loss    [facility] Hematology Oncology Geoff José MD    Office Visit

## 2024-04-22 ENCOUNTER — TELEPHONE (OUTPATIENT)
Dept: FAMILY MEDICINE CLINIC | Facility: CLINIC | Age: 39
End: 2024-04-22

## 2024-04-22 NOTE — TELEPHONE ENCOUNTER
Patient calling to schedule pre-op visit, stated does not have surgery date, asking if physical and pre-op can be done together. Also asking for lab orders to be placed ahead of visit.

## 2024-05-23 ENCOUNTER — TELEPHONE (OUTPATIENT)
Dept: HEMATOLOGY/ONCOLOGY | Facility: HOSPITAL | Age: 39
End: 2024-05-23

## 2024-05-23 NOTE — TELEPHONE ENCOUNTER
Left voicemail for patient to call back regarding appointment on 6-3-24, patient to see Dr. Keating

## 2024-06-03 ENCOUNTER — APPOINTMENT (OUTPATIENT)
Dept: HEMATOLOGY/ONCOLOGY | Facility: HOSPITAL | Age: 39
End: 2024-06-03
Attending: INTERNAL MEDICINE

## 2024-06-03 ENCOUNTER — HOSPITAL ENCOUNTER (OUTPATIENT)
Dept: GENERAL RADIOLOGY | Age: 39
Discharge: HOME | End: 2024-06-03
Attending: FAMILY MEDICINE
Payer: COMMERCIAL

## 2024-06-03 ENCOUNTER — OFFICE VISIT (OUTPATIENT)
Dept: FAMILY MEDICINE CLINIC | Facility: CLINIC | Age: 39
End: 2024-06-03
Payer: COMMERCIAL

## 2024-06-03 ENCOUNTER — LAB ENCOUNTER (OUTPATIENT)
Dept: LAB | Age: 39
End: 2024-06-03
Attending: FAMILY MEDICINE
Payer: COMMERCIAL

## 2024-06-03 VITALS
BODY MASS INDEX: 26.98 KG/M2 | HEIGHT: 68 IN | DIASTOLIC BLOOD PRESSURE: 80 MMHG | WEIGHT: 178 LBS | RESPIRATION RATE: 17 BRPM | SYSTOLIC BLOOD PRESSURE: 134 MMHG | OXYGEN SATURATION: 98 %

## 2024-06-03 DIAGNOSIS — Z01.818 PREOPERATIVE EXAMINATION: ICD-10-CM

## 2024-06-03 DIAGNOSIS — K90.89 POOR IRON ABSORPTION (HCC): ICD-10-CM

## 2024-06-03 DIAGNOSIS — R03.0 ELEVATED BP WITHOUT DIAGNOSIS OF HYPERTENSION: ICD-10-CM

## 2024-06-03 DIAGNOSIS — Z01.818 PREOPERATIVE EXAMINATION: Primary | ICD-10-CM

## 2024-06-03 DIAGNOSIS — D50.0 IRON DEFICIENCY ANEMIA DUE TO CHRONIC BLOOD LOSS: ICD-10-CM

## 2024-06-03 DIAGNOSIS — G89.29 CHRONIC PAIN OF LEFT KNEE: ICD-10-CM

## 2024-06-03 DIAGNOSIS — M25.562 CHRONIC PAIN OF LEFT KNEE: ICD-10-CM

## 2024-06-03 DIAGNOSIS — R73.03 PREDIABETES: ICD-10-CM

## 2024-06-03 LAB
ALBUMIN SERPL-MCNC: 4.5 G/DL (ref 3.2–4.8)
ALBUMIN/GLOB SERPL: 1.5 {RATIO} (ref 1–2)
ALP LIVER SERPL-CCNC: 66 U/L
ALT SERPL-CCNC: 82 U/L
ANION GAP SERPL CALC-SCNC: 5 MMOL/L (ref 0–18)
AST SERPL-CCNC: 20 U/L (ref ?–34)
ATRIAL RATE: 87 BPM
BASOPHILS # BLD AUTO: 0.03 X10(3) UL (ref 0–0.2)
BASOPHILS NFR BLD AUTO: 0.4 %
BILIRUB SERPL-MCNC: 0.2 MG/DL (ref 0.3–1.2)
BUN BLD-MCNC: 9 MG/DL (ref 9–23)
BUN/CREAT SERPL: 11.5 (ref 10–20)
CALCIUM BLD-MCNC: 9.6 MG/DL (ref 8.7–10.4)
CHLORIDE SERPL-SCNC: 107 MMOL/L (ref 98–112)
CO2 SERPL-SCNC: 28 MMOL/L (ref 21–32)
CREAT BLD-MCNC: 0.78 MG/DL
DEPRECATED HBV CORE AB SER IA-ACNC: 460.5 NG/ML
DEPRECATED RDW RBC AUTO: 40.1 FL (ref 35.1–46.3)
EGFRCR SERPLBLD CKD-EPI 2021: 100 ML/MIN/1.73M2 (ref 60–?)
EOSINOPHIL # BLD AUTO: 0.14 X10(3) UL (ref 0–0.7)
EOSINOPHIL NFR BLD AUTO: 1.7 %
ERYTHROCYTE [DISTWIDTH] IN BLOOD BY AUTOMATED COUNT: 15.9 % (ref 11–15)
EST. AVERAGE GLUCOSE BLD GHB EST-MCNC: 140 MG/DL (ref 68–126)
FASTING STATUS PATIENT QL REPORTED: YES
GLOBULIN PLAS-MCNC: 3 G/DL (ref 2–3.5)
GLUCOSE BLD-MCNC: 85 MG/DL (ref 70–99)
HBA1C MFR BLD: 6.5 % (ref ?–5.7)
HCT VFR BLD AUTO: 36.6 %
HGB BLD-MCNC: 11.5 G/DL
IMM GRANULOCYTES # BLD AUTO: 0.04 X10(3) UL (ref 0–1)
IMM GRANULOCYTES NFR BLD: 0.5 %
INR BLD: 0.9 (ref 0.8–1.2)
IRON SATN MFR SERPL: 28 %
IRON SERPL-MCNC: 70 UG/DL
LYMPHOCYTES # BLD AUTO: 2.74 X10(3) UL (ref 1–4)
LYMPHOCYTES NFR BLD AUTO: 34.1 %
MCH RBC QN AUTO: 22.6 PG (ref 26–34)
MCHC RBC AUTO-ENTMCNC: 31.4 G/DL (ref 31–37)
MCV RBC AUTO: 71.9 FL
MONOCYTES # BLD AUTO: 0.43 X10(3) UL (ref 0.1–1)
MONOCYTES NFR BLD AUTO: 5.4 %
NEUTROPHILS # BLD AUTO: 4.65 X10 (3) UL (ref 1.5–7.7)
NEUTROPHILS # BLD AUTO: 4.65 X10(3) UL (ref 1.5–7.7)
NEUTROPHILS NFR BLD AUTO: 57.9 %
OSMOLALITY SERPL CALC.SUM OF ELEC: 288 MOSM/KG (ref 275–295)
P AXIS: 60 DEGREES
P-R INTERVAL: 168 MS
PLATELET # BLD AUTO: 311 10(3)UL (ref 150–450)
POTASSIUM SERPL-SCNC: 4.1 MMOL/L (ref 3.5–5.1)
PROT SERPL-MCNC: 7.5 G/DL (ref 5.7–8.2)
PROTHROMBIN TIME: 12.6 SECONDS (ref 11.6–14.8)
Q-T INTERVAL: 354 MS
QRS DURATION: 90 MS
QTC CALCULATION (BEZET): 425 MS
R AXIS: 14 DEGREES
RBC # BLD AUTO: 5.09 X10(6)UL
SODIUM SERPL-SCNC: 140 MMOL/L (ref 136–145)
T AXIS: 23 DEGREES
TIBC SERPL-MCNC: 252 UG/DL (ref 250–425)
TRANSFERRIN SERPL-MCNC: 169 MG/DL (ref 250–380)
VENTRICULAR RATE: 87 BPM
WBC # BLD AUTO: 8 X10(3) UL (ref 4–11)

## 2024-06-03 PROCEDURE — 83036 HEMOGLOBIN GLYCOSYLATED A1C: CPT

## 2024-06-03 PROCEDURE — 84466 ASSAY OF TRANSFERRIN: CPT

## 2024-06-03 PROCEDURE — 36415 COLL VENOUS BLD VENIPUNCTURE: CPT

## 2024-06-03 PROCEDURE — 80053 COMPREHEN METABOLIC PANEL: CPT

## 2024-06-03 PROCEDURE — 85610 PROTHROMBIN TIME: CPT

## 2024-06-03 PROCEDURE — 85025 COMPLETE CBC W/AUTO DIFF WBC: CPT

## 2024-06-03 PROCEDURE — 71046 X-RAY EXAM CHEST 2 VIEWS: CPT | Performed by: FAMILY MEDICINE

## 2024-06-03 PROCEDURE — 82728 ASSAY OF FERRITIN: CPT

## 2024-06-03 PROCEDURE — 83540 ASSAY OF IRON: CPT

## 2024-06-03 NOTE — PROGRESS NOTES
HPI:    Patricia Gauthier is a 38 year old female presents to clinic for preoperative exam.  She previously canceled cosmetic procedure for liposuction because of new diagnosis of diabetes.  Also recently saw fertility doctor who has diagnosed her with PCOS.  Patient has been experiencing left-sided chronic knee pain.  Orthopedic surgeon for another surgical procedure.  Pain improved with cortisone injection, but feels that her knee gives out on her frequently.  Unable to exercise or walk quickly.  Normal appetite.  Normal bowel patient.  No change in sleep habits.      HISTORY:  Past Medical History:    Anemia    COVID    2021    History of blood transfusion    EM    Knee swelling    Peutz-Jeghers polyps of small bowel (HCC)    based on workup, suspected diagnosis. Treatment ongoing at [facility]-Dr. Nicholas    Pulmonary embolism (HCC)    Small bowel polyp    MULTIPLE noted in small bowel on VCE> referred for enteroscopy      Past Surgical History:   Procedure Laterality Date    Anesth, section      Bowel resection  2020    Bowel resection with polyp removal          Colectomy   and     Hysterectomy  2017    Needle biopsy right  10/19/2020    MRI bx right breast     Other      bowel obstruction    Other surgical history      Bowel obstruction-at birth    Other surgical history      Colon (?) mass removed    Other surgical history      Colon resection for bowel obstruction-one month after colon mass removed.    Tonsillectomy      Total abdominal hysterectomy N/A 2018    Procedure: ABDOMINAL HYSTERECTOMY;  Surgeon: Earnest Carrera MD;  Location: [facility] MAIN OR      Family History   Problem Relation Age of Onset    Diabetes Father     Heart Disorder Father     Hypertension Father     Diabetes Mother     Heart Disorder Mother     Cancer Mother         liver cancer    Diabetes Paternal Grandfather     Hypertension Paternal Grandfather     Breast Cancer Maternal Grandmother          in her 40's    Breast Cancer Other         Paternal Uncle breast cancer 50's    Glaucoma Neg     Macular degeneration Neg       Social History:   Social History     Socioeconomic History    Marital status: Single   Tobacco Use    Smoking status: Never    Smokeless tobacco: Never   Vaping Use    Vaping status: Never Used   Substance and Sexual Activity    Alcohol use: Yes     Comment: rarely    Drug use: No    Sexual activity: Yes     Partners: Male   Other Topics Concern    Caffeine Concern Yes     Comment: Tea, soda     Social Determinants of Health      [facility]    Social Connections    [facility] from [facility]    Housing Stability        Medications (Active prior to today's visit):  Current Outpatient Medications   Medication Sig Dispense Refill    metFORMIN 500 MG Oral Tab Take 1 tablet (500 mg total) by mouth 2 (two) times daily with meals. 120 tablet 3    fluticasone-salmeterol (ADVAIR HFA) 230-21 MCG/ACT Inhalation Aerosol Inhale 1 puff into the lungs 2 (two) times daily. 12 g 1    albuterol 108 (90 Base) MCG/ACT Inhalation Aero Soln Inhale 2 puffs into the lungs 4 (four) times daily as needed for Shortness of Breath.         Allergies:  No Known Allergies           ROS:   Review of Systems   All other systems reviewed and are negative.      PHYSICAL EXAM:     Vitals:    06/03/24 0841 06/03/24 0954   BP: (!) 144/95 134/80   BP Location: Right arm    Patient Position: Sitting    Cuff Size: adult    Resp: 17    SpO2: 98%    Weight: 178 lb (80.7 kg)    Height: 5' 8\" (1.727 m)      Physical Exam  Vitals reviewed.   Constitutional:       General: She is not in acute distress.  HENT:      Head: Normocephalic and atraumatic.      Right Ear: Tympanic membrane, ear canal and external ear normal.      Left Ear: Tympanic membrane, ear canal and external ear normal.      Nose: Nose normal.       Mouth/Throat:      Pharynx: Uvula midline.   Eyes:      Conjunctiva/sclera: Conjunctivae normal.      Pupils: Pupils are equal, round, and reactive to light.   Neck:      Thyroid: No thyromegaly.   Cardiovascular:      Rate and Rhythm: Normal rate and regular rhythm.      Heart sounds: Normal heart sounds. No murmur heard.  Pulmonary:      Effort: Pulmonary effort is normal. No respiratory distress.      Breath sounds: Normal breath sounds. No wheezing or rales.   Abdominal:      General: Bowel sounds are normal. There is no distension.      Palpations: Abdomen is soft.      Tenderness: There is no abdominal tenderness. There is no guarding or rebound.   Musculoskeletal:      Cervical back: Normal range of motion and neck supple.   Lymphadenopathy:      Cervical: No cervical adenopathy.   Neurological:      Mental Status: She is alert.         ASSESSMENT/PLAN:   (Z01.818) Preoperative examination  (primary encounter diagnosis)  Plan: Comp Metabolic Panel (14) [E], Prothrombin Time        (PT) [E], EKG In-Office [64052], CBC W         Differential W Platelet [E], Hemoglobin A1C         [E], XR CHEST PA + LAT CHEST (CPT=71046)  - Stable vitals, unremarkable physical exam. Preoperative labs drawn. EKG done in clinic - Normal rate, sinus rhythm, normal axis. No ST/T wave abnormalities. Normal study.  Will await results and fax clearance to surgeon.     (R73.03) Prediabetes  Plan:   - likely secondary to PCOS. Diabetic diet encouraged. She is taking Metformin regularly. A1C rechecked.     (R03.0) Elevated BP without diagnosis of hypertension  Plan:   - BP elevated, improved on second reading. Asymptomatic. Will continue to monitor.     (M25.562,  G89.29) Chronic pain of left knee  Plan:   - per Dr. Tvaarez, patient will likely require another surgery down the line. I will touch base with him and get back to patient with referrals.             Responsible party/patient verbalized understanding of information discussed.  No barriers to learning observed.            Orders This Visit:  Orders Placed This Encounter   Procedures    Comp Metabolic Panel (14) [E]    Prothrombin Time (PT) [E]    CBC W Differential W Platelet [E]    Hemoglobin A1C [E]       Meds This Visit:  Requested Prescriptions      No prescriptions requested or ordered in this encounter       Imaging & Referrals:  ELECTROCARDIOGRAM, COMPLETE  XR CHEST PA + LAT CHEST (CPT=71046)     Chaperone offered at visit today.     The 21st Century cures Act makes medical notes like these available to patients in the interest of transparency.  However, be advised that this is a medical document.  It is intended as peer to peer communication.  It is written in medical language and may contain abbreviations or verbiage that are unfamiliar.  It may appear blunt or direct.  Medical documents are intended to carry relevant information, facts as evident, and the clinical opinion of the practitioner.      This note was created by [employer] voice recognition. Errors in content may be related to improper recognition by the system; efforts to review and correct have been done but errors may still exist. Please contact me with any questions.       6/3/2024  Keyshawn Sinha MD

## 2024-06-04 ENCOUNTER — TELEPHONE (OUTPATIENT)
Dept: FAMILY MEDICINE CLINIC | Facility: CLINIC | Age: 39
End: 2024-06-04

## 2024-06-06 ENCOUNTER — PATIENT MESSAGE (OUTPATIENT)
Dept: FAMILY MEDICINE CLINIC | Facility: CLINIC | Age: 39
End: 2024-06-06

## 2024-06-06 NOTE — TELEPHONE ENCOUNTER
From: Patricia Gauthier  To: Keyshawn Sinha  Sent: 6/6/2024 9:45 AM CDT  Subject: Medical clearance     Hi Dr Sinha,    The medical office did not receive my clearance. Can you please send another fax to [phone] and also to me just in case?    Thank you,  Patricia

## 2024-06-07 ENCOUNTER — TELEPHONE (OUTPATIENT)
Dept: HEMATOLOGY/ONCOLOGY | Facility: HOSPITAL | Age: 39
End: 2024-06-07

## 2024-06-07 NOTE — TELEPHONE ENCOUNTER
Received a [place] message to cancel appointment w/[facility].  I spoke with the patient and she will call back when she is able to reschedule.

## 2024-06-21 ENCOUNTER — APPOINTMENT (OUTPATIENT)
Dept: FAMILY MEDICINE | Age: 39
End: 2024-06-21

## 2024-07-24 NOTE — TELEPHONE ENCOUNTER
".Nursing report ED to floor  Hazel Bucio  84 y.o.  female    HPI :  HPI (Adult)  Stated Reason for Visit: leg injury    Chief Complaint  Chief Complaint   Patient presents with    Leg Injury       Admitting doctor:   Kay Merino MD    Admitting diagnosis:   The primary encounter diagnosis was Closed fracture of distal end of right femur, unspecified fracture morphology, initial encounter. Diagnoses of Right hip pain, Right arm pain, History of dementia, and Anticoagulated were also pertinent to this visit.    Code status:   Current Code Status       Date Active Code Status Order ID Comments User Context       7/23/2024 2001 CPR (Attempt to Resuscitate) [id number]  Kay Merino MD ED        Question Answer    Code Status (Patient has no pulse and is not breathing) CPR (Attempt to Resuscitate)    Medical Interventions (Patient has pulse or is breathing) Full Support                    Allergies:   Amoxicillin, Ciprofloxacin, Oxycodone-acetaminophen, Penicillins, Sulfa antibiotics, Cephalexin, Clavulanic acid, Codeine, Contrast dye (echo or unknown ct/mr), Doxycycline, Fluconazole, Iodinated contrast media, Iodine, Macrobid [nitrofurantoin], Tobramycin, and Trimethoprim    Isolation:   Contact    Intake and Output  No intake or output data in the 24 hours ending 07/23/24 2016    Weight:       07/23/24  1729   Weight: 55.3 kg (121 lb 14.6 oz)       Most recent vitals:   Vitals:    07/23/24 1712 07/23/24 1729 07/23/24 1731 07/23/24 1849   BP: 147/73  134/76    Pulse: 63  110 116   Resp: 16   18   Temp: 98.5 °F (36.9 °C)      SpO2: 98%   99%   Weight:  55.3 kg (121 lb 14.6 oz)     Height:  170.2 cm (67\")         Active LDAs/IV Access:   Lines, Drains & Airways       Active LDAs       Name Placement date Placement time Site Days    Peripheral IV 07/23/24 1844 Left Antecubital 07/23/24 1844  Antecubital  less than 1    Urostomy RLQ 12/07/21  permanent had on admission  1900  RLQ  959    Hemodialysis " Routed to triage Cath Double 06/23/23  1243  Chest  396                    Labs (abnormal labs have a star):   Labs Reviewed   COMPREHENSIVE METABOLIC PANEL - Abnormal; Notable for the following components:       Result Value    Glucose 109 (*)     BUN 40 (*)     Creatinine 3.92 (*)     Sodium 135 (*)     Chloride 95 (*)     Albumin 3.4 (*)     Alkaline Phosphatase 154 (*)     eGFR 10.9 (*)     All other components within normal limits    Narrative:     GFR Normal >60  Chronic Kidney Disease <60  Kidney Failure <15    The GFR formula is only valid for adults with stable renal function between ages 18 and 70.   PROTIME-INR - Abnormal; Notable for the following components:    Protime 19.8 (*)     INR 1.65 (*)     All other components within normal limits   CBC WITH AUTO DIFFERENTIAL - Abnormal; Notable for the following components:    RBC 3.41 (*)     Hemoglobin 10.7 (*)     Hematocrit 33.5 (*)     MCV 98.2 (*)     Monocyte % 12.8 (*)     Monocytes, Absolute 1.10 (*)     All other components within normal limits   APTT - Normal   CBC AND DIFFERENTIAL    Narrative:     The following orders were created for panel order CBC & Differential.  Procedure                               Abnormality         Status                     ---------                               -----------         ------                     CBC Auto Differential[[id number]]        Abnormal            Final result                 Please view results for these tests on the individual orders.       EKG:   No orders to display       Meds given in ED:   Medications   sodium chloride 0.9 % flush 10 mL (has no administration in time range)   sodium chloride 0.9 % infusion (has no administration in time range)   HYDROcodone-acetaminophen (NORCO) 5-325 MG per tablet 1 tablet (has no administration in time range)   ondansetron ODT (ZOFRAN-ODT) disintegrating tablet 4 mg (has no administration in time range)     Or   ondansetron (ZOFRAN) injection 4 mg (has no administration in  time range)   melatonin tablet 2.5 mg (has no administration in time range)   sennosides-docusate (PERICOLACE) 8.6-50 MG per tablet 2 tablet (has no administration in time range)     And   polyethylene glycol (MIRALAX) packet 17 g (has no administration in time range)     And   bisacodyl (DULCOLAX) EC tablet 5 mg (has no administration in time range)     And   bisacodyl (DULCOLAX) suppository 10 mg (has no administration in time range)   HYDROmorphone (DILAUDID) injection 0.5 mg (has no administration in time range)     And   naloxone (NARCAN) injection 0.4 mg (has no administration in time range)   HYDROmorphone (DILAUDID) injection 0.25 mg (0.25 mg Intravenous Given 7/23/24 1908)   ondansetron (ZOFRAN) injection 4 mg (4 mg Intravenous Given 7/23/24 1947)   morphine injection 2 mg (2 mg Intravenous Given 7/23/24 1947)       Imaging results:  XR Hip With or Without Pelvis 2 - 3 View Right    Result Date: 7/23/2024  1. Distal right femoral metaphyseal fracture with impaction and anterior lateral angulation and potential intracondylar extension. Osteopenia. 2. Limited evaluation of the right shoulder/proximal humerus. No definite right humeral fracture is evident though if there is ongoing suspicion for right humeral head or shoulder fracture, I would recommend dedicated right shoulder x-rays. 3. Osteopenia limits evaluation of the pelvis/right hip without evidence for acute abnormality of the pelvis or right hip. 4. No evidence for acute abnormality of the right elbow.  This report was finalized on 7/23/2024 7:24 PM by Dr. Italo Hackett M.D on Workstation: [id number]      XR Knee 1 or 2 View Right    Result Date: 7/23/2024  1. Distal right femoral metaphyseal fracture with impaction and anterior lateral angulation and potential intracondylar extension. Osteopenia. 2. Limited evaluation of the right shoulder/proximal humerus. No definite right humeral fracture is evident though if there is ongoing suspicion for  right humeral head or shoulder fracture, I would recommend dedicated right shoulder x-rays. 3. Osteopenia limits evaluation of the pelvis/right hip without evidence for acute abnormality of the pelvis or right hip. 4. No evidence for acute abnormality of the right elbow.  This report was finalized on 7/23/2024 7:24 PM by Dr. Italo Hackett M.D on Workstation: [id number]      XR Humerus Right    Result Date: 7/23/2024  1. Distal right femoral metaphyseal fracture with impaction and anterior lateral angulation and potential intracondylar extension. Osteopenia. 2. Limited evaluation of the right shoulder/proximal humerus. No definite right humeral fracture is evident though if there is ongoing suspicion for right humeral head or shoulder fracture, I would recommend dedicated right shoulder x-rays. 3. Osteopenia limits evaluation of the pelvis/right hip without evidence for acute abnormality of the pelvis or right hip. 4. No evidence for acute abnormality of the right elbow.  This report was finalized on 7/23/2024 7:24 PM by Dr. Italo Hackett M.D on Workstation: [id number]      XR Elbow 2 View Right    Result Date: 7/23/2024  1. Distal right femoral metaphyseal fracture with impaction and anterior lateral angulation and potential intracondylar extension. Osteopenia. 2. Limited evaluation of the right shoulder/proximal humerus. No definite right humeral fracture is evident though if there is ongoing suspicion for right humeral head or shoulder fracture, I would recommend dedicated right shoulder x-rays. 3. Osteopenia limits evaluation of the pelvis/right hip without evidence for acute abnormality of the pelvis or right hip. 4. No evidence for acute abnormality of the right elbow.  This report was finalized on 7/23/2024 7:24 PM by Dr. Italo Hackett M.D on Workstation: [id number]       Ambulatory status:   - bedrest    Social issues:   Social History     Socioeconomic History    Marital status:     Tobacco Use    Smoking status: Never     Passive exposure: Never    Smokeless tobacco: Never   Vaping Use    Vaping status: Never Used   Substance and Sexual Activity    Alcohol use: Not Currently    Drug use: Never    Sexual activity: Defer       Peripheral Neurovascular  Peripheral Neurovascular (Adult)  Peripheral Neurovascular WDL: .WDL except  Additional Documentation: Edema (Group)  Edema  Edema: knee, right, leg, right  Leg, Right Edema: 2+ (Mild)  Knee, Right Edema: 4+ (Severe)    Neuro Cognitive  Neuro Cognitive (Adult)  Cognitive/Neuro/Behavioral WDL: .WDL except, orientation  Orientation: disoriented to, place, time, situation    Learning  Learning Assessment (Adult)  Learning Readiness and Ability: no barriers identified  Education Provided  Person Taught: patient  Teaching Method: verbal instruction    Respiratory  Respiratory (Adult)  Airway WDL: WDL  Respiratory WDL  Respiratory WDL: WDL    Abdominal Pain       Pain Assessments  Pain (Adult)  (0-10) Pain Rating: Rest: 10  (0-10) Pain Rating: Activity: 10    NIH Stroke Scale       Lawanda Harvey RN  07/23/24 20:16 EDT

## 2024-08-21 NOTE — TELEPHONE ENCOUNTER
Pt called back and instructed on stat ultrasound. Pt is alone at home now- no one for  Transportation    I Informed her I will call the dept to schedule ultrasound and she is to call to arrange transportation. [facility]

## 2024-09-30 ENCOUNTER — MED REC SCAN ONLY (OUTPATIENT)
Dept: FAMILY MEDICINE CLINIC | Facility: CLINIC | Age: 39
End: 2024-09-30

## 2024-11-14 NOTE — TELEPHONE ENCOUNTER
Rissa Crenshaw, therapist from [facility] calling to inform doctor of pt's status. Rissa Crenshaw states she has a release from the pt and she would like Dr. Estrada Damon to know pt may need an increase in her antidepressant.  Pt is still lacking motivation and fe Quality 137: Melanoma: Continuity Of Care - Recall System: Patient information entered into a recall system that includes: target date for the next exam specified AND a process to follow up with patients regarding missed or unscheduled appointments Quality 226: Preventive Care And Screening: Tobacco Use: Screening And Cessation Intervention: Patient screened for tobacco use and is an ex/non-smoker Quality 410: Psoriasis Clinical Response To Oral Systemic Or Biologic Medications: Psoriasis Assessment Tool Documented, Met Specified Benchmark Detail Level: Detailed

## 2024-12-03 ENCOUNTER — TELEPHONE (OUTPATIENT)
Dept: FAMILY MEDICINE CLINIC | Facility: CLINIC | Age: 39
End: 2024-12-03

## 2024-12-03 NOTE — TELEPHONE ENCOUNTER
L5-S1 Microdiscectomy on 12/09/24 with Dr. Horton @ [facility]    H&P- Completed 12/04/24 with Dr. Kendrick Mckeon  Labs- BUN (7), BUN/Crea (9), A1C (6.3), MRSA neg, all other labs WNL  EKG- done 09/22/24 at  will print and scan in chart  X-ray- CT chest/abdomen/pelvis done 09/22/24    Hx + for PE and acute pyelonephritis    Hematology- Dr. Alva Alba  Hematology recommendations scanned into Media 12/04/24:  \"Prophylactic anticoagulation following any orthopedic procedure. May give Lovenox 40mg subcutaneous daily x10 days- or until she is ambulatory, first dose post op- OR- Pradaxa 110mg the night of the operation, as long as this is cleared by her orthopedist and then 220mg the following morning and daily for 10 days or until she is ambulatory.\"

## 2024-12-04 ENCOUNTER — OFFICE VISIT (OUTPATIENT)
Dept: FAMILY MEDICINE CLINIC | Facility: CLINIC | Age: 39
End: 2024-12-04
Payer: COMMERCIAL

## 2024-12-04 ENCOUNTER — LAB ENCOUNTER (OUTPATIENT)
Dept: LAB | Facility: HOSPITAL | Age: 39
End: 2024-12-04
Attending: FAMILY MEDICINE
Payer: COMMERCIAL

## 2024-12-04 VITALS
SYSTOLIC BLOOD PRESSURE: 118 MMHG | BODY MASS INDEX: 26.07 KG/M2 | OXYGEN SATURATION: 99 % | DIASTOLIC BLOOD PRESSURE: 76 MMHG | RESPIRATION RATE: 16 BRPM | HEIGHT: 68.5 IN | WEIGHT: 174 LBS | HEART RATE: 88 BPM

## 2024-12-04 DIAGNOSIS — Z01.812 PRE-OPERATIVE LABORATORY EXAMINATION: ICD-10-CM

## 2024-12-04 DIAGNOSIS — Z01.818 PREOPERATIVE EXAMINATION: Primary | ICD-10-CM

## 2024-12-04 DIAGNOSIS — R94.31 ABNORMAL EKG: ICD-10-CM

## 2024-12-04 DIAGNOSIS — Z01.818 PREOPERATIVE EXAMINATION: ICD-10-CM

## 2024-12-04 DIAGNOSIS — Z86.711 HISTORY OF PULMONARY EMBOLISM: ICD-10-CM

## 2024-12-04 DIAGNOSIS — R73.01 ELEVATED FASTING BLOOD SUGAR: ICD-10-CM

## 2024-12-04 DIAGNOSIS — M48.062 SPINAL STENOSIS OF LUMBAR REGION WITH NEUROGENIC CLAUDICATION: ICD-10-CM

## 2024-12-04 DIAGNOSIS — D50.8 OTHER IRON DEFICIENCY ANEMIA: ICD-10-CM

## 2024-12-04 LAB
ALBUMIN SERPL-MCNC: 4.7 G/DL (ref 3.2–4.8)
ALBUMIN/GLOB SERPL: 1.9 {RATIO} (ref 1–2)
ALP LIVER SERPL-CCNC: 55 U/L
ALT SERPL-CCNC: 38 U/L
ANION GAP SERPL CALC-SCNC: 5 MMOL/L (ref 0–18)
APTT PPP: 25.8 SECONDS (ref 23–36)
AST SERPL-CCNC: 17 U/L (ref ?–34)
ATRIAL RATE: 74 BPM
BASOPHILS # BLD AUTO: 0.02 X10(3) UL (ref 0–0.2)
BASOPHILS NFR BLD AUTO: 0.3 %
BILIRUB SERPL-MCNC: 0.3 MG/DL (ref 0.3–1.2)
BILIRUB UR QL: NEGATIVE
BUN BLD-MCNC: 7 MG/DL (ref 9–23)
BUN/CREAT SERPL: 9 (ref 10–20)
CALCIUM BLD-MCNC: 10 MG/DL (ref 8.7–10.4)
CHLORIDE SERPL-SCNC: 109 MMOL/L (ref 98–112)
CLARITY UR: CLEAR
CO2 SERPL-SCNC: 27 MMOL/L (ref 21–32)
CREAT BLD-MCNC: 0.78 MG/DL
DEPRECATED RDW RBC AUTO: 41.1 FL (ref 35.1–46.3)
EGFRCR SERPLBLD CKD-EPI 2021: 100 ML/MIN/1.73M2 (ref 60–?)
EOSINOPHIL # BLD AUTO: 0.11 X10(3) UL (ref 0–0.7)
EOSINOPHIL NFR BLD AUTO: 1.5 %
ERYTHROCYTE [DISTWIDTH] IN BLOOD BY AUTOMATED COUNT: 16.1 % (ref 11–15)
EST. AVERAGE GLUCOSE BLD GHB EST-MCNC: 134 MG/DL (ref 68–126)
FASTING STATUS PATIENT QL REPORTED: YES
GLOBULIN PLAS-MCNC: 2.5 G/DL (ref 2–3.5)
GLUCOSE BLD-MCNC: 90 MG/DL (ref 70–99)
GLUCOSE UR-MCNC: NORMAL MG/DL
HBA1C MFR BLD: 6.3 % (ref ?–5.7)
HCT VFR BLD AUTO: 34 %
HGB BLD-MCNC: 10.8 G/DL
HGB UR QL STRIP.AUTO: NEGATIVE
IMM GRANULOCYTES # BLD AUTO: 0.04 X10(3) UL (ref 0–1)
IMM GRANULOCYTES NFR BLD: 0.5 %
INR BLD: 0.98 (ref 0.8–1.2)
KETONES UR-MCNC: NEGATIVE MG/DL
LEUKOCYTE ESTERASE UR QL STRIP.AUTO: NEGATIVE
LYMPHOCYTES # BLD AUTO: 2.49 X10(3) UL (ref 1–4)
LYMPHOCYTES NFR BLD AUTO: 33.7 %
MCH RBC QN AUTO: 22.5 PG (ref 26–34)
MCHC RBC AUTO-ENTMCNC: 31.8 G/DL (ref 31–37)
MCV RBC AUTO: 70.8 FL
MONOCYTES # BLD AUTO: 0.36 X10(3) UL (ref 0.1–1)
MONOCYTES NFR BLD AUTO: 4.9 %
NEUTROPHILS # BLD AUTO: 4.36 X10 (3) UL (ref 1.5–7.7)
NEUTROPHILS # BLD AUTO: 4.36 X10(3) UL (ref 1.5–7.7)
NEUTROPHILS NFR BLD AUTO: 59.1 %
NITRITE UR QL STRIP.AUTO: NEGATIVE
OSMOLALITY SERPL CALC.SUM OF ELEC: 290 MOSM/KG (ref 275–295)
P AXIS: 47 DEGREES
P-R INTERVAL: 172 MS
PH UR: 6 [PH] (ref 5–8)
PLATELET # BLD AUTO: 306 10(3)UL (ref 150–450)
POTASSIUM SERPL-SCNC: 4 MMOL/L (ref 3.5–5.1)
PROT SERPL-MCNC: 7.2 G/DL (ref 5.7–8.2)
PROT UR-MCNC: NEGATIVE MG/DL
PROTHROMBIN TIME: 13.6 SECONDS (ref 11.6–14.8)
Q-T INTERVAL: 364 MS
QRS DURATION: 90 MS
QTC CALCULATION (BEZET): 404 MS
R AXIS: 30 DEGREES
RBC # BLD AUTO: 4.8 X10(6)UL
SODIUM SERPL-SCNC: 141 MMOL/L (ref 136–145)
SP GR UR STRIP: 1.01 (ref 1–1.03)
T AXIS: 22 DEGREES
UROBILINOGEN UR STRIP-ACNC: NORMAL
VENTRICULAR RATE: 74 BPM
WBC # BLD AUTO: 7.4 X10(3) UL (ref 4–11)

## 2024-12-04 PROCEDURE — 99203 OFFICE O/P NEW LOW 30 MIN: CPT | Performed by: FAMILY MEDICINE

## 2024-12-04 PROCEDURE — 3008F BODY MASS INDEX DOCD: CPT | Performed by: FAMILY MEDICINE

## 2024-12-04 PROCEDURE — 83036 HEMOGLOBIN GLYCOSYLATED A1C: CPT | Performed by: FAMILY MEDICINE

## 2024-12-04 PROCEDURE — 87081 CULTURE SCREEN ONLY: CPT | Performed by: FAMILY MEDICINE

## 2024-12-04 PROCEDURE — 85730 THROMBOPLASTIN TIME PARTIAL: CPT | Performed by: FAMILY MEDICINE

## 2024-12-04 PROCEDURE — 85610 PROTHROMBIN TIME: CPT | Performed by: FAMILY MEDICINE

## 2024-12-04 PROCEDURE — 81003 URINALYSIS AUTO W/O SCOPE: CPT | Performed by: FAMILY MEDICINE

## 2024-12-04 PROCEDURE — 80053 COMPREHEN METABOLIC PANEL: CPT | Performed by: FAMILY MEDICINE

## 2024-12-04 PROCEDURE — 3074F SYST BP LT 130 MM HG: CPT | Performed by: FAMILY MEDICINE

## 2024-12-04 PROCEDURE — 85025 COMPLETE CBC W/AUTO DIFF WBC: CPT | Performed by: FAMILY MEDICINE

## 2024-12-04 PROCEDURE — 3078F DIAST BP <80 MM HG: CPT | Performed by: FAMILY MEDICINE

## 2024-12-04 NOTE — TELEPHONE ENCOUNTER
Dr. Mckeon, please review:    Labs- BUN (7), BUN/Crea (9), A1C (6.3), MRSA neg, all other labs WNL    EKG- done 09/22/24- sinus tachycardia, nonspecific T wave abnormality. Scanned to Cardio tab 12/04/24    X-ray- CT chest/abdomen/pelvis done 09/22/24     Hematology- Dr. Alva Alba  Hematology recommendations scanned into Media 12/04/24:  \"Prophylactic anticoagulation following any orthopedic procedure. May give Lovenox 40mg subcutaneous daily x10 days- or until she is ambulatory, first dose post op- OR- Pradaxa 110mg the night of the operation, as long as this is cleared by her orthopedist and then 220mg the following morning and daily for 10 days or until she is ambulatory.    OK for surgery?

## 2024-12-05 NOTE — TELEPHONE ENCOUNTER
Recommend lovenox 40 mg daily for 10 days to start 3 days post op.     Chart and results reviewed and are acceptable for surgery. Pt is medically clear to proceed with surgery as planned.

## 2024-12-05 NOTE — TELEPHONE ENCOUNTER
Spoke to patient, went over test results and let her know she is clear for surgery per Dr. Mckeon. Patient will discuss A1C with PCP, was previously on Metformin and may discuss going back on.     LM for Dr. Horton to see if they are sending in Lovenox or we will need to.

## 2024-12-05 NOTE — H&P
HPI:                                                                                                                                           PRE-OP NOTE  HISTORY AND PHYSICAL                                                                                                                                                                                                                                         I have been consulted by Dr. Horton to see Patricia Gauthier 39 year old female for a preoperative evaluation and medical clearance. Patricia has a history of worsening severe degenerative lumbar spinal stenosis. Patient is to have L5-S1 microdiscectomy by Dr. Horton on 12/9/24 at [facility] [place].     Pt suffers significant pain and loss of function.     No cardiopulmonary symptoms.     No history of MARY KAY. Denies tobacco use. She had a pulmonary embolism in past and sees hematology. Recommendation are for lovenox post op when ok with Dr. Horton.       Family History   Problem Relation Age of Onset    Diabetes Father     Heart Disorder Father     Hypertension Father     Diabetes Mother     Heart Disorder Mother     Cancer Mother         liver cancer    Diabetes Paternal Grandfather     Hypertension Paternal Grandfather     Breast Cancer Maternal Grandmother         in her 40's    Breast Cancer Other         Paternal Uncle breast cancer 50's    Glaucoma Neg     Macular degeneration Neg       Current Outpatient Medications   Medication Sig Dispense Refill    fluticasone-salmeterol (ADVAIR HFA) 230-21 MCG/ACT Inhalation Aerosol Inhale 1 puff into the lungs 2 (two) times daily. 12 g 1    albuterol 108 (90 Base) MCG/ACT Inhalation Aero Soln Inhale 2 puffs into the lungs 4 (four) times daily as needed for Shortness of Breath.       Past Medical History:    Anemia    Asthma (HCC)    Bowel obstruction (HCC)    Chronic hepatitis (HCC)    COVID    9/2021    Diabetes mellitus (HCC)    Elevated LFTs    History of blood transfusion     EMH- due to anemia- no reaction    JUAQUIN (iron deficiency anemia)    Knee swelling    Osteoarthritis    Peutz-Jeghers polyps of small bowel (HCC)    based on workup, suspected diagnosis. Treatment ongoing at [facility]-Dr. Nicholas    Pulmonary embolism (HCC)    Pyelonephritis    bilateral    Small bowel polyp    MULTIPLE noted in small bowel on VCE> referred for enteroscopy    Thalassemia alpha carrier     Past Surgical History:   Procedure Laterality Date    Anesth, section  2009    x1    Bowel resection  2020    Bowel resection with polyp removal    Colectomy   and     Colonoscopy      with polyp removal    Hysterectomy  2017    partial    Knee surgery Left     hardware removed from left knee    Needle biopsy right  10/19/2020    MRI bx right breast     Other surgical history      Bowel obstruction-at birth    Other surgical history      Colon resection for bowel obstruction-one month after colon mass removed.    Tonsillectomy      Total abdominal hysterectomy N/A 2018    Procedure: ABDOMINAL HYSTERECTOMY;  Surgeon: Earnest Carrera MD;  Location[facility] MAIN OR     Social History     Socioeconomic History    Marital status: Single     Spouse name: Not on file    Number of children: Not on file    Years of education: Not on file    Highest education level: Not on file   Occupational History    Not on file   Tobacco Use    Smoking status: Never     Passive exposure: Never    Smokeless tobacco: Never   Vaping Use    Vaping status: Never Used   Substance and Sexual Activity    Alcohol use: Yes     Comment: rarely    Drug use: No    Sexual activity: Yes     Partners: Male   Other Topics Concern     Service Not Asked    Blood Transfusions Not Asked    Caffeine Concern Yes     Comment: Tea, soda    Occupational Exposure Not Asked    Hobby Hazards Not Asked    Sleep Concern Not Asked    Stress Concern Not Asked    Weight Concern Not Asked    Special Diet Not Asked    Back  Care Not Asked    Exercise Not Asked    Bike Helmet Not Asked    Seat Belt Not Asked    Self-Exams Not Asked   Social History Narrative    Not on file     Social Drivers of Health     Financial Resource Strain: Not on file   Food Insecurity: Low Risk  (9/23/2024)    Received from [facility]    Food Insecurity     Have there been times that your food ran out, and you didn't have money to get more?: No     Are there times that you worry that this might happen?: No   Transportation Needs: Low Risk  (9/23/2024)    Received from [facility]    Transportation Needs     Do you have trouble getting transportation to medical appointments?: No     How do you normally get to and from your appointments?: Not on file   Physical Activity: Not on file   Stress: Not on file   Social Connections: Unknown (3/13/2021)    [facility]    Social Connections     Conversations with friends/family/neighbors per week: Not on file   Housing Stability: Low Risk  (9/23/2024)    Received from [facility]    Housing Stability     Are you worried that your electric, gas, oil, or water might be shut off?: No     Are you concerned about having a safe and reliable place to live?: No       REVIEW OF SYSTEMS:   CONSTITUTIONAL:  Denies unusual weight gain/loss, fever, chills  EENT:  Eyes:  Denies eye pain, visual loss, blurred vision, double vision. Ears, Nose, Throat:  Denies congestion, runny nose or sore throat.  INTEGUMENTARY:  Denies rashes, itching, skin lesion,   CARDIOVASCULAR: history of PE. Denies chest pain, palpitations, edema, dyspnea  RESPIRATORY: no MARY KAY ,Denies shortness of breath, wheezing, cough  GASTROINTESTINAL:  Denies abdominal pain, nausea, vomiting, constipation, diarrhea, or blood in stool.  MUSCULOSKELETAL:  severe pain as noted above  NEUROLOGICAL:  Denies headache, seizures, dizziness, syncope,  paralysis, ataxia,  HEMATOLOGIC:  Denies anemia, bleeding or bruising.  LYMPHATICS:  Denies enlarged nodes   PSYCHIATRIC:  Denies depression or anxiety.  ENDOCRINOLOGIC: DM 2 has been present in past.   ALLERGIES:  Denies allergic response, history of asthma, hives,     EXAM:   /76 (BP Location: Left arm)   Pulse 88   Resp 16   Ht 5' 8.5\" (1.74 m)   Wt 174 lb (78.9 kg)   LMP 11/05/2017 (Approximate)   SpO2 99%   BMI 26.07 kg/m²  Estimated body mass index is 26.07 kg/m² as calculated from the following:    Height as of this encounter: 5' 8.5\" (1.74 m).    Weight as of this encounter: 174 lb (78.9 kg).   Vital signs reviewed.Appears stated age, well groomed.  Physical Exam:  GEN:  Patient is alert, awake and oriented, well developed, well nourished, no apparent distress.  HEENT:  Head:  Normocephalic, atraumatic Eyes: EOMI, no scleral icterus, conjunctivae clear bilaterally, no eye discharge Nose: patent, no nasal discharge   NECK: Supple, no CLAD, no carotid bruit, no thyromegaly.  SKIN: No rashes, no skin lesion, no bruising, good turgor.  HEART:  Regular rate and rhythm, no murmurs, rubs or gallops.  LUNGS: Clear to auscultation bilterally, no rales/rhonchi/wheezing.  CHEST: No tenderness.  ABDOMEN:  Soft, nondistended, nontender,  no masses, no hepatosplenomegaly.  BACK: No tenderness, no spasm,   EXTREMITIES:  No edema, no cyanosis, no clubbing,   NEURO:  No focal deficit, speech fluent, normal gait, strength and tone, sensory intact      Lab Results   Component Value Date    WBC 7.4 12/04/2024    HGB 10.8 (L) 12/04/2024    HCT 34.0 (L) 12/04/2024    .0 12/04/2024    CREATSERUM 0.78 12/04/2024    BUN 7 (L) 12/04/2024     12/04/2024    K 4.0 12/04/2024     12/04/2024    CO2 27.0 12/04/2024    GLU 90 12/04/2024    CA 10.0 12/04/2024    ALB 4.7 12/04/2024    ALKPHO 55 12/04/2024    BILT 0.3 12/04/2024    TP 7.2 12/04/2024    AST 17 12/04/2024    ALT 38 12/04/2024    PTT 25.8  12/04/2024    INR 0.98 12/04/2024    PT 14.0 09/23/2016    TSH 1.260 10/30/2023    DDIMER 0.94 (H) 09/14/2021    TROP <0.045 09/14/2021       No results found.    EKG 12 Lead    Result Date: 12/4/2024  Normal sinus rhythm Normal ECG When compared with ECG of 03-JUN-2024 08:27, No significant change was found Confirmed by Hiro Dubose ([id number]) on 12/4/2024 4:30:28 PM     ASSESSMENT AND PLAN:   Patricia Gauthier is a 39 year old female, with a hx of severe degenerative lumbar spinal stenosis. Patient is to have zL5-S1 microdiscectomy by Dr. Horton on 12/9/24.                 Spinal stenosis of lumbar region with neurogenic claudication  M48.062        History of pulmonary embolism  Z86.711        Other iron deficiency anemia  D50.8          ECG and labs have been reviewed and are in acceptable range for surgery.     Preoperative Risk Stratification: There are no decompensated medical conditions. ASA classification 2    Patient has an RCRI score that is low risk for major cardiac event in the perioperative period.     Patient is medically optimized and has an acceptable risk of surgery and may proceed with surgery as planned.     PLAN:    Patient to discontinue medications and supplements with anticoagulation properties as per instruction.      Postoperative Recommendations:    Anticoagulation / DVT prophylaxis: SCDs, as per Dr. Horton. Early ambulation. Recommendation is for lovenox 40 mg daily for 10 days post op when ok with Dr. Horton.      GI protection: Protonix  Incentive Spirometry   Telemetry as needed    Pain management and Physical therapy as per Orthopedic service.   Home Health as needed    Thank you for the opportunity to care for your patient and to assist in managing the postoperative course.        Kendrick Mckeon MD  12/4/2024  8:53 PM

## 2025-01-28 ENCOUNTER — TELEMEDICINE (OUTPATIENT)
Dept: FAMILY MEDICINE CLINIC | Facility: CLINIC | Age: 40
End: 2025-01-28
Payer: COMMERCIAL

## 2025-01-28 DIAGNOSIS — Z86.2 HISTORY OF ANEMIA: ICD-10-CM

## 2025-01-28 DIAGNOSIS — R42 DIZZINESS: Primary | ICD-10-CM

## 2025-01-28 DIAGNOSIS — E11.65 TYPE 2 DIABETES MELLITUS WITH HYPERGLYCEMIA, WITHOUT LONG-TERM CURRENT USE OF INSULIN (HCC): ICD-10-CM

## 2025-01-28 DIAGNOSIS — I10 HYPERTENSION, UNSPECIFIED TYPE: ICD-10-CM

## 2025-01-28 DIAGNOSIS — R35.0 URINARY FREQUENCY: ICD-10-CM

## 2025-01-28 PROCEDURE — 98005 SYNCH AUDIO-VIDEO EST LOW 20: CPT

## 2025-01-28 RX ORDER — BLOOD-GLUCOSE METER
1 EACH MISCELLANEOUS ONCE
Qty: 1 KIT | Refills: 0 | Status: SHIPPED | OUTPATIENT
Start: 2025-01-28 | End: 2025-01-28

## 2025-01-28 RX ORDER — BLOOD SUGAR DIAGNOSTIC
1 STRIP MISCELLANEOUS DAILY
Qty: 90 STRIP | Refills: 0 | Status: SHIPPED | OUTPATIENT
Start: 2025-01-28

## 2025-01-28 NOTE — PROGRESS NOTES
This visit is conducted using Telemedicine with live, interactive video and audio during this Coronavirus pandemic.    Please note that the following visit was completed using two-way, real-time interactive audio and/or video communication.  This has been done in good chuck to provide continuity of care in the best interest of the provider-patient relationship, due to the ongoing public health crisis/national emergency and because of restrictions of visitation.  There are limitations of this visit as no physical exam could be performed.  Every conscious effort was taken to allow for sufficient and adequate time.  This billing was spent on reviewing labs, medications, radiology tests and decision making.  Appropriate medical decision-making and tests are ordered as detailed in the plan of care below    JUAN JOSE HSU or legal guardian   verbally consents to a Virtual/Telephone/ VideoCheck-In service on 1/28/2025  Patient understands and accepts financial responsibility for any deductible, co-insurance and/or co-pays associated with this service.      HPI:   HPI  Juan Jose Hsu is a 39 year old female.    Patient schedule virtual visit with complaint of intermittent dizziness, headache, and vision changes, started 2 weeks ago.  Also complains of increased hunger, thirst, and urinary frequency, unsure if related to elevated blood glucose (patient has no glucose monitor at home).  Reports history of diabetes type 2 and hypertension.  Was previously taking metformin, losartan 50 mg once daily, and hydrochlorothiazide 12.5 daily, but medication discontinued in September 2024 after patient contracted a severe kidney infection that required hospitalization.  Has blood pressure monitor at home, machine currently not working.  Also has history of anemia that required iron infusions but was stopped due to elevated ferritin levels.  Per patient, she is scheduled for follow-up appointment with hematologist today at 1:30  PM.    States she also had to major surgeries on spine/knee recently, unsure if related to dizziness.      ROS  A comprehensive 10 point review of systems was completed.  Pertinent positives and negatives noted in the the HPI.    Review of Systems   Constitutional: Negative.    Eyes:  Positive for visual disturbance.   Respiratory: Negative.     Cardiovascular: Negative.    Endocrine: Positive for polydipsia, polyphagia and polyuria.   Skin: Negative.    Neurological:  Positive for dizziness and headaches.   Psychiatric/Behavioral: Negative.        PATIENTS DENIES ANY KNOWN EXPOSURE TO ANYONE CONFIRMED FOR COVID 19.      Patient Active Problem List   Diagnosis    Severe anemia    Anticoagulation adequate    History of pulmonary embolism    Iron deficiency anemia due to chronic blood loss    Iron deficiency anemia    Excessive bleeding in premenopausal period    Other pulmonary embolism without acute cor pulmonale (HCC)    Left corneal abrasion    Poor iron absorption (HCC)    S/P small bowel resection    Chronic idiopathic constipation    Irritable bowel syndrome with constipation    Primary osteoarthritis of left knee    Spinal stenosis of lumbar region with neurogenic claudication       Past Medical History:    Anemia    Asthma (HCC)    Bowel obstruction (HCC)    Chronic hepatitis (HCC)    COVID    9/2021    Diabetes mellitus (HCC)    Elevated LFTs    History of blood transfusion    EMH- due to anemia- no reaction    JUAQUIN (iron deficiency anemia)    Knee swelling    Osteoarthritis    Peutz-Jeghers polyps of small bowel (HCC)    based on workup, suspected diagnosis. Treatment ongoing at [facility]    Pulmonary embolism (HCC)    Pyelonephritis    bilateral    Small bowel polyp    MULTIPLE noted in small bowel on VCE> referred for enteroscopy    Thalassemia alpha carrier           MEDICATION LIST    Current Outpatient Medications:     Blood Glucose Monitoring Suppl (ACCU-CHEK GUIDE ME) w/Device Does not apply  Kit, 1 each one time for 1 dose. Check blood glucose daily, Disp: 1 kit, Rfl: 0    Glucose Blood (GLUCOSE METER TEST) In Vitro Strip, 1 each by Finger stick route daily., Disp: 90 strip, Rfl: 0    fluticasone-salmeterol (ADVAIR HFA) 230-21 MCG/ACT Inhalation Aerosol, Inhale 1 puff into the lungs 2 (two) times daily., Disp: 12 g, Rfl: 1    albuterol 108 (90 Base) MCG/ACT Inhalation Aero Soln, Inhale 2 puffs into the lungs 4 (four) times daily as needed for Shortness of Breath., Disp: , Rfl:     ALLERGY LIST  Allergies[1]    Allergies:Allergies[2]    PHYSICAL EXAM:     Vitals signs taken at home if available:  Physical Exam  Constitutional:       General: She is not in acute distress.     Appearance: Normal appearance. She is not ill-appearing.   Pulmonary:      Effort: Pulmonary effort is normal. No respiratory distress.   Skin:     General: Skin is warm.      Findings: No rash.   Neurological:      General: No focal deficit present.      Mental Status: She is alert.   Psychiatric:         Mood and Affect: Mood normal.         Behavior: Behavior normal.         Thought Content: Thought content normal.         Judgment: Judgment normal.        LMP 11/05/2017 (Approximate)       Limited examination for this telephone/ video visit due to the coronavirus emergency    ASSESSMENT/PLAN:     Encounter Diagnoses   Name Primary?    Dizziness Yes    History of anemia     Type 2 diabetes mellitus with hyperglycemia, without long-term current use of insulin (HCC)     Hypertension, unspecified type     Urinary frequency        1. Dizziness  -Suspect related to hyperglycemia versus hypertension versus anemia versus other  -Testing ordered as listed below, will await results  - TSH W Reflex To Free T4 [E]; Future    2. History of anemia  -See above  - Ferritin [E]; Future  - Iron And Tibc [E]; Future    3. Type 2 diabetes mellitus with hyperglycemia, without long-term current use of insulin (HCC)  -Testing ordered as listed below,  will await results  - Hemoglobin A1C [E]; Future  - CBC With Differential With Platelet; Future  - Microalb/Creat Ratio, Random Urine; Future  - Blood Glucose Monitoring Suppl (ACCU-CHEK GUIDE ME) w/Device Does not apply Kit; 1 each one time for 1 dose. Check blood glucose daily  Dispense: 1 kit; Refill: 0  - Glucose Blood (GLUCOSE METER TEST) In Vitro Strip; 1 each by Finger stick route daily.  Dispense: 90 strip; Refill: 0    4. Hypertension, unspecified type  -Patient unable to get blood pressure monitor working during visit.  Advised patient to get new batteries and monitor and send [employer] message with blood pressure reading    5. Urinary frequency  - Urinalysis with Culture Reflex; Future        Patient reminded to practice good health and safety measures including washing hands, social distancing, covering mouth when coughing/ sneezing, avoid social meetings/ gatherings in face of this Covid 19 pandemic.    Patient verbalized understanding of plan and all questions answered to the best of my ability.    Patient to call back if any change/ worsening of symptoms.  If symptoms get worse go to the emergency room.       Orders Placed This Encounter   Procedures    Hemoglobin A1C [E]    CBC With Differential With Platelet    Ferritin [E]    Iron And Tibc [E]    TSH W Reflex To Free T4 [E]    Urinalysis with Culture Reflex    Microalb/Creat Ratio, Random Urine       Meds This Visit:  Requested Prescriptions     Signed Prescriptions Disp Refills    Blood Glucose Monitoring Suppl (ACCU-CHEK GUIDE ME) w/Device Does not apply Kit 1 kit 0     Si each one time for 1 dose. Check blood glucose daily    Glucose Blood (GLUCOSE METER TEST) In Vitro Strip 90 strip 0     Si each by Finger stick route daily.       Imaging & Referrals:  None        Bronwyn Nazario, APRN    2025  10:28 AM         [1] No Known Allergies  [2] No Known Allergies

## 2025-01-29 ENCOUNTER — LAB ENCOUNTER (OUTPATIENT)
Dept: LAB | Facility: HOSPITAL | Age: 40
End: 2025-01-29
Payer: COMMERCIAL

## 2025-01-29 DIAGNOSIS — R35.0 URINARY FREQUENCY: ICD-10-CM

## 2025-01-29 DIAGNOSIS — E11.65 TYPE 2 DIABETES MELLITUS WITH HYPERGLYCEMIA, WITHOUT LONG-TERM CURRENT USE OF INSULIN (HCC): ICD-10-CM

## 2025-01-29 DIAGNOSIS — R42 DIZZINESS: ICD-10-CM

## 2025-01-29 DIAGNOSIS — Z86.2 HISTORY OF ANEMIA: ICD-10-CM

## 2025-01-29 LAB
BASOPHILS # BLD AUTO: 0.04 X10(3) UL (ref 0–0.2)
BASOPHILS NFR BLD AUTO: 0.4 %
BILIRUB UR QL: NEGATIVE
CLARITY UR: CLEAR
CREAT UR-SCNC: 117.6 MG/DL
DEPRECATED HBV CORE AB SER IA-ACNC: 489 NG/ML
DEPRECATED RDW RBC AUTO: 43.3 FL (ref 35.1–46.3)
EOSINOPHIL # BLD AUTO: 0.2 X10(3) UL (ref 0–0.7)
EOSINOPHIL NFR BLD AUTO: 2.1 %
ERYTHROCYTE [DISTWIDTH] IN BLOOD BY AUTOMATED COUNT: 17.6 % (ref 11–15)
EST. AVERAGE GLUCOSE BLD GHB EST-MCNC: 154 MG/DL (ref 68–126)
GLUCOSE UR-MCNC: NORMAL MG/DL
HBA1C MFR BLD: 7 % (ref ?–5.7)
HCT VFR BLD AUTO: 37.3 %
HGB BLD-MCNC: 11.8 G/DL
HGB UR QL STRIP.AUTO: NEGATIVE
IMM GRANULOCYTES # BLD AUTO: 0.09 X10(3) UL (ref 0–1)
IMM GRANULOCYTES NFR BLD: 0.9 %
IRON SATN MFR SERPL: 23 %
IRON SERPL-MCNC: 63 UG/DL
KETONES UR-MCNC: NEGATIVE MG/DL
LEUKOCYTE ESTERASE UR QL STRIP.AUTO: NEGATIVE
LYMPHOCYTES # BLD AUTO: 3.67 X10(3) UL (ref 1–4)
LYMPHOCYTES NFR BLD AUTO: 38.1 %
MCH RBC QN AUTO: 22.1 PG (ref 26–34)
MCHC RBC AUTO-ENTMCNC: 31.6 G/DL (ref 31–37)
MCV RBC AUTO: 70 FL
MICROALBUMIN UR-MCNC: <0.3 MG/DL
MONOCYTES # BLD AUTO: 0.57 X10(3) UL (ref 0.1–1)
MONOCYTES NFR BLD AUTO: 5.9 %
NEUTROPHILS # BLD AUTO: 5.06 X10 (3) UL (ref 1.5–7.7)
NEUTROPHILS # BLD AUTO: 5.06 X10(3) UL (ref 1.5–7.7)
NEUTROPHILS NFR BLD AUTO: 52.6 %
NITRITE UR QL STRIP.AUTO: NEGATIVE
PH UR: 6 [PH] (ref 5–8)
PLATELET # BLD AUTO: 355 10(3)UL (ref 150–450)
PROT UR-MCNC: NEGATIVE MG/DL
RBC # BLD AUTO: 5.33 X10(6)UL
SP GR UR STRIP: 1.02 (ref 1–1.03)
TOTAL IRON BINDING CAPACITY: 270 UG/DL (ref 250–425)
TRANSFERRIN SERPL-MCNC: 206 MG/DL (ref 250–380)
TSI SER-ACNC: 0.66 UIU/ML (ref 0.55–4.78)
UROBILINOGEN UR STRIP-ACNC: NORMAL
WBC # BLD AUTO: 9.6 X10(3) UL (ref 4–11)

## 2025-01-29 PROCEDURE — 85025 COMPLETE CBC W/AUTO DIFF WBC: CPT

## 2025-01-29 PROCEDURE — 82570 ASSAY OF URINE CREATININE: CPT

## 2025-01-29 PROCEDURE — 83540 ASSAY OF IRON: CPT

## 2025-01-29 PROCEDURE — 82728 ASSAY OF FERRITIN: CPT

## 2025-01-29 PROCEDURE — 36415 COLL VENOUS BLD VENIPUNCTURE: CPT

## 2025-01-29 PROCEDURE — 82043 UR ALBUMIN QUANTITATIVE: CPT

## 2025-01-29 PROCEDURE — 83036 HEMOGLOBIN GLYCOSYLATED A1C: CPT

## 2025-01-29 PROCEDURE — 84466 ASSAY OF TRANSFERRIN: CPT

## 2025-01-29 PROCEDURE — 81003 URINALYSIS AUTO W/O SCOPE: CPT

## 2025-01-29 PROCEDURE — 84443 ASSAY THYROID STIM HORMONE: CPT

## 2025-01-31 RX ORDER — NAPROXEN SODIUM 220 MG
1 TABLET ORAL DAILY
Qty: 1 KIT | Refills: 1 | Status: CANCELLED | OUTPATIENT
Start: 2025-01-31

## 2025-01-31 RX ORDER — GLUCOSAM/CHON-MSM1/C/MANG/BOSW 500-416.6
1 TABLET ORAL
Refills: 3 | Status: CANCELLED | OUTPATIENT
Start: 2025-01-31

## 2025-01-31 RX ORDER — BLOOD-GLUCOSE CONTROL, LOW
1 EACH MISCELLANEOUS WEEKLY
Qty: 1 EACH | Refills: 3 | Status: CANCELLED | OUTPATIENT
Start: 2025-01-31

## 2025-01-31 RX ORDER — GLUCOSAM/CHON-MSM1/C/MANG/BOSW 500-416.6
1 TABLET ORAL DAILY
Qty: 100 EACH | Refills: 3 | Status: SHIPPED | OUTPATIENT
Start: 2025-01-31

## 2025-01-31 RX ORDER — CALCIUM CITRATE/VITAMIN D3 200MG-6.25
1 TABLET ORAL
Refills: 3 | Status: CANCELLED | OUTPATIENT
Start: 2025-01-31

## 2025-01-31 NOTE — TELEPHONE ENCOUNTER
Please review; protocol failed/ has no protocol      Please see message below for upcoming appointment.    Future Appointments   Date Time Provider Department Center   3/19/2025 10:30 AM Lorraine Mcnair MD [facility]       Requested Prescriptions   Pending Prescriptions Disp Refills    TRUEplus Lancets 33G Does not apply Misc 100 each 3     Si Lancet by Finger stick route daily.       Diabetic Supplies Protocol Failed - 2025  9:12 AM        Failed - Medication is active on med list        Passed - In person appointment or virtual visit in the past 12 mos or appointment in next 3 mos     Recent Outpatient [facility]its              [facility]    1 month ago Preoperative examination    [facility] Kendrick Wadsworth MD    Office Visit    8 months ago Preoperative examination    [facility] Keyshwan Washington MD    Office Visit    11 months ago Elevated hemoglobin A1c    [facility] Keyshawn Sinha MD    Office Visit    11 months ago Preoperative examination    [facility] Keyshawn Washington MD    Office Visit          Future Appointments         Provider Department Appt Notes    In 1 month Lorraine Mcnair MD [facility] Follow up                       Recent Outpatient [facility]its              [facility] ago [facility]    1 month ago Preoperative examination    [facility] Kendrick Wadsworth MD    Office Visit    8 months ago Preoperative examination    [facility] Keyshawn Washington MD    Office Visit    11 months ago Elevated hemoglobin A1c    [facility] Keyshawn Sinha MD    Office Visit    11 months ago Preoperative examination    [facility] Keyshawn Sinha MD    Office Visit          Future Appointments         Provider Department Appt Notes    In 1 month Lorraine Mcnair MD [facility] Follow up

## 2025-02-06 ENCOUNTER — TELEPHONE (OUTPATIENT)
Dept: FAMILY MEDICINE CLINIC | Facility: CLINIC | Age: 40
End: 2025-02-06

## 2025-02-06 DIAGNOSIS — E11.65 TYPE 2 DIABETES MELLITUS WITH HYPERGLYCEMIA, WITHOUT LONG-TERM CURRENT USE OF INSULIN (HCC): Primary | ICD-10-CM

## 2025-02-07 NOTE — TELEPHONE ENCOUNTER
Prior authorization for Farxiga completed through [facility], key [id number]

## 2025-02-11 NOTE — TELEPHONE ENCOUNTER
Farxiga Approved     Called [facility], patient already came and picked up the medication.

## 2025-03-11 RX ORDER — DAPAGLIFLOZIN 5 MG/1
5 TABLET, FILM COATED ORAL DAILY
Qty: 90 TABLET | Refills: 3 | Status: SHIPPED | OUTPATIENT
Start: 2025-03-11

## 2025-03-11 NOTE — TELEPHONE ENCOUNTER
Refill Per Protocol     Last fill new medication added      Dapagliflozin Propanediol 5 mg Oral Daily    Future Appointments   Date Time Provider Department Center   3/19/2025 10:30 AM Lorraine Mcnair MD [facility]

## 2025-03-19 ENCOUNTER — TELEPHONE (OUTPATIENT)
Dept: FAMILY MEDICINE CLINIC | Facility: CLINIC | Age: 40
End: 2025-03-19

## 2025-03-19 ENCOUNTER — TELEMEDICINE (OUTPATIENT)
Dept: FAMILY MEDICINE CLINIC | Facility: CLINIC | Age: 40
End: 2025-03-19
Payer: COMMERCIAL

## 2025-03-19 DIAGNOSIS — R82.90 ABNORMAL URINE ODOR: ICD-10-CM

## 2025-03-19 DIAGNOSIS — M62.89 PELVIC FLOOR DYSFUNCTION: ICD-10-CM

## 2025-03-19 DIAGNOSIS — M54.50 CHRONIC LOW BACK PAIN, UNSPECIFIED BACK PAIN LATERALITY, UNSPECIFIED WHETHER SCIATICA PRESENT: Primary | ICD-10-CM

## 2025-03-19 DIAGNOSIS — G47.00 INSOMNIA, UNSPECIFIED TYPE: ICD-10-CM

## 2025-03-19 DIAGNOSIS — L68.0 HIRSUTISM: ICD-10-CM

## 2025-03-19 DIAGNOSIS — G89.29 CHRONIC LOW BACK PAIN, UNSPECIFIED BACK PAIN LATERALITY, UNSPECIFIED WHETHER SCIATICA PRESENT: Primary | ICD-10-CM

## 2025-03-19 DIAGNOSIS — E11.65 TYPE 2 DIABETES MELLITUS WITH HYPERGLYCEMIA, WITHOUT LONG-TERM CURRENT USE OF INSULIN (HCC): ICD-10-CM

## 2025-03-19 DIAGNOSIS — K59.09 CHRONIC CONSTIPATION: ICD-10-CM

## 2025-03-19 PROCEDURE — 98007 SYNCH AUDIO-VIDEO EST HI 40: CPT | Performed by: STUDENT IN AN ORGANIZED HEALTH CARE EDUCATION/TRAINING PROGRAM

## 2025-03-19 RX ORDER — LORAZEPAM 0.5 MG/1
0.5 TABLET ORAL NIGHTLY PRN
Qty: 30 TABLET | Refills: 1 | Status: SHIPPED | OUTPATIENT
Start: 2025-03-19

## 2025-03-19 RX ORDER — SEMAGLUTIDE 0.68 MG/ML
0.25 INJECTION, SOLUTION SUBCUTANEOUS WEEKLY
Qty: 1 EACH | Refills: 12 | Status: SHIPPED | OUTPATIENT
Start: 2025-03-19

## 2025-03-19 RX ORDER — METHOCARBAMOL 750 MG/1
750 TABLET, FILM COATED ORAL 3 TIMES DAILY PRN
Qty: 60 TABLET | Refills: 0 | Status: SHIPPED | OUTPATIENT
Start: 2025-03-19

## 2025-03-19 NOTE — PROGRESS NOTES
Virtual Telephone Check-In    Patricia Gauthier verbally consents to a Virtual/Telephone Check-In visit on 03/19/25.  Patient has been referred to the Critical access hospital website at [email or web address] to review the yearly Consent to Treat document.    Patient understands and accepts financial responsibility for any deductible, co-insurance and/or co-pays associated with this service.    Duration of the service: 40 minutes      Summary of topics discussed:   Establish care    Lorraine Mcnair MD    Telehealth outside of [facility]  Telehealth Verbal Consent   I conducted a telehealth visit with Patricia Gauthier today, 03/19/25, which was completed using two-way, real-time interactive audio and video communication. This has been done in good chuck to provide continuity of care in the best interest of the provider-patient relationship, due to the COVID -19 public health crisis/national emergency where restrictions of face-to-face office visits are ongoing. Every conscious effort was taken to allow for sufficient and adequate time to complete the visit.  The patient was made aware of the limitations of the telehealth visit, including treatment limitations as no physical exam could be performed.  The patient was advised to call 911 or to go to the ER in case there was an emergency.  The patient was also advised of the potential privacy & security concerns related to the telehealth platform.   The patient was made aware of where to find Critical access hospital's notice of privacy practices, telehealth consent form and other related consent forms and documents.  which are located on the Critical access hospital website. The patient verbally agreed to telehealth consent form, related consents and the risks discussed.    Lastly, the patient confirmed that they were in [place].   Included in this visit, time may have been spent reviewing labs, medications, radiology tests and decision making. Appropriate medical decision-making and tests are ordered as detailed in the plan of  care above.  Coding/billing information is submitted for this visit based on complexity of care and/or time spent for the visit.    HPI:    Patient ID: Patricia Gauthier is a 39 year old female.    HPI  Pt presenting via video visit to establish care.    H/o iron deficiency anemia, prior infusions  H/o thalassemia trait  Cleared by [facility]    H/o chronic constipation  Followed by [facility]  Last surgery 2022 bowel resection, polyp removal  Cscope 2024: more polyps  Weak anometry?    Doing PT following low back surgery Dec 2024  Still with low back pain with sitting  Left knee will buckle  Prior knee surgery    H/o hysterectomy 2019  Reports pelvic odor    Reports weight gain  Facial hair  Frequent snacking    Sleep patterns changes  Poor sleep  Increased fatigue  Can't turn it off  Overnight awakenings       Review of Systems   A comprehensive 10 point review of systems was completed.  Pertinent positives and negatives noted in the the HPI.       Current Outpatient Medications   Medication Sig Dispense Refill    methocarbamol 750 MG Oral Tab Take 1 tablet (750 mg total) by mouth 3 (three) times daily as needed. 60 tablet 0    semaglutide (OZEMPIC, 0.25 OR 0.5 MG/DOSE,) 2 MG/3ML Subcutaneous Solution Pen-injector Inject 0.25 mg into the skin once a week. 1 each 12    LORazepam 0.5 MG Oral Tab Take 1 tablet (0.5 mg total) by mouth nightly as needed (sleep). 30 tablet 1    dapagliflozin (FARXIGA) 5 MG Oral Tab Take 1 tablet (5 mg total) by mouth daily. 90 tablet 3    Lancets 33G Does not apply Misc 1 each daily. 100 each 2    TRUEplus Lancets 33G Does not apply Misc 1 Lancet by Finger stick route daily. 100 each 3    Glucose Blood (GLUCOSE METER TEST) In Vitro Strip 1 each by Finger stick route daily. 90 strip 0    fluticasone-salmeterol (ADVAIR HFA) 230-21 MCG/ACT Inhalation Aerosol Inhale 1 puff into the lungs 2 (two) times daily. 12 g 1    albuterol 108 (90 Base) MCG/ACT Inhalation Aero Soln Inhale 2 puffs into  the lungs 4 (four) times daily as needed for Shortness of Breath.       Allergies:Allergies[1]   There were no vitals filed for this visit.    There is no height or weight on file to calculate BMI.   PHYSICAL EXAM:   Physical Exam   Vitals signs taken at home if available:     Limited examination for this telephone visit due to the coronavirus emergency     Patient was speaking in complete sentences, no increased work of breathing and very coherent and alert on the phone.  Alert and oriented x 3  Patient was responding to questions appropriately.  Patient did not appear short of breath.       ASSESSMENT/PLAN:   1. Chronic low back pain, unspecified back pain laterality, unspecified whether sciatica present  Continue PT  Discussed role of PFPT  Suspect iliopsoas tightness contributing to pain flares  Will trial muscle relaxer  - discussed red flags for urgent reevaluation  - to call with any questions/concerns  - Pelvic Floor Therapy - [facility]  - methocarbamol 750 MG Oral Tab; Take 1 tablet (750 mg total) by mouth 3 (three) times daily as needed.  Dispense: 60 tablet; Refill: 0    2. Chronic constipation  PFPT as above  - Pelvic Floor Therapy - [facility]    3. Pelvic floor dysfunction  Consider UroGyne eval  PFPT as above  - Urogynecology Referral - [facility] Network    4. Abnormal urine odor  Will check urine studies  - Urine Culture, Routine; Future  - Urinalysis, Routine; Future    5. Type 2 diabetes mellitus with hyperglycemia, without long-term current use of insulin ([facility])  Discussed treatment options   Last A1c 7.0 Jan 2025  Will start Ozempic -- counseled on side effects, dose titration, etc  - discussed healthy diet and lifestyle changes for overall wellness, which includes decreased carb and sugar intake, increased fiber intake, and increased water intake as tolerated, as well as regular exercise  - anticipate Ophtho eval  - semaglutide (OZEMPIC, 0.25 OR 0.5 MG/DOSE,) 2 MG/3ML Subcutaneous  Solution Pen-injector; Inject 0.25 mg into the skin once a week.  Dispense: 1 each; Refill: 12    6. Hirsutism  Will check labs  Discussed role of weight loss  - Testosterone,Total and Weakly Bound w/ SHBG; Future    7. Insomnia, unspecified type  Discussed treatment options  Will trial Ativan at bedtime  - discussed red flags for urgent reevaluation  - to call with any questions/concerns  - LORazepam 0.5 MG Oral Tab; Take 1 tablet (0.5 mg total) by mouth nightly as needed (sleep).  Dispense: 30 tablet; Refill: 1    Pt verbalized understanding and agrees with plan.    Orders Placed This Encounter   Procedures    Urinalysis, Routine    Testosterone,Total and Weakly Bound w/ SHBG    Urine Culture, Routine       Meds This Visit:  Requested Prescriptions     Signed Prescriptions Disp Refills    methocarbamol 750 MG Oral Tab 60 tablet 0     Sig: Take 1 tablet (750 mg total) by mouth 3 (three) times daily as needed.    semaglutide (OZEMPIC, 0.25 OR 0.5 MG/DOSE,) 2 MG/3ML Subcutaneous Solution Pen-injector 1 each 12     Sig: Inject 0.25 mg into the skin once a week.    LORazepam 0.5 MG Oral Tab 30 tablet 1     Sig: Take 1 tablet (0.5 mg total) by mouth nightly as needed (sleep).       Imaging & Referrals:  PELVIC FLOOR THERAPY - INTERNAL  OP REFERRAL TO UROGYNECOLOGY CLINIC         ID#[id number]           [1] No Known Allergies

## 2025-03-27 ENCOUNTER — PATIENT MESSAGE (OUTPATIENT)
Dept: FAMILY MEDICINE CLINIC | Facility: CLINIC | Age: 40
End: 2025-03-27

## 2025-03-31 NOTE — TELEPHONE ENCOUNTER
Still pending per CoverMyMeds: If Lisa has not replied to your urgent request within 1 business day, or standard request within 3 business days, please contact Lisa at [phone].    Called plan to check status, still under clinical review.   Marked as expedited, outcome to be sent to triage support by end of day today.

## 2025-04-01 NOTE — TELEPHONE ENCOUNTER
Per CoverMyMeds Ozempic approved: Approved. . Authorization Expiration Date: December 31, 2039.  Patient notified via mychart.

## 2025-04-02 NOTE — TELEPHONE ENCOUNTER
Dr Mcnair=see patient's response.     Please respond directly to the patient if no additional staff support is required.

## 2025-04-04 ENCOUNTER — MED REC SCAN ONLY (OUTPATIENT)
Dept: FAMILY MEDICINE CLINIC | Facility: CLINIC | Age: 40
End: 2025-04-04

## 2025-04-14 NOTE — PROGRESS NOTES
ID: Patricia Gauthier  : 1985  Date: 4/15/2025     Referred by Dr. Xu MD    Chief Complaint   Patient presents with    Pelvic Pain     Pelvic pain -long standing       HPI:  39 year old female, G7, Vaginal deliveries x3, c-setion x 1.  Who presents for evaluation of vaginal pain and dyspareunia, which is longstanding.   Has history of chronic constipation and extensive GI issues including dyspepsia, IBS-C with pain and bloating.  Follows up with GI and [facility] (Dr. Caryn Wadsworth MD). Had anal manometry on 2025 with dx of dyssynergic defecation.  Referred to pelvic floor PT but has not scheduled. Has tried fiber and Miralax with no relief. Was prescribed Ibsrela  but awaiting insurance approval.   Status post hysterectomy in 2019 for abnormal bleeding.  H/o bowel surgery as an infant. Had SBO s/p small bowel resection on 2020 at [facility].  Has history of recurrent UTIs with admissions for Pyelo and Urosepsis on 2024 (+ Klebsiella). Had pyelo again on 2025 (negative culture).   Baseline LUTS include voiding every 1-2 hours during the day and x 3 at night.   Has both UUI and DIANA  Has ongoing back pain, doing therapy for her back.     PMHx: DM (A1C 7.0),  Chronic back pain (status post back surgery on 24), h/o PE, HTN, asthma.     Urogynecology Summary:  Urogynecology Summary  Prolapse: No  DIANA: Yes (Reports leakage with laugh, cough, and sneeze.)  Urge Incontinence: Yes (Reports daily occurance.)  Nocturia Frequency: 3 (Reports 2 to 3 times per night.)  Frequency: 1 - 2 hours  Incomplete emptying: Yes (Reports sense of incomplete empting and shifts positions at times.)  Constipation: Yes (Hx. of IBSC. Saw GI ([facility]) perscribed Ibsrela 50 mg po bid. Has not started medication)  Wears pad day?: 5 (Liners are light. Wet throughout the day.)  Wears Pad Night?: 3  Currently Sexually Active: Yes  Avoids sexual activity due to:  (Reports dyspaurnia. Denies using lubricatrion.)          HISTORY:  Past  Medical History[1]   Past Surgical History[2]   Family History[3]   Short Social Hx on File[4]     Allergies:  Allergies[5]    Medications:  Medications Prior to Visit[6]    Review of Systems:    A comprehensive 12 point review of systems was completed.  Pertinent positives noted in the the HPI.  Denies CP  Denies SOB    Vitals:  Resp 18   Ht 68.5\"   Wt 174 lb (78.9 kg)   LMP 11/05/2017 (Approximate)   BMI 26.07 kg/m²        GENERAL EXAM:  GENERAL:  Alert and oriented. Well-nourished, normally developed.  Thought and emotional status are appropriate, speech is understandable.  No acute distress. Uncomfortable, in pain, difficult to sit.   HEAD: Normocephalic and atraumatic with normal hair distribution  LUNGS:  Normal respiratory effort.    ABDOMEN: Multiple surgical scars noted.  EXTREMITIES:  Without edema, varicosities or lesions.   SKIN:  Warm and dry, with good color and turgor. No lesions.    PELVIC EXAM: The patient consented verbally to the pelvic exam and procedures as needed. The chaperone present and witnessing exam was [facility].   External Genitalia: Normal appearance for age. No atrophy, no lesions  Urethra: no atrophy, non tender  Bladder:no fullness, non tender  Vagina: + atrophy, no lesions, +++ diffuse vaginal tenderness.    Cervix and uterus: absent.   Adnexa:no masses, non tender  Perineum: non tender  Anus: no hemorrhoids  Rectum: deferred.     PELVIS FLOOR NEUROMUSCULAR FUNCTION:  Strength:  Increased tone and Tender  Perineal Sensation:  Normal      PELVIC SUPPORT:  Cuttyhunk:  0  Ant:  0  Post:  0  CST:  negative  UVJ: no hypermobile    The patient voided 50 ml in the privacy of the bathroom.  She was catheterized utilizing sterile technique and a 14 french straight catheter for PVR of 5 ml.  Specimen sent for C&S.     Impression/Plan:    ICD-10-CM    1. Pelvic floor tension  M62.89       2. Dyspareunia in female  N94.10       3. Urge incontinence  N39.41 POCT urinalysis dipstick[65468]      Urine Culture, Routine      4. History of pyelonephritis  Z87.448       5. Constipation due to outlet dysfunction  K59.02       6. Chronic bilateral low back pain, unspecified whether sciatica present  M54.50     G89.29           Plan:  -The patient has a longstanding history of pain, constipation, incontinence, UTIs.   -No evidence of prolapse on exam.   -Continue IBS-c management per GI.  -Strongly recommend to schedule pelvic floor physical as previously recommend. Understands therapy is necessary for defecation retraining, myofascial pain relief and eventually pelvic floor muscle strengthening. She has referral and will schedule.  -Agrees to trial of Gemtesa 75 mg PO daily for OAB symptoms.  She is to avoid anticholinergic medications due to detrimental effects on bowel motility.   -Also recommend low dose antibiotic suppression with Cephalexin 250 mg x 3 per week.   -Pain management consult placed.   -Follow up in 6 weeks via televisit.     Complex decision making involved. Previous records reviewed. Labs and imagining studies reviewed. The patient verbalized understanding and agrees with plan of care.     Lou Aquino MD, FACOG, FACS  Urogynecology and Reconstructive Pelvic Surgery            [1]   Past Medical History:   Anemia    Asthma (HCC)    Bowel obstruction (HCC)    Chronic hepatitis (HCC)    COVID    2021    Diabetes mellitus (HCC)    Elevated LFTs    History of blood transfusion    [facility]- due to anemia- no reaction    JUAQUIN (iron deficiency anemia)    Knee swelling    Osteoarthritis    Peutz-Jeghers polyps of small bowel (HCC)    based on workup, suspected diagnosis. Treatment ongoing at [facility]-Dr. Nicholas    Pulmonary embolism (HCC)    Pyelonephritis    bilateral    Small bowel polyp    MULTIPLE noted in small bowel on VCE> referred for enteroscopy    Thalassemia alpha carrier   [2]   Past Surgical History:  Procedure Laterality Date    Anesth, section  2009    x1    Bowel resection   07/27/2020    Bowel resection with polyp removal    Colectomy  2011 and 1986    Colonoscopy  2022    with polyp removal    Hysterectomy  2017    partial    Knee surgery Left 2022    hardware removed from left knee    Needle biopsy right  10/19/2020    MRI bx right breast     Other surgical history  1986    Bowel obstruction-at birth    Other surgical history  2011    Colon resection for bowel obstruction-one month after colon mass removed.    Tonsillectomy      Total abdominal hysterectomy N/A 01/04/2018    Procedure: ABDOMINAL HYSTERECTOMY;  Surgeon: Earnest Carrera MD;  Location: [facility] MAIN OR   [3]   Family History  Problem Relation Age of Onset    Diabetes Father     Heart Disorder Father     Hypertension Father     Diabetes Mother     Heart Disorder Mother     Cancer Mother         liver cancer    Diabetes Paternal Grandfather     Hypertension Paternal Grandfather     Breast Cancer Maternal Grandmother         in her 40's    Breast Cancer Other         Paternal Uncle breast cancer 50's    Glaucoma Neg     Macular degeneration Neg    [4]   Social History  Socioeconomic History    Marital status: Single   Tobacco Use    Smoking status: Never     Passive exposure: Never    Smokeless tobacco: Never   Vaping Use    Vaping status: Never Used   Substance and Sexual Activity    Alcohol use: Yes     Comment: rarely    Drug use: No    Sexual activity: Yes     Partners: Male   Other Topics Concern    Caffeine Concern Yes     Comment: Tea, soda     Social Drivers of Health     Food Insecurity: Low Risk  (9/23/2024)    Received from [facility]    Food Insecurity     Have there been times that your food ran out, and you didn't have money to get more?: No     Are there times that you worry that this might happen?: No   Transportation Needs: Low Risk  (9/23/2024)    Received from [facility]    Transportation Needs     Do you have trouble getting transportation to medical  appointments?: No   Housing Stability: Low Risk  (9/23/2024)    Received from [facility]    Housing Stability     Are you worried that your electric, gas, oil, or water might be shut off?: No     Are you concerned about having a safe and reliable place to live?: No   [5] No Known Allergies  [6]   Outpatient Medications Prior to Visit   Medication Sig Dispense Refill    losartan 50 MG Oral Tab Take by mouth daily.      hydroCHLOROthiazide 12.5 MG Oral Cap Take 1 capsule (12.5 mg total) by mouth daily.      semaglutide (OZEMPIC, 0.25 OR 0.5 MG/DOSE,) 2 MG/3ML Subcutaneous Solution Pen-injector Inject 0.25 mg into the skin once a week. 1 each 12    LORazepam 0.5 MG Oral Tab Take 1 tablet (0.5 mg total) by mouth nightly as needed (sleep). 30 tablet 1    dapagliflozin (FARXIGA) 5 MG Oral Tab Take 1 tablet (5 mg total) by mouth daily. 90 tablet 3    Lancets 33G Does not apply Misc 1 each daily. 100 each 2    TRUEplus Lancets 33G Does not apply Misc 1 Lancet by Finger stick route daily. 100 each 3    Glucose Blood (GLUCOSE METER TEST) In Vitro Strip 1 each by Finger stick route daily. 90 strip 0    fluticasone-salmeterol (ADVAIR HFA) 230-21 MCG/ACT Inhalation Aerosol Inhale 1 puff into the lungs 2 (two) times daily. 12 g 1    albuterol 108 (90 Base) MCG/ACT Inhalation Aero Soln Inhale 2 puffs into the lungs as needed in the morning and 2 puffs as needed at noon and 2 puffs as needed in the evening and 2 puffs as needed before bedtime for Shortness of Breath.      methocarbamol 750 MG Oral Tab Take 1 tablet (750 mg total) by mouth 3 (three) times daily as needed. (Patient not taking: Reported on 4/15/2025) 60 tablet 0     No facility-administered medications prior to visit.

## 2025-04-15 ENCOUNTER — LAB ENCOUNTER (OUTPATIENT)
Dept: LAB | Facility: HOSPITAL | Age: 40
End: 2025-04-15
Attending: STUDENT IN AN ORGANIZED HEALTH CARE EDUCATION/TRAINING PROGRAM
Payer: COMMERCIAL

## 2025-04-15 ENCOUNTER — OFFICE VISIT (OUTPATIENT)
Dept: UROLOGY | Facility: HOSPITAL | Age: 40
End: 2025-04-15
Attending: OBSTETRICS & GYNECOLOGY
Payer: COMMERCIAL

## 2025-04-15 VITALS — WEIGHT: 174 LBS | HEIGHT: 68.5 IN | BODY MASS INDEX: 26.07 KG/M2 | RESPIRATION RATE: 18 BRPM

## 2025-04-15 DIAGNOSIS — N94.10 DYSPAREUNIA IN FEMALE: ICD-10-CM

## 2025-04-15 DIAGNOSIS — Z87.448 HISTORY OF PYELONEPHRITIS: ICD-10-CM

## 2025-04-15 DIAGNOSIS — L68.0 HIRSUTISM: ICD-10-CM

## 2025-04-15 DIAGNOSIS — M62.89 PELVIC FLOOR TENSION: Primary | ICD-10-CM

## 2025-04-15 DIAGNOSIS — G89.29 CHRONIC BILATERAL LOW BACK PAIN, UNSPECIFIED WHETHER SCIATICA PRESENT: ICD-10-CM

## 2025-04-15 DIAGNOSIS — M54.50 CHRONIC BILATERAL LOW BACK PAIN, UNSPECIFIED WHETHER SCIATICA PRESENT: ICD-10-CM

## 2025-04-15 DIAGNOSIS — K59.02 CONSTIPATION DUE TO OUTLET DYSFUNCTION: ICD-10-CM

## 2025-04-15 DIAGNOSIS — N39.41 URGE INCONTINENCE: ICD-10-CM

## 2025-04-15 LAB
BLOOD URINE: NEGATIVE
CONTROL RUN WITHIN 24 HOURS?: YES
LEUKOCYTE ESTERASE URINE: NEGATIVE
NITRITE URINE: NEGATIVE

## 2025-04-15 PROCEDURE — 87086 URINE CULTURE/COLONY COUNT: CPT | Performed by: OBSTETRICS & GYNECOLOGY

## 2025-04-15 PROCEDURE — 81002 URINALYSIS NONAUTO W/O SCOPE: CPT | Performed by: OBSTETRICS & GYNECOLOGY

## 2025-04-15 PROCEDURE — 99212 OFFICE O/P EST SF 10 MIN: CPT

## 2025-04-15 PROCEDURE — 84410 TESTOSTERONE BIOAVAILABLE: CPT

## 2025-04-15 PROCEDURE — 36415 COLL VENOUS BLD VENIPUNCTURE: CPT

## 2025-04-15 RX ORDER — HYDROCHLOROTHIAZIDE 12.5 MG/1
12.5 CAPSULE ORAL DAILY
COMMUNITY

## 2025-04-15 RX ORDER — LOSARTAN POTASSIUM 50 MG/1
TABLET ORAL DAILY
COMMUNITY

## 2025-04-21 LAB
SEX HORM BIND GLOB: 21.3 NMOL/L
TESTOST % FREE+WEAK BND: 24.2 %
TESTOST FREE+WEAK BND: 2.7 NG/DL
TESTOSTERONE TOT /MS: 11.2 NG/DL

## 2025-05-01 ENCOUNTER — OFFICE VISIT (OUTPATIENT)
Dept: FAMILY MEDICINE CLINIC | Facility: CLINIC | Age: 40
End: 2025-05-01
Payer: COMMERCIAL

## 2025-05-01 VITALS
BODY MASS INDEX: 25.76 KG/M2 | HEIGHT: 68 IN | RESPIRATION RATE: 18 BRPM | DIASTOLIC BLOOD PRESSURE: 75 MMHG | SYSTOLIC BLOOD PRESSURE: 109 MMHG | TEMPERATURE: 98 F | OXYGEN SATURATION: 97 % | WEIGHT: 170 LBS | HEART RATE: 102 BPM

## 2025-05-01 DIAGNOSIS — E11.65 TYPE 2 DIABETES MELLITUS WITH HYPERGLYCEMIA, WITHOUT LONG-TERM CURRENT USE OF INSULIN (HCC): Primary | ICD-10-CM

## 2025-05-01 PROCEDURE — 3008F BODY MASS INDEX DOCD: CPT | Performed by: STUDENT IN AN ORGANIZED HEALTH CARE EDUCATION/TRAINING PROGRAM

## 2025-05-01 PROCEDURE — 3074F SYST BP LT 130 MM HG: CPT | Performed by: STUDENT IN AN ORGANIZED HEALTH CARE EDUCATION/TRAINING PROGRAM

## 2025-05-01 PROCEDURE — 99214 OFFICE O/P EST MOD 30 MIN: CPT | Performed by: STUDENT IN AN ORGANIZED HEALTH CARE EDUCATION/TRAINING PROGRAM

## 2025-05-01 PROCEDURE — 3051F HG A1C>EQUAL 7.0%<8.0%: CPT | Performed by: STUDENT IN AN ORGANIZED HEALTH CARE EDUCATION/TRAINING PROGRAM

## 2025-05-01 PROCEDURE — 3061F NEG MICROALBUMINURIA REV: CPT | Performed by: STUDENT IN AN ORGANIZED HEALTH CARE EDUCATION/TRAINING PROGRAM

## 2025-05-01 PROCEDURE — 3078F DIAST BP <80 MM HG: CPT | Performed by: STUDENT IN AN ORGANIZED HEALTH CARE EDUCATION/TRAINING PROGRAM

## 2025-05-01 RX ORDER — TIRZEPATIDE 2.5 MG/.5ML
2.5 INJECTION, SOLUTION SUBCUTANEOUS WEEKLY
Qty: 2 ML | Refills: 1 | Status: SHIPPED | OUTPATIENT
Start: 2025-05-01

## 2025-05-01 RX ORDER — BLOOD-GLUCOSE METER
1 EACH MISCELLANEOUS DAILY
COMMUNITY
Start: 2025-01-28

## 2025-05-01 NOTE — PROGRESS NOTES
HPI:    Patient ID: Patricia Gauthier is a 39 year old female.    HPI  Pt presenting for follow-up.    H/o SB polyps  Monitoring per GI    H/o chronic low back pain  Recently seen by UroGyne  Needs to start PFPT  MSK relaxer with nausea    Lorazepam for insomnia PRN    H/o T2DM  Reports increased nausea with Ozempic dosing    Review of Systems   A comprehensive 10 point review of systems was completed.  Pertinent positives and negatives noted in the the HPI.     Current Medications[1]  Allergies:Allergies[2]   Vitals:    05/01/25 1659   BP: 109/75   Pulse: 102   Resp: 18   Temp: 98.4 °F (36.9 °C)   TempSrc: Temporal   SpO2: 97%   Weight: 170 lb (77.1 kg)   Height: 5' 8\" (1.727 m)       Body mass index is 25.85 kg/m².   PHYSICAL EXAM:   Physical Exam  Vitals reviewed.   Constitutional:       General: She is not in acute distress.     Appearance: Normal appearance. She is well-developed.   HENT:      Head: Normocephalic and atraumatic.      Right Ear: External ear normal.      Left Ear: External ear normal.   Eyes:      Conjunctiva/sclera: Conjunctivae normal.   Neck:      Thyroid: No thyroid mass or thyroid tenderness.   Cardiovascular:      Rate and Rhythm: Normal rate and regular rhythm.      Pulses: Normal pulses.      Heart sounds: Normal heart sounds, S1 normal and S2 normal. No murmur heard.  Pulmonary:      Effort: Pulmonary effort is normal. No respiratory distress.      Breath sounds: Normal breath sounds. No wheezing, rhonchi or rales.   Abdominal:      General: Bowel sounds are normal.      Palpations: Abdomen is soft.      Tenderness: There is no abdominal tenderness. There is no guarding or rebound.   Musculoskeletal:      Cervical back: Normal range of motion and neck supple. No muscular tenderness.      Right lower leg: No edema.      Left lower leg: No edema.   Feet:      Comments: Bilateral barefoot skin diabetic exam is normal, visualized feet and the appearance is normal.  Bilateral  monofilament/sensation of both feet is normal.  Pulsation pedal pulse exam of both lower legs/feet is normal as well.  Lymphadenopathy:      Cervical: No cervical adenopathy.   Skin:     General: Skin is warm and dry.      Coloration: Skin is not jaundiced.   Neurological:      General: No focal deficit present.      Mental Status: She is alert and oriented to person, place, and time. Mental status is at baseline.   Psychiatric:         Attention and Perception: Attention normal.         Mood and Affect: Mood normal.         Behavior: Behavior normal. Behavior is cooperative.         Cognition and Memory: Cognition normal.             ASSESSMENT/PLAN:   1. Type 2 diabetes mellitus with hyperglycemia, without long-term current use of insulin ([facility])  Last A1c 7.0 Jan 2025  Started on Ozempic March 2025 -- notes persistent nausea despite low-dose dosing  Will transition to Mounjaro and monitor clinical response  Encouraged Ophanjelica lo  - discussed red flags for urgent reevaluation  - to call with any questions/concerns  - Tirzepatide (MOUNJARO) 2.5 MG/0.5ML Subcutaneous Solution Auto-injector; Inject 2.5 mg into the skin once a week.  Dispense: 2 mL; Refill: 1  - Ophthalmology Referral - In Network    Pt verbalized understanding and agrees with plan.    No orders of the defined types were placed in this encounter.      Meds This Visit:  Requested Prescriptions     Signed Prescriptions Disp Refills    Tirzepatide (MOUNJARO) 2.5 MG/0.5ML Subcutaneous Solution Auto-injector 2 mL 1     Sig: Inject 2.5 mg into the skin once a week.       Imaging & Referrals:  OPHTHALMOLOGY - INTERNAL         ID#[id number]         [1]   Current Outpatient Medications   Medication Sig Dispense Refill    Blood Glucose Monitoring Suppl (ACCU-CHEK GUIDE) w/Device Does not apply Kit 1 strip by In Vitro route daily.      Tirzepatide (MOUNJARO) 2.5 MG/0.5ML Subcutaneous Solution Auto-injector Inject 2.5 mg into the skin once a week. 2 mL 1    losartan 50  MG Oral Tab Take by mouth daily.      hydroCHLOROthiazide 12.5 MG Oral Cap Take 1 capsule (12.5 mg total) by mouth daily.      semaglutide (OZEMPIC, 0.25 OR 0.5 MG/DOSE,) 2 MG/3ML Subcutaneous Solution Pen-injector Inject 0.25 mg into the skin once a week. 1 each 12    LORazepam 0.5 MG Oral Tab Take 1 tablet (0.5 mg total) by mouth nightly as needed (sleep). 30 tablet 1    dapagliflozin (FARXIGA) 5 MG Oral Tab Take 1 tablet (5 mg total) by mouth daily. 90 tablet 3    Lancets 33G Does not apply Misc 1 each daily. 100 each 2    TRUEplus Lancets 33G Does not apply Misc 1 Lancet by Finger stick route daily. 100 each 3    Glucose Blood (GLUCOSE METER TEST) In Vitro Strip 1 each by Finger stick route daily. 90 strip 0    fluticasone-salmeterol (ADVAIR HFA) 230-21 MCG/ACT Inhalation Aerosol Inhale 1 puff into the lungs 2 (two) times daily. 12 g 1    albuterol 108 (90 Base) MCG/ACT Inhalation Aero Soln Inhale 2 puffs into the lungs as needed in the morning and 2 puffs as needed at noon and 2 puffs as needed in the evening and 2 puffs as needed before bedtime for Shortness of Breath.      methocarbamol 750 MG Oral Tab Take 1 tablet (750 mg total) by mouth 3 (three) times daily as needed. (Patient not taking: Reported on 5/1/2025) 60 tablet 0   [2] No Known Allergies

## 2025-05-02 ENCOUNTER — TELEPHONE (OUTPATIENT)
Dept: FAMILY MEDICINE CLINIC | Facility: CLINIC | Age: 40
End: 2025-05-02

## 2025-05-03 NOTE — TELEPHONE ENCOUNTER
Prior auth required for Mounjaro, chart notes open.  Please sign and route back to triage support.

## 2025-05-07 ENCOUNTER — TELEMEDICINE (OUTPATIENT)
Dept: TELEHEALTH | Age: 40
End: 2025-05-07
Payer: COMMERCIAL

## 2025-05-07 ENCOUNTER — OFFICE VISIT (OUTPATIENT)
Dept: FAMILY MEDICINE CLINIC | Facility: CLINIC | Age: 40
End: 2025-05-07
Payer: COMMERCIAL

## 2025-05-07 VITALS
WEIGHT: 170 LBS | SYSTOLIC BLOOD PRESSURE: 120 MMHG | DIASTOLIC BLOOD PRESSURE: 78 MMHG | TEMPERATURE: 98 F | OXYGEN SATURATION: 99 % | RESPIRATION RATE: 18 BRPM | HEART RATE: 94 BPM | BODY MASS INDEX: 25.76 KG/M2 | HEIGHT: 68 IN

## 2025-05-07 DIAGNOSIS — R09.81 NASAL CONGESTION: ICD-10-CM

## 2025-05-07 DIAGNOSIS — R51.9 LEFT FACIAL PRESSURE AND PAIN: ICD-10-CM

## 2025-05-07 DIAGNOSIS — H92.02 OTALGIA, LEFT: Primary | ICD-10-CM

## 2025-05-07 DIAGNOSIS — J02.9 SORE THROAT: ICD-10-CM

## 2025-05-07 DIAGNOSIS — H93.8X2 EAR PRESSURE, LEFT: Primary | ICD-10-CM

## 2025-05-07 PROCEDURE — 3008F BODY MASS INDEX DOCD: CPT | Performed by: NURSE PRACTITIONER

## 2025-05-07 PROCEDURE — 3078F DIAST BP <80 MM HG: CPT | Performed by: NURSE PRACTITIONER

## 2025-05-07 PROCEDURE — 3074F SYST BP LT 130 MM HG: CPT | Performed by: NURSE PRACTITIONER

## 2025-05-07 PROCEDURE — 99213 OFFICE O/P EST LOW 20 MIN: CPT | Performed by: NURSE PRACTITIONER

## 2025-05-07 NOTE — PATIENT INSTRUCTIONS
Tylenol and Motrin as needed  Rest, push fluids  START daily antihistamine (Zyrtec, Claritin, Allegra) and choose one of those. Take daily for 1-2 weeks to help with any post nasal drainage/sore throat  START Flonase nasal spray 1-2 time daily  Return to care for new/worsening symptoms

## 2025-05-07 NOTE — PROGRESS NOTES
Subjective:   Patient ID: Patricia Gauthier is a 39 year old female.    Patient is a 39 year old female who presents today with complaints of left sided facial pressure,headaches, left ear pressure and \"feeling like she's underwater\" or \"on an airplane\" x 4 days. Yesterday had some nasal congestion and last night developed a slight sore throat. Also feels like the lymph nodes on the left side of her neck are swollen and tender. Denies fever, body aches, chills, runny nose, rashes, n/v/d, abdominal pain, cough, SOB or wheezing. Did have a dental implant recently on the right upper side. She saw her dentist today for her left sided symptoms and notes exam was normal. Tolerating PO well at home. Attempted treatment prior to arrival = Tylenol and Motrin with temporary relief. No ill contacts she can identify.        History/Other:   Review of Systems   Constitutional:  Negative for appetite change, chills and fever.   HENT:  Positive for congestion, ear pain (+ pressure), sinus pressure and sore throat. Negative for rhinorrhea.    Respiratory:  Negative for cough, shortness of breath and wheezing.    Gastrointestinal:  Negative for abdominal pain, diarrhea, nausea and vomiting.   Musculoskeletal:  Negative for myalgias.   Skin:  Negative for rash.   Neurological:  Positive for headaches.     Current Medications[1]  Allergies:Allergies[2]    /78   Pulse 94   Temp 97.7 °F (36.5 °C)   Resp 18   Ht 5' 8\" (1.727 m)   Wt 170 lb (77.1 kg)   LMP 11/05/2017 (Approximate)   SpO2 99%   BMI 25.85 kg/m²       Objective:   Physical Exam  Vitals reviewed.   Constitutional:       General: She is awake. She is not in acute distress.     Appearance: Normal appearance. She is well-developed and well-groomed. She is not ill-appearing, toxic-appearing or diaphoretic.   HENT:      Head: Normocephalic and atraumatic.      Right Ear: Ear canal and external ear normal. A middle ear effusion is present.      Left Ear: Ear canal and  external ear normal. A middle ear effusion is present.      Nose: Nose normal.      Mouth/Throat:      Lips: Pink.      Mouth: Mucous membranes are moist. No oral lesions.      Pharynx: Oropharynx is clear. Uvula midline. Postnasal drip present.      Tonsils: 0 on the right. 0 on the left.   Cardiovascular:      Rate and Rhythm: Normal rate and regular rhythm.   Pulmonary:      Effort: Pulmonary effort is normal. No respiratory distress.      Breath sounds: Normal breath sounds and air entry. No decreased breath sounds, wheezing, rhonchi or rales.   Lymphadenopathy:      Head:      Right side of head: No tonsillar adenopathy.      Left side of head: No tonsillar adenopathy.      Cervical: No cervical adenopathy.   Skin:     General: Skin is warm and dry.   Neurological:      Mental Status: She is alert and oriented to person, place, and time.   Psychiatric:         Behavior: Behavior is cooperative.         Assessment & Plan:   1. Ear pressure, left    2. Nasal congestion    3. Sore throat        No orders of the defined types were placed in this encounter.      Meds This Visit:  Requested Prescriptions      No prescriptions requested or ordered in this encounter     Reassuring physical exam findings. Vitals WNL. No sign of bacterial etiology, RDS or dehydration at this time.  Supportive care and return to care measures reviewed.  Patient v/u and is comfortable with this plan.    Patient Instructions   Tylenol and Motrin as needed  Rest, push fluids  START daily antihistamine (Zyrtec, Claritin, Allegra) and choose one of those. Take daily for 1-2 weeks to help with any post nasal drainage/sore throat  START Flonase nasal spray 1-2 time daily  Return to care for new/worsening symptoms           [1]   Current Outpatient Medications   Medication Sig Dispense Refill    Blood Glucose Monitoring Suppl (ACCU-CHEK GUIDE) w/Device Does not apply Kit 1 strip by In Vitro route daily.      Tirzepatide (MOUNJARO) 2.5 MG/0.5ML  Subcutaneous Solution Auto-injector Inject 2.5 mg into the skin once a week. 2 mL 1    losartan 50 MG Oral Tab Take by mouth daily.      hydroCHLOROthiazide 12.5 MG Oral Cap Take 1 capsule (12.5 mg total) by mouth daily.      methocarbamol 750 MG Oral Tab Take 1 tablet (750 mg total) by mouth 3 (three) times daily as needed. (Patient not taking: Reported on 5/1/2025) 60 tablet 0    semaglutide (OZEMPIC, 0.25 OR 0.5 MG/DOSE,) 2 MG/3ML Subcutaneous Solution Pen-injector Inject 0.25 mg into the skin once a week. 1 each 12    LORazepam 0.5 MG Oral Tab Take 1 tablet (0.5 mg total) by mouth nightly as needed (sleep). 30 tablet 1    dapagliflozin (FARXIGA) 5 MG Oral Tab Take 1 tablet (5 mg total) by mouth daily. 90 tablet 3    Lancets 33G Does not apply Misc 1 each daily. 100 each 2    TRUEplus Lancets 33G Does not apply Misc 1 Lancet by Finger stick route daily. 100 each 3    Glucose Blood (GLUCOSE METER TEST) In Vitro Strip 1 each by Finger stick route daily. 90 strip 0    fluticasone-salmeterol (ADVAIR HFA) 230-21 MCG/ACT Inhalation Aerosol Inhale 1 puff into the lungs 2 (two) times daily. 12 g 1    albuterol 108 (90 Base) MCG/ACT Inhalation Aero Soln Inhale 2 puffs into the lungs as needed in the morning and 2 puffs as needed at noon and 2 puffs as needed in the evening and 2 puffs as needed before bedtime for Shortness of Breath.     [2] No Known Allergies

## 2025-05-07 NOTE — PROGRESS NOTES
Virtual/Telephone Check-In    Patricia Gauthier verbally consents to a Virtual/Telephone Check-In service on 05/07/25.  Patient has been referred to the [facility] website at [email or web address] to review the yearly Consent to Treat document.  Patient understands and accepts financial responsibility for any deductible, co-insurance and/or co-pays associated with this service.       Telehealth Verbal Consent   I conducted a telehealth visit with Patricia Gauthier today, 05/07/25, which was completed using two-way, real-time interactive audio and video communication. This has been done in good chuck to provide continuity of care in the best interest of the provider-patient relationship, due to the COVID -19 public health crisis/national emergency where restrictions of face-to-face office visits are ongoing. Every conscious effort was taken to allow for sufficient and adequate time to complete the visit.  The patient was made aware of the limitations of the telehealth visit, including treatment limitations as no physical exam could be performed.  The patient was advised to call 911 or to go to the ER in case there was an emergency.  The patient was also advised of the potential privacy & security concerns related to the telehealth platform.   The patient was made aware of where to find [facility]'s notice of privacy practices, telehealth consent form and other related consent forms and documents.  which are located on the [facility] website. The patient verbally agreed to telehealth consent form, related consents and the risks discussed.    Lastly, the patient confirmed that they were in [place].   Included in this visit, time may have been spent reviewing labs, medications, radiology tests and decision making. Appropriate medical decision-making and tests are ordered as detailed in the plan of care above.  Coding/billing information is submitted for this visit based on complexity of care and/or time spent for the visit.    CHIEF  COMPLAINT:  Chief Complaint   Patient presents with    Ear Pain       HPI:  Patricia Gauthier is a 39 year old female who presents for a video visit.  Patient reports L sided ear pain and \"intense\" facial pain/pressure for the past 4 days. Reports the pressure and pain can be 10/10 at times. Went to dentist today who ruled out dental issue. Pt reports \"feels like I am underwater and hard to hear.\" Has some nasal congestion but not blowing out any thick purulent nasal mucous. Also reports ringing in L ear.  Patient denies fever, chills, cough, ST, post nasal drip.  Patient has tried Ibuprofen and Tylenol for symptoms, which has seemed to help temporarily.     Current Medications[1]  Past Medical History[2]  Past Surgical History[3]    Short Social Hx on File[4]     REVIEW OF SYSTEMS:  GENERAL: ok appetite  SKIN: no rashes or abnormal skin lesions  HEENT: See HPI    EXAM:  General: Alert, Well-appearing, and In no acute distress  Respiratory:   Speaking in full sentences comfortably  Normal work of breathing  No cough during visit  Head: Normocephalic  Nose: No obvious nasal discharge.  Skin: No obvious rashes or lesions from what observed. No notable facial swelling    No results found for this or any previous visit (from the past 24 hours).    ASSESSMENT AND PLAN:  Patricia Gauthier is a 39 year old female who presents with symptoms that are consistent with    ASSESSMENT:   Encounter Diagnoses   Name Primary?    Otalgia, left Yes    Left facial pressure and pain        PLAN: Discussed limitations of telehealth. Will need in person eval to fully examine ear/face and r/o AOM, salivary gland infection, sinusitis. Pt agreeable, discussed ICs and WICs.     Patricia Gauthier understands video visit evaluation is not a substitute for face-to-face examination or emergency care. Patient advised to go to ER or call 911 for worsening symptoms or acute distress.           [1]   Current Outpatient Medications   Medication Sig Dispense  Refill    Blood Glucose Monitoring Suppl (ACCU-CHEK GUIDE) w/Device Does not apply Kit 1 strip by In Vitro route daily.      Tirzepatide (MOUNJARO) 2.5 MG/0.5ML Subcutaneous Solution Auto-injector Inject 2.5 mg into the skin once a week. 2 mL 1    losartan 50 MG Oral Tab Take by mouth daily.      hydroCHLOROthiazide 12.5 MG Oral Cap Take 1 capsule (12.5 mg total) by mouth daily.      methocarbamol 750 MG Oral Tab Take 1 tablet (750 mg total) by mouth 3 (three) times daily as needed. (Patient not taking: Reported on 5/1/2025) 60 tablet 0    semaglutide (OZEMPIC, 0.25 OR 0.5 MG/DOSE,) 2 MG/3ML Subcutaneous Solution Pen-injector Inject 0.25 mg into the skin once a week. 1 each 12    LORazepam 0.5 MG Oral Tab Take 1 tablet (0.5 mg total) by mouth nightly as needed (sleep). 30 tablet 1    dapagliflozin (FARXIGA) 5 MG Oral Tab Take 1 tablet (5 mg total) by mouth daily. 90 tablet 3    Lancets 33G Does not apply Misc 1 each daily. 100 each 2    TRUEplus Lancets 33G Does not apply Misc 1 Lancet by Finger stick route daily. 100 each 3    Glucose Blood (GLUCOSE METER TEST) In Vitro Strip 1 each by Finger stick route daily. 90 strip 0    fluticasone-salmeterol (ADVAIR HFA) 230-21 MCG/ACT Inhalation Aerosol Inhale 1 puff into the lungs 2 (two) times daily. 12 g 1    albuterol 108 (90 Base) MCG/ACT Inhalation Aero Soln Inhale 2 puffs into the lungs as needed in the morning and 2 puffs as needed at noon and 2 puffs as needed in the evening and 2 puffs as needed before bedtime for Shortness of Breath.     [2]   Past Medical History:   Anemia    Asthma (HCC)    Bowel obstruction (HCC)    Chronic hepatitis (HCC)    COVID    9/2021    Diabetes mellitus (HCC)    Elevated LFTs    History of blood transfusion    EMH- due to anemia- no reaction    JUAQUIN (iron deficiency anemia)    Knee swelling    Osteoarthritis    Peutz-Jeghers polyps of small bowel (HCC)    based on workup, suspected diagnosis. Treatment ongoing at [facility]-Dr. Nicholas     Pulmonary embolism (HCC)    Pyelonephritis    bilateral    Small bowel polyp    MULTIPLE noted in small bowel on VCE> referred for enteroscopy    Thalassemia alpha carrier   [3]   Past Surgical History:  Procedure Laterality Date    Anesth, section  2009    x1    Bowel resection  2020    Bowel resection with polyp removal    Colectomy   and     Colonoscopy      with polyp removal    Hysterectomy  2017    partial    Knee surgery Left     hardware removed from left knee    Needle biopsy right  10/19/2020    MRI bx right breast     Other surgical history      Bowel obstruction-at birth    Other surgical history      Colon resection for bowel obstruction-one month after colon mass removed.    Tonsillectomy      Total abdominal hysterectomy N/A 2018    Procedure: ABDOMINAL HYSTERECTOMY;  Surgeon: Earnest Carrera MD;  Location: [facility] MAIN OR   [4]   Social History  Socioeconomic History    Marital status: Single   Tobacco Use    Smoking status: Never     Passive exposure: Never    Smokeless tobacco: Never   Vaping Use    Vaping status: Never Used   Substance and Sexual Activity    Alcohol use: Yes     Comment: rarely    Drug use: No    Sexual activity: Yes     Partners: Male   Other Topics Concern    Caffeine Concern Yes     Comment: Tea, soda     Social Drivers of Health     Food Insecurity: Low Risk  (2024)    Received from [facility]    Food Insecurity     Have there been times that your food ran out, and you didn't have money to get more?: No     Are there times that you worry that this might happen?: No   Transportation Needs: Low Risk  (2024)    Received from [facility]    Transportation Needs     Do you have trouble getting transportation to medical appointments?: No   Housing Stability: Low Risk  (2024)    Received from [facility]    Housing Stability     Are you worried that your electric,  gas, oil, or water might be shut off?: No     Are you concerned about having a safe and reliable place to live?: No

## 2025-05-15 DIAGNOSIS — E11.65 TYPE 2 DIABETES MELLITUS WITH HYPERGLYCEMIA, WITHOUT LONG-TERM CURRENT USE OF INSULIN (HCC): ICD-10-CM

## 2025-05-15 RX ORDER — TIRZEPATIDE 2.5 MG/.5ML
2.5 INJECTION, SOLUTION SUBCUTANEOUS WEEKLY
Qty: 2 ML | Refills: 1 | Status: CANCELLED | OUTPATIENT
Start: 2025-05-15

## 2025-05-26 NOTE — PROGRESS NOTES
ID: Patricia Gauthier  : 1985  Date: 25    This is a virtual/phone visit being conducted with patient's consent.  History collected via telephone.  MD interview conduction via telephone  The patient has provided verbal understanding of the co-insurance liability for the telephonic/video provider services.  Length of services provided: 10 minutes.       Chief Complaint   Patient presents with    Other     Follow up meds       HPI:  39 year old female, G7, Vaginal deliveries x3, c-setion x 1 who was originally seen on 4/15/25. She presented for evaluation of vaginal pain and dyspareunia, which is longstanding.   Has history of chronic constipation and extensive GI issues including dyspepsia, IBS-C with pain and bloating.  Follows up with GI and [facility] (Dr. Caryn Wadsworth MD). Had anal manometry on 2025 with dx of dyssynergic defecation.  Referred to pelvic floor PT but has not scheduled. Has tried fiber and Miralax with no relief. Was prescribed Ibsrela  but awaiting insurance approval.   Status post hysterectomy in 2019 for abnormal bleeding.  H/o bowel surgery as an infant. Had SBO s/p small bowel resection on 2020 at [facility].  Has history of recurrent UTIs with admissions for Pyelo and Urosepsis on 2024 (+ Klebsiella). Had pyelo again on 2025 (negative culture).   Baseline LUTS include voiding every 1-2 hours during the day and x 3 at night.   Has both UUI and DIANA  Has ongoing back pain, doing therapy for her back.     PMHx: DM (A1C 7.0),  Chronic back pain (status post back surgery on 24), h/o PE, HTN, asthma.     Initial urogyn exam demonstrated +UGA, +++vaginal tenderness, s/p hysterectomy, no prolapse, PVR 5 ml. Urine culture negative. Recommended to schedule pelvic floor PT as previously recommended. Also given trial of Gemtesa 75 mg PO daily and Cephalexin 250 mg PO x 3 per week. Pain Consult placed. This is her follow up encounter.    Interval history:  The patient did not get rx for Cephalexin  so didn't take.  No interval UTIs, but she doesn't always have symptoms.  Only symptom is \"back pain\" which she has all the time.    Has not started pelvic floor PT. First appointment is on 7/3.  Did not take Gemtesa because she was told \"it would interfere with diabetes medication\".   No change in symptoms.         Review of Systems:    A comprehensive 12 point review of systems was completed.  Pertinent positives noted in the the HPI.  Denies CP  Denies SOB    Vitals:  Unable to perform vitals or do physical as this is a virtual visit.  The patient sounds alert and oriented x 3, no acute distress. The patient is speaking in complete sentences without any conversational dyspnea or respiratory distress.    Impression:    ICD-10-CM    1. Constipation due to outlet dysfunction  K59.02       2. Pelvic floor tension  M62.89       3. Dyspareunia in female  N94.10       4. Urge incontinence  N39.41       5. History of pyelonephritis  Z87.448             Plan:  -Keep appointment to start pelvic floor PT/biofeedback.   -Continue IBS-c management per GI.  -Agrees to trial of Gemtesa 75 mg PO daily for OAB symptoms as previously discussed.   -Also recommend low dose antibiotic suppression with Cephalexin 250 mg x 3 per week. Rx sent to pharmacy.   -Follow up in 3 months via televisit.     Lou Aquino MD, FACOG, FACS  Urogynecology and Reconstructive Pelvic Surgery

## 2025-05-27 ENCOUNTER — VIRTUAL PHONE E/M (OUTPATIENT)
Dept: UROLOGY | Facility: HOSPITAL | Age: 40
End: 2025-05-27
Attending: OBSTETRICS & GYNECOLOGY
Payer: COMMERCIAL

## 2025-05-27 DIAGNOSIS — N39.41 URGE INCONTINENCE: ICD-10-CM

## 2025-05-27 DIAGNOSIS — M62.89 PELVIC FLOOR TENSION: ICD-10-CM

## 2025-05-27 DIAGNOSIS — N94.10 DYSPAREUNIA IN FEMALE: ICD-10-CM

## 2025-05-27 DIAGNOSIS — E11.65 TYPE 2 DIABETES MELLITUS WITH HYPERGLYCEMIA, WITHOUT LONG-TERM CURRENT USE OF INSULIN (HCC): ICD-10-CM

## 2025-05-27 DIAGNOSIS — Z87.448 HISTORY OF PYELONEPHRITIS: ICD-10-CM

## 2025-05-27 DIAGNOSIS — K59.02 CONSTIPATION DUE TO OUTLET DYSFUNCTION: Primary | ICD-10-CM

## 2025-05-27 RX ORDER — TIRZEPATIDE 2.5 MG/.5ML
2.5 INJECTION, SOLUTION SUBCUTANEOUS WEEKLY
Qty: 2 ML | Refills: 1 | Status: CANCELLED | OUTPATIENT
Start: 2025-05-27

## 2025-05-29 NOTE — TELEPHONE ENCOUNTER
Triage support please advise on patient message   05/15/2025 encounter states patient is unable to fill until 05/17/2025  Per patient message   \"Patient Comment: I’ve been going back and forth between the pharmacy, insurance and asking for authorization here. After speaking with the pharmacy and insurance company, they informed me that the authorization was submitted by the doctor on May 8th and was later pulled back by the doctor. \"    Called [place] [facility]   Confirmed patient first name and last name and date of birth with pharmacy staff member   Informed pharmacy staff member calling on recorded line   Confirmed with pharmacy staff member that they are waiting on a prior authorization    Routing to triage support to assist with prior authorization

## 2025-06-03 ENCOUNTER — TELEPHONE (OUTPATIENT)
Dept: FAMILY MEDICINE CLINIC | Facility: CLINIC | Age: 40
End: 2025-06-03

## 2025-06-03 NOTE — TELEPHONE ENCOUNTER
Information regarding your request  Duplicate Pending PA exists. A pending PA request for the patient/medication combination exists. Please contact [employer] at [phone] with questions.    Per insurance a prior auth started on 5/29/25  This is a duplicate, in process at this time.

## 2025-06-03 NOTE — TELEPHONE ENCOUNTER
No pharmacy benefits on file.   The eligibility search did not return any results. The patient might not have insurance or might have insurance that doesn't send pharmacy benefits.

## 2025-06-03 NOTE — TELEPHONE ENCOUNTER
Tanna Mahan, RN Registered Nurse Signed 5/30/2025     Copy     TRIAGE SUPPORT =please assist, prefers Mounjaro.   Per medication record ,Mounjaro was prescribed on 5/1/25.

## 2025-06-23 ENCOUNTER — TELEPHONE (OUTPATIENT)
Dept: FAMILY MEDICINE CLINIC | Facility: CLINIC | Age: 40
End: 2025-06-23

## 2025-07-14 ENCOUNTER — TELEPHONE (OUTPATIENT)
Dept: FAMILY MEDICINE CLINIC | Facility: CLINIC | Age: 40
End: 2025-07-14

## 2025-07-14 NOTE — TELEPHONE ENCOUNTER
Medications - Current[1]    Rx: Mounjaro 2.5mg/0.5ml auto injectors    Key:  [id number]       [1]   Current Outpatient Medications:     cephalexin 250 MG Oral Cap, Take 1 capsule (250 mg total) by mouth 3 (three) times a week., Disp: 30 capsule, Rfl: 1    Blood Glucose Monitoring Suppl (ACCU-CHEK GUIDE) w/Device Does not apply Kit, 1 strip by In Vitro route daily., Disp: , Rfl:     Tirzepatide (MOUNJARO) 2.5 MG/0.5ML Subcutaneous Solution Auto-injector, Inject 2.5 mg into the skin once a week., Disp: 2 mL, Rfl: 1    losartan 50 MG Oral Tab, Take by mouth daily., Disp: , Rfl:     hydroCHLOROthiazide 12.5 MG Oral Cap, Take 1 capsule (12.5 mg total) by mouth daily., Disp: , Rfl:     methocarbamol 750 MG Oral Tab, Take 1 tablet (750 mg total) by mouth 3 (three) times daily as needed. (Patient not taking: Reported on 5/1/2025), Disp: 60 tablet, Rfl: 0    semaglutide (OZEMPIC, 0.25 OR 0.5 MG/DOSE,) 2 MG/3ML Subcutaneous Solution Pen-injector, Inject 0.25 mg into the skin once a week., Disp: 1 each, Rfl: 12    LORazepam 0.5 MG Oral Tab, Take 1 tablet (0.5 mg total) by mouth nightly as needed (sleep)., Disp: 30 tablet, Rfl: 1    dapagliflozin (FARXIGA) 5 MG Oral Tab, Take 1 tablet (5 mg total) by mouth daily., Disp: 90 tablet, Rfl: 3    Lancets 33G Does not apply Misc, 1 each daily., Disp: 100 each, Rfl: 2    TRUEplus Lancets 33G Does not apply Misc, 1 Lancet by Finger stick route daily., Disp: 100 each, Rfl: 3    Glucose Blood (GLUCOSE METER TEST) In Vitro Strip, 1 each by Finger stick route daily., Disp: 90 strip, Rfl: 0    fluticasone-salmeterol (ADVAIR HFA) 230-21 MCG/ACT Inhalation Aerosol, Inhale 1 puff into the lungs 2 (two) times daily., Disp: 12 g, Rfl: 1    albuterol 108 (90 Base) MCG/ACT Inhalation Aero Soln, Inhale 2 puffs into the lungs as needed in the morning and 2 puffs as needed at noon and 2 puffs as needed in the evening and 2 puffs as needed before bedtime for Shortness of Breath., Disp: , Rfl:

## 2025-07-28 ENCOUNTER — TELEPHONE (OUTPATIENT)
Dept: UROLOGY | Facility: HOSPITAL | Age: 40
End: 2025-07-28

## 2025-07-28 DIAGNOSIS — G47.00 INSOMNIA, UNSPECIFIED TYPE: ICD-10-CM

## 2025-07-28 DIAGNOSIS — N39.41 URGE INCONTINENCE: Primary | ICD-10-CM

## 2025-07-28 RX ORDER — VIBEGRON 75 MG/1
1 TABLET, FILM COATED ORAL DAILY
Qty: 30 TABLET | Refills: 11 | Status: SHIPPED | OUTPATIENT
Start: 2025-07-28 | End: 2025-08-27

## 2025-07-28 NOTE — TELEPHONE ENCOUNTER
Incoming call from [facility] requesting refill on Gemtesa.  Pt was on a trial of Gemtesa and is noting 100% improvement in UUI and urgency.   Pt has follow up scheduled with Dr Aquino on 8/26/25 @9 am .  [facility]  Dr Aquino Gemtesa 75 mg daily.  Rx sent to her preferred pharmacy.

## 2025-07-30 RX ORDER — LORAZEPAM 0.5 MG/1
0.5 TABLET ORAL NIGHTLY PRN
Qty: 30 TABLET | Refills: 1 | Status: SHIPPED | OUTPATIENT
Start: 2025-07-30

## (undated) DIAGNOSIS — Q43.8 TORTUOUS COLON: ICD-10-CM

## (undated) DIAGNOSIS — K63.89 JEJUNAL POLYP: ICD-10-CM

## (undated) DIAGNOSIS — K59.00 CONSTIPATION, UNSPECIFIED CONSTIPATION TYPE: Primary | ICD-10-CM

## (undated) DIAGNOSIS — K64.8 INTERNAL HEMORRHOIDS: ICD-10-CM

## (undated) DEVICE — WOUND RETRACTOR AND PROTECTOR: Brand: ALEXIS O WOUND PROTECTOR-RETRACTOR

## (undated) DEVICE — 3M™ MEDITPORE™ SOFT CLOTH TAPE 6 IN X 10 YD 12 ROLLS/CASE 2966: Brand: 3M™ MEDIPORE™

## (undated) DEVICE — DRAPE SRG 70X60IN SPLT U IMPRV

## (undated) DEVICE — BLADE SHVR COOLCUT 13CM 4MM

## (undated) DEVICE — VIOLET BRAIDED (POLYGLACTIN 910), SYNTHETIC ABSORBABLE SUTURE: Brand: COATED VICRYL

## (undated) DEVICE — ARTHROSCOPY: Brand: MEDLINE INDUSTRIES, INC.

## (undated) DEVICE — MEDI-VAC NON-CONDUCTIVE SUCTION TUBING 6MM X 1.8M (6FT.) L: Brand: CARDINAL HEALTH

## (undated) DEVICE — LINE MNTR ADLT SET O2 INTMD

## (undated) DEVICE — STERILE LATEX POWDER-FREE SURGICAL GLOVESWITH NITRILE COATING: Brand: PROTEXIS

## (undated) DEVICE — SUTURE VICRYL 0 CT-1

## (undated) DEVICE — Device: Brand: CUSTOM PROCEDURE KIT

## (undated) DEVICE — UNDYED BRAIDED (POLYGLACTIN 910), SYNTHETIC ABSORBABLE SUTURE: Brand: COATED VICRYL

## (undated) DEVICE — SOL  .9 1000ML BTL

## (undated) DEVICE — 3 ML SYRINGE LUER-LOCK TIP: Brand: MONOJECT

## (undated) DEVICE — SUTURE ETHILON 3-0 669H

## (undated) DEVICE — FORCEP RADIAL JAW 4

## (undated) DEVICE — 3M™ STERI-DRAPE™ U-DRAPE 1015: Brand: STERI-DRAPE™

## (undated) DEVICE — 20 ML SYRINGE LUER-LOCK TIP: Brand: MONOJECT

## (undated) DEVICE — LAPAROTOMY: Brand: MEDLINE INDUSTRIES, INC.

## (undated) DEVICE — LIGASURE IMPACT OPEN DEVICE

## (undated) DEVICE — SUTURE VICRYL 2-0 J105T

## (undated) DEVICE — DRAPE SHEET TRANSVERSE LAP

## (undated) DEVICE — Device: Brand: DEFENDO AIR/WATER/SUCTION AND BIOPSY VALVE

## (undated) DEVICE — 3M™ STERI-STRIP™ REINFORCED ADHESIVE SKIN CLOSURES, R1547, 1/2 IN X 4 IN (12 MM X 100 MM), 6 STRIPS/ENVELOPE: Brand: 3M™ STERI-STRIP™

## (undated) DEVICE — CAPSULE ENDO PILL CAM SB3

## (undated) DEVICE — ZIMMER® STERILE DISPOSABLE TOURNIQUET CUFF WITH PLC, DUAL PORT, SINGLE BLADDER, 30 IN. (76 CM)

## (undated) DEVICE — SPONGE LAP 18X18 XRAY STRL

## (undated) DEVICE — 3M™ STERI-STRIP™ COMPOUND BENZOIN TINCTURE 40 BAGS/CARTON 4 CARTONS/CASE C1544: Brand: 3M™ STERI-STRIP™

## (undated) DEVICE — SUCTION CANISTER, 3000CC,SAFELINER: Brand: DEROYAL

## (undated) DEVICE — ABDOMINAL PAD: Brand: CURITY

## (undated) DEVICE — CHLORAPREP 26ML APPLICATOR

## (undated) DEVICE — SOL H2O 1000ML BTL

## (undated) DEVICE — GOWN SURG AERO BLUE PERF LG

## (undated) DEVICE — SUTURE VICRYL 0 J340H

## (undated) DEVICE — SOL  .9 3000ML

## (undated) DEVICE — STERILE POLYISOPRENE POWDER-FREE SURGICAL GLOVES: Brand: PROTEXIS

## (undated) NOTE — LETTER
10/1/2021          To Whom It May Concern:    Amarjit Client is currently under my medical care. She was diagnosed with Covid on 9/9/2021  She may return to work on 10/4/2021. Activity is restricted as follows: none.     If you require additional informat

## (undated) NOTE — LETTER
5/12/2022              [address] [address]        REILLY RODRIGUEZ [facility] [address]*         To Whom It May Concern,    Esperanza Lehman is currently under my medical care. She has a history of multiple pulmonary emboli. Please allow her to have a standing desk as, she needs to move throughout the day.     Sincerely,    MD Tess Fuentes [place] , [address] Karina Max Amaro [address] [facility] [place] [id number] [phone]        Document electronically generated by:  Ignacia Wilson

## (undated) NOTE — Clinical Note
Hi,   I ordered cln given history and worsening of constipation. I did not order egd as PJS not confirmed-genetic testing was negative.     Thanks,  Torrey Padilla

## (undated) NOTE — MR AVS SNAPSHOT
[facility]  [address], [address]  [phone]               Thank you for choosing us for your health care visit with Noe Prado MD.  We are glad to serve you and happy to provide you with this summary Instructions and Information about Your Health     None      Allergies as of May 03, 2017     No Known Allergies                Today's Vital Signs     BP Pulse Temp Weight          122/82 mmHg 72 98.5 °F (36.9 °C) (Oral) 167 lb (75.751 kg)           Max

## (undated) NOTE — LETTER
To Whom It May Concern:    Shari Knight is under my medical care. Damon Grajeda may return to work on 5-8-18.       Sincerely,    Edita Martinez MD  [address] [facility], [address] [address]

## (undated) NOTE — LETTER
[address], [place]  Authorization for Invasive Procedures  1.  I hereby authorize Dr. Jeovany Hannah , my physician and whomever may be designated as the doctor's assistant, to perform the following operation and/or procedure:  Magnet 4. Should the need arise during my operation or immediate post-operative period; I also consent to the administration of blood and/or blood products.  Further, I understand that despite careful testing and screening of blood and blood products, I may still 9. Patients having a sterilization procedure: I understand that if the procedure is successful the results will be permanent and it will therefore be impossible for me to inseminate, conceive or bear children.  I also understand that the procedure is intend

## (undated) NOTE — MR AVS SNAPSHOT
[facility]  [address], [address]  [phone]               Thank you for choosing us for your health care visit with Warden Eva MD.  We are glad to serve you and happy to provide you with this summary o [address] [facility] (MRI Only)  [address]    It is the patient's responsibility to check with and follow their insurance company's guidelines for prior authorization for this test.  You may be held responsible for payment in full if pr BP Pulse Temp Weight          124/76 mmHg 84 98.4 °F (36.9 °C) (Oral) 167 lb (75.751 kg)           Current Medications          This list is accurate as of: 3/15/17  1:56 PM.  Always use your most recent med list.                ferrous sulfate 325 (65 FE

## (undated) NOTE — LETTER
9/17/2021              Ross Coffey        [address] [facility] [address]         To Whom It May Concern,    My patient, Ross Coffey, tested positive for [address] on 9/9/2021.  She has been quarantining at home as instruc

## (undated) NOTE — MR AVS SNAPSHOT
Richard Mohr   3/27/2017 1:15 PM   Office Visit   MRN:  [id number]    Description:  Female : 1985   Provider:  Elizabeth Mcclure   Department:  [facility] Hematology Oncology              Visit Summary      Primary Visit Diagnosis     I Monday March 27, 2017     LAB:  Lena Guerrero MUTATION        Monday March 27, 2017     LAB:  IRON AND TIBC             Gabriele     Sign up for [employer], your secure online medical record.   [employer] will allow you to access patient instructions from your

## (undated) NOTE — LETTER
June 17, 2020    Peggy Persaud MD  [address] [facility]     Patient: Laura Parrish   YOB: 1985   Date of Visit: 6/17/2020       Dear Dr. Sina Covington MD:    Thank you for referring Mignonyareli Dora to me for evaluation.  H • Cancer Mother         liver cancer   • Diabetes Paternal Grandfather    • Hypertension Paternal Grandfather    • Glaucoma Neg    • Macular degeneration Neg        Social History: Social History    Tobacco Use      Smoking status: Never Smoker      Smokel Meds This Visit:  Requested Prescriptions      No prescriptions requested or ordered in this encounter        Follow up instructions:  Return if symptoms worsen or fail to improve.     6/17/2020  Scribed by: Filippo England MD      If you have questions, p

## (undated) NOTE — LETTER
6/4/2024        Patricia Gauthier        [address] [place] [address] [address]        [place] [address]           Dear Dr. Juanjose Gauthier is currently under my medical care. She is cleared to undergo surgery as as scheduled.         Please do not hesitate to contact me at the clinic should you have any questions or need further assistance.           Sincerely,        Keyshawn Sinha MD  [address] [id number] [phone]

## (undated) NOTE — LETTER
This letter (including any attachments) contains confidential information intended only for the addressee. Any use or disclosure by any other person is unlawful.   If you are not the intended recipient, please notify our office immediately at [phone]

## (undated) NOTE — LETTER
6/25/2018              Hussain Sutton        [address] [facility] [address]         To Whom It May Concern,    Hussain Sutton had a abdominal hysterectomy and bilateral salpingectomy on 1/4/18.   The standard postoperative recovery

## (undated) NOTE — LETTER
1/8/2020              Justin Sheldon        [address] [facility]             Dear Jenifer Husain,    This letter is to inform you that our office has made several attempts to reach you by phone without success.   We were attemp

## (undated) NOTE — LETTER
12/11/2023              [address]        [address] [facility] [address]         To Whom It May Concern:    Anastasia Simeon was seen and evaluated in our office on 12/11/2023. Due to her current symptoms, please allow her to work from home at this. Pending her clinical improvement, her anticipate return to full in-person duties is 12/18/2023. If you require any more information, please contact our office.         Sincerely,      Aurora Marrero MD  [facility], [address] [address] [phone]        Document electronically generated by:  Aurora Marrero MD

## (undated) NOTE — LETTER
9/24/2021          To Whom It May Concern:    Evon Mike is currently under my medical care and was seen today for a follow-up visit. At this time, she is not cleared to return to work.   Please excuse her absence from September 27, 2021 -October 1, 2

## (undated) NOTE — MR AVS SNAPSHOT
[facility]  [address], [address]  [phone]               Thank you for choosing us for your health care visit with Parag Antonio MD.  We are glad to serve you and happy to provide you with this summary o [employer] will allow you to access patient instructions from your recent visit,  view other health information, and more. To sign up or find more information, go to [email or web address] and click on the Sign Up Now link in the [employer] box.      Enter

## (undated) NOTE — MR AVS SNAPSHOT
[facility]  [place] [address]  [phone]               Thank you for choosing us for your health care visit with OMAYRA Goodson.   We are glad to serve you and happy to provide you with this summary of yo is more common in women since the rectum and urethra are closer to each other than in men. Wiping from front to back after using the toilet and keeping the area clean can help prevent germs from getting to the urethra.   · Blockage of urine flow through the Sign up for [employer], your secure online medical record. [employer] will allow you to access patient instructions from your recent visit,  view other health information, and more. To sign up or find more information, go to [email or web address]

## (undated) NOTE — LETTER
2/21/2018              Samantha Pugh        [address] [facility] [address]         To Whom It May Concern,    Joshua Jarredzaira was seen in my office today for post operative care.  She has been cleared to return to work with no restric

## (undated) NOTE — MR AVS SNAPSHOT
After Visit Summary   3/3/2017    Anali Tolbert    MRN: [id number]           Visit Information        Provider Department Dept Phone    3/3/2017  3:00 PM Stefania Hooker MD [facility] [phone]      Your Vitals Were     BP Pulse Ht Wt BMI L box under the *New User? section. Enter your [employer] Activation Code exactly as it appears below. You will not need to use this code after you have completed the sign-up process.  If you do not sign up before the expiration date, you must request a new

## (undated) NOTE — MR AVS SNAPSHOT
Grant Miller   2017 10:30 AM   Office Visit   MRN:  [id number]    Description:  Female : 1985   Department:  [address] [place] [address] - [facility]              Visit Summary      Primary Visit Diagnosis     Iron deficiency anemia d

## (undated) NOTE — Clinical Note
AUTHORIZATION FOR SURGICAL OPERATION OR OTHER PROCEDURE    1.  I hereby [facility] Sofia Evans, [facility] assigned to my case to perform the following operation and/or procedure at the [facility]:    ______________________ Patient Name:  ______________________________________________________  (please print)      Patient signature:  ___________________________________________________             Relationship to Patient:             Parent    Responsible person

## (undated) NOTE — LETTER
6/22/2022              [address] [place] [address] [address] [address]*         To Whom it may concern:     This is to certify that Marco East had an appointment on 6/22/2022 at 1:45 PM with Ghulam Turner MD.        Sincerely,    Ghulam Turner MD  [place] [facility] [place] [address]  [phone]        Document electronically generated by:  Hemant Cummins MA

## (undated) NOTE — LETTER
11/14/2017              Joshua Actis        [address] [facility] [address]         To Whom It May Concern,    I do not recommend that Patricia receive additional doses of Td vaccine at this time.   She is attempting to obtain her immuniza

## (undated) NOTE — MR AVS SNAPSHOT
Alyssa Ruiz   5/15/2017 11:45 AM   Office Visit   MRN:  [id number]    Description:  Female : 1985   Provider:  Marilynn Walter   Department:  [facility]              Visit Summary      Primary Visit Diagnosis [address] [facility] [address]       Tuesday May 16, 2017 2:35 PM     Appointment with [address] [place] [address] at [facility] ([phone])   [address] [address]       Wednesday May 17, 2017 3:05 PM     Appointment with [address] [place] [address]

## (undated) NOTE — LETTER
10/1/2019          To Whom It May Concern:    Orlin Sullivan is currently under my medical care and was seen in clinic today for a follow up visit. She is cleared to return to work on 10/7/19 with no restrictions.    If you require additional information p

## (undated) NOTE — LETTER
Patient Name: Thalia Orozco  YOB: 1985          MRN :  [id number]  Date:  3/2/2021  Referring Physician:  Italia Osei  Pt has attended 7 visits in Physical Therapy.      Dx:  Left knee pain, unspecified chronicity Pt will demonstrate decreased pain to <3/10 during functional tasks and ADL's.  - partially met    Pt will display improved gross strength of L LE to 4/5. - partially met   Pt will be able to perform squatting tasks with even weight-bearing through B LE's,